# Patient Record
Sex: FEMALE | Race: WHITE | NOT HISPANIC OR LATINO | Employment: UNEMPLOYED | URBAN - METROPOLITAN AREA
[De-identification: names, ages, dates, MRNs, and addresses within clinical notes are randomized per-mention and may not be internally consistent; named-entity substitution may affect disease eponyms.]

---

## 2017-07-31 ENCOUNTER — NON-PROVIDER VISIT (OUTPATIENT)
Dept: OCCUPATIONAL MEDICINE | Facility: CLINIC | Age: 47
End: 2017-07-31

## 2017-07-31 DIAGNOSIS — Z02.1 DRUG TESTING, PRE-EMPLOYMENT: ICD-10-CM

## 2017-07-31 DIAGNOSIS — Z02.1 PRE-EMPLOYMENT DRUG SCREENING: ICD-10-CM

## 2017-07-31 LAB
AMP AMPHETAMINE: NORMAL
COC COCAINE: NORMAL
INT CON NEG: NORMAL
INT CON POS: NORMAL
MET METHAMPHETAMINES: NORMAL
OPI OPIATES: NORMAL
PCP PHENCYCLIDINE: NORMAL
POC DRUG COMMENT 753798-OCCUPATIONAL HEALTH: NORMAL
THC: NORMAL

## 2017-07-31 PROCEDURE — 80305 DRUG TEST PRSMV DIR OPT OBS: CPT | Performed by: PREVENTIVE MEDICINE

## 2017-08-29 ENCOUNTER — HOSPITAL ENCOUNTER (EMERGENCY)
Facility: MEDICAL CENTER | Age: 47
End: 2017-08-30
Attending: EMERGENCY MEDICINE
Payer: MEDICAID

## 2017-08-29 DIAGNOSIS — T50.904A OVERDOSE, UNDETERMINED INTENT, INITIAL ENCOUNTER: ICD-10-CM

## 2017-08-29 DIAGNOSIS — F31.9 BIPOLAR 1 DISORDER (HCC): ICD-10-CM

## 2017-08-29 DIAGNOSIS — R44.3 HALLUCINATIONS: ICD-10-CM

## 2017-08-29 LAB
ALBUMIN SERPL BCP-MCNC: 3.8 G/DL (ref 3.2–4.9)
ALBUMIN/GLOB SERPL: 1.3 G/DL
ALP SERPL-CCNC: 51 U/L (ref 30–99)
ALT SERPL-CCNC: 17 U/L (ref 2–50)
AMPHET UR QL SCN: NEGATIVE
ANION GAP SERPL CALC-SCNC: 10 MMOL/L (ref 0–11.9)
APAP SERPL-MCNC: <10 UG/ML (ref 10–30)
APPEARANCE UR: ABNORMAL
AST SERPL-CCNC: 17 U/L (ref 12–45)
BACTERIA #/AREA URNS HPF: ABNORMAL /HPF
BARBITURATES UR QL SCN: NEGATIVE
BASOPHILS # BLD AUTO: 0.4 % (ref 0–1.8)
BASOPHILS # BLD: 0.04 K/UL (ref 0–0.12)
BENZODIAZ UR QL SCN: NEGATIVE
BILIRUB SERPL-MCNC: 0.5 MG/DL (ref 0.1–1.5)
BILIRUB UR QL STRIP.AUTO: ABNORMAL
BUN SERPL-MCNC: 14 MG/DL (ref 8–22)
BZE UR QL SCN: NEGATIVE
CALCIUM SERPL-MCNC: 9.1 MG/DL (ref 8.5–10.5)
CANNABINOIDS UR QL SCN: POSITIVE
CHLORIDE SERPL-SCNC: 108 MMOL/L (ref 96–112)
CO2 SERPL-SCNC: 22 MMOL/L (ref 20–33)
COLOR UR: YELLOW
CREAT SERPL-MCNC: 0.84 MG/DL (ref 0.5–1.4)
CULTURE IF INDICATED INDCX: YES UA CULTURE
EOSINOPHIL # BLD AUTO: 0.23 K/UL (ref 0–0.51)
EOSINOPHIL NFR BLD: 2.5 % (ref 0–6.9)
EPI CELLS #/AREA URNS HPF: ABNORMAL /HPF
ERYTHROCYTE [DISTWIDTH] IN BLOOD BY AUTOMATED COUNT: 44.9 FL (ref 35.9–50)
ETHANOL BLD-MCNC: 0.01 G/DL
GFR SERPL CREATININE-BSD FRML MDRD: >60 ML/MIN/1.73 M 2
GLOBULIN SER CALC-MCNC: 2.9 G/DL (ref 1.9–3.5)
GLUCOSE SERPL-MCNC: 180 MG/DL (ref 65–99)
GLUCOSE UR STRIP.AUTO-MCNC: NEGATIVE MG/DL
GRAN CASTS #/AREA URNS LPF: ABNORMAL /LPF
HCT VFR BLD AUTO: 39.8 % (ref 37–47)
HGB BLD-MCNC: 13.8 G/DL (ref 12–16)
HYALINE CASTS #/AREA URNS LPF: ABNORMAL /LPF
IMM GRANULOCYTES # BLD AUTO: 0.04 K/UL (ref 0–0.11)
IMM GRANULOCYTES NFR BLD AUTO: 0.4 % (ref 0–0.9)
KETONES UR STRIP.AUTO-MCNC: ABNORMAL MG/DL
LEUKOCYTE ESTERASE UR QL STRIP.AUTO: ABNORMAL
LYMPHOCYTES # BLD AUTO: 1.56 K/UL (ref 1–4.8)
LYMPHOCYTES NFR BLD: 17 % (ref 22–41)
MCH RBC QN AUTO: 32 PG (ref 27–33)
MCHC RBC AUTO-ENTMCNC: 34.7 G/DL (ref 33.6–35)
MCV RBC AUTO: 92.3 FL (ref 81.4–97.8)
METHADONE UR QL SCN: POSITIVE
MICRO URNS: ABNORMAL
MONOCYTES # BLD AUTO: 0.54 K/UL (ref 0–0.85)
MONOCYTES NFR BLD AUTO: 5.9 % (ref 0–13.4)
NEUTROPHILS # BLD AUTO: 6.77 K/UL (ref 2–7.15)
NEUTROPHILS NFR BLD: 73.8 % (ref 44–72)
NITRITE UR QL STRIP.AUTO: NEGATIVE
NRBC # BLD AUTO: 0 K/UL
NRBC BLD AUTO-RTO: 0 /100 WBC
OPIATES UR QL SCN: NEGATIVE
OXYCODONE UR QL SCN: NEGATIVE
PCP UR QL SCN: NEGATIVE
PH UR STRIP.AUTO: 6 [PH]
PLATELET # BLD AUTO: 221 K/UL (ref 164–446)
PMV BLD AUTO: 10 FL (ref 9–12.9)
POTASSIUM SERPL-SCNC: 3.3 MMOL/L (ref 3.6–5.5)
PROPOXYPH UR QL SCN: NEGATIVE
PROT SERPL-MCNC: 6.7 G/DL (ref 6–8.2)
PROT UR QL STRIP: 30 MG/DL
RBC # BLD AUTO: 4.31 M/UL (ref 4.2–5.4)
RBC UR QL AUTO: NEGATIVE
SALICYLATES SERPL-MCNC: 0 MG/DL (ref 15–25)
SODIUM SERPL-SCNC: 140 MMOL/L (ref 135–145)
SP GR UR STRIP.AUTO: 1.03
TSH SERPL DL<=0.005 MIU/L-ACNC: 3.61 UIU/ML (ref 0.3–3.7)
UROBILINOGEN UR STRIP.AUTO-MCNC: 2 MG/DL
WBC # BLD AUTO: 9.2 K/UL (ref 4.8–10.8)
WBC #/AREA URNS HPF: ABNORMAL /HPF

## 2017-08-29 PROCEDURE — 85025 COMPLETE CBC W/AUTO DIFF WBC: CPT

## 2017-08-29 PROCEDURE — 80053 COMPREHEN METABOLIC PANEL: CPT

## 2017-08-29 PROCEDURE — 81001 URINALYSIS AUTO W/SCOPE: CPT | Mod: 59

## 2017-08-29 PROCEDURE — 87086 URINE CULTURE/COLONY COUNT: CPT

## 2017-08-29 PROCEDURE — 36415 COLL VENOUS BLD VENIPUNCTURE: CPT

## 2017-08-29 PROCEDURE — 700105 HCHG RX REV CODE 258: Performed by: EMERGENCY MEDICINE

## 2017-08-29 PROCEDURE — 84443 ASSAY THYROID STIM HORMONE: CPT

## 2017-08-29 PROCEDURE — 99285 EMERGENCY DEPT VISIT HI MDM: CPT

## 2017-08-29 PROCEDURE — 96360 HYDRATION IV INFUSION INIT: CPT

## 2017-08-29 PROCEDURE — 80307 DRUG TEST PRSMV CHEM ANLYZR: CPT

## 2017-08-29 PROCEDURE — 93005 ELECTROCARDIOGRAM TRACING: CPT | Performed by: EMERGENCY MEDICINE

## 2017-08-29 RX ORDER — HYDROXYZINE HYDROCHLORIDE 25 MG/1
25 TABLET, FILM COATED ORAL 3 TIMES DAILY PRN
COMMUNITY
End: 2018-06-26

## 2017-08-29 RX ORDER — SODIUM CHLORIDE, SODIUM LACTATE, POTASSIUM CHLORIDE, CALCIUM CHLORIDE 600; 310; 30; 20 MG/100ML; MG/100ML; MG/100ML; MG/100ML
1000 INJECTION, SOLUTION INTRAVENOUS ONCE
Status: COMPLETED | OUTPATIENT
Start: 2017-08-29 | End: 2017-08-29

## 2017-08-29 RX ORDER — GABAPENTIN 300 MG/1
300 CAPSULE ORAL 3 TIMES DAILY
COMMUNITY
End: 2018-06-26

## 2017-08-29 RX ORDER — RISPERIDONE 1 MG/1
1 TABLET ORAL 2 TIMES DAILY
COMMUNITY
End: 2018-06-26

## 2017-08-29 RX ADMIN — SODIUM CHLORIDE, POTASSIUM CHLORIDE, SODIUM LACTATE AND CALCIUM CHLORIDE 1000 ML: 600; 310; 30; 20 INJECTION, SOLUTION INTRAVENOUS at 20:28

## 2017-08-29 ASSESSMENT — PAIN SCALES - GENERAL: PAINLEVEL_OUTOF10: 0

## 2017-08-30 VITALS
HEART RATE: 89 BPM | TEMPERATURE: 99.2 F | WEIGHT: 185 LBS | OXYGEN SATURATION: 92 % | BODY MASS INDEX: 30.82 KG/M2 | HEIGHT: 65 IN | RESPIRATION RATE: 16 BRPM

## 2017-08-30 NOTE — DISCHARGE PLANNING
Referred patient to [facility] for follow up services.    Suzanne Merritt, Ph.D.

## 2017-08-30 NOTE — DISCHARGE INSTRUCTIONS
Bipolar Disorder  Bipolar disorder is a mental illness. The term bipolar disorder actually is used to describe a group of disorders that all share varying degrees of emotional highs and lows that can interfere with daily functioning, such as work, school, or relationships. Bipolar disorder also can lead to drug abuse, hospitalization, and suicide.  The emotional highs of bipolar disorder are periods of elation or irritability and high energy. These highs can range from a mild form (hypomania) to a severe form (gwen). People experiencing episodes of hypomania may appear energetic, excitable, and highly productive. People experiencing gwen may behave impulsively or erratically. They often make poor decisions. They may have difficulty sleeping. The most severe episodes of gwen can involve having very distorted beliefs or perceptions about the world and seeing or hearing things that are not real (psychotic delusions and hallucinations).   The emotional lows of bipolar disorder (depression) also can range from mild to severe. Severe episodes of bipolar depression can involve psychotic delusions and hallucinations.  Sometimes people with bipolar disorder experience a state of mixed mood. Symptoms of hypomania or gwen and depression are both present during this mixed-mood episode.  SIGNS AND SYMPTOMS  There are signs and symptoms of the episodes of hypomania and gwen as well as the episodes of depression. The signs and symptoms of hypomania and gwen are similar but vary in severity. They include:  · Inflated self-esteem or feeling of increased self-confidence.  · Decreased need for sleep.  · Unusual talkativeness (rapid or pressured speech) or the feeling of a need to keep talking.  · Sensation of racing thoughts or constant talking, with quick shifts between topics that may or may not be related (flight of ideas).  · Decreased ability to focus or concentrate.  · Increased purposeful activity, such as work, studies,  or social activity, or nonproductive activity, such as pacing, squirming and fidgeting, or finger and toe tapping.  · Impulsive behavior and use of poor judgment, resulting in high-risk activities, such as having unprotected sex or spending excessive amounts of money.  Signs and symptoms of depression include the following:   · Feelings of sadness, hopelessness, or helplessness.  · Frequent or uncontrollable episodes of crying.  · Lack of feeling anything or caring about anything.  · Difficulty sleeping or sleeping too much.   · Inability to enjoy the things you used to enjoy.    · Desire to be alone all the time.    · Feelings of guilt or worthlessness.   · Lack of energy or motivation.    · Difficulty concentrating, remembering, or making decisions.   · Change in appetite or weight beyond normal fluctuations.  · Thoughts of death or the desire to harm yourself.  DIAGNOSIS   Bipolar disorder is diagnosed through an assessment by your caregiver. Your caregiver will ask questions about your emotional episodes. There are two main types of bipolar disorder. People with type I bipolar disorder have manic episodes with or without depressive episodes. People with type II bipolar disorder have hypomanic episodes and major depressive episodes, which are more serious than mild depression. The type of bipolar disorder you have can make an important difference in how your illness is monitored and treated.  Your caregiver may ask questions about your medical history and use of alcohol or drugs, including prescription medication. Certain medical conditions and substances also can cause emotional highs and lows that resemble bipolar disorder (secondary bipolar disorder).   TREATMENT   Bipolar disorder is a long-term illness. It is best controlled with continuous treatment rather than treatment only when symptoms occur. The following treatments can be prescribed for bipolar disorders:  · Medication--Medication can be prescribed by  a doctor that is an expert in treating mental disorders (psychiatrists). Medications called mood stabilizers are usually prescribed to help control the illness. Other medications are sometimes added if symptoms of gwen, depression, or psychotic delusions and hallucinations occur despite the use of a mood stabilizer.  · Talk therapy--Some forms of talk therapy are helpful in providing support, education, and guidance.  A combination of medication and talk therapy is best for managing the disorder over time. A procedure in which electricity is applied to your brain through your scalp (electroconvulsive therapy) is used in cases of severe gwen when medication and talk therapy do not work or work too slowly.     This information is not intended to replace advice given to you by your health care provider. Make sure you discuss any questions you have with your health care provider.     Document Released: 03/26/2002 Document Revised: 01/08/2016 Document Reviewed: 01/13/2014  [employer] Interactive Patient Education ©2016 [employer]    Hallucinations and Delusions  You seem to be having hallucinations and/or delusions. You may be hearing voices that no one else can hear. This can seem very real to you. You may be having thoughts and fears that do not make sense to others. This condition can be due to mental disease like schizophrenia. It may be caused by a medical condition, such as an infection or electrolyte disturbance. These symptoms are also seen in drug abusers, especially those who use crack cocaine and amphetamines. Drugs like PCP, LSD, MDMA, peyote, and psilocybin can also cause frightening hallucinations and loss of control.  If your symptoms are due to drug abuse, your mental state should improve as the drug(s) leave your system. Someone you trust should be with you until you are better to protect you and calm your fears. Often tranquilizers are very helpful at controlling hallucinations, anxiety, and  destructive behavior. Getting a proper diet and enough sleep is important to recovery. If your symptoms are not due to drugs, or do not improve over several days after stopping drug use, you need further medical or mental health care.  SEEK IMMEDIATE MEDICAL CARE IF:   · Your symptoms get worse, especially if you think your life is in danger  · You have violent or destructive thoughts.  Recovery is possible, but you have to get proper treatment and avoid drugs that are known to cause you trouble.  Document Released: 01/25/2006 Document Revised: 03/11/2013 Document Reviewed: 12/18/2006  ExitCare® Patient Information ©2014 [employer].    Drug Overdose  Drug overdose happens when you take too much of a drug. An overdose can occur with illegal drugs, prescription drugs, or over-the-counter (OTC) drugs.  The effects of drug overdose can be mild, dangerous, or even deadly.  CAUSES  Drug overdose may be caused by:  · Taking too much of a drug on purpose.  · Taking too much of a drug by accident.  · An error made by a health care provider who prescribes a drug.  · An error made by a pharmacist who fills the prescription order.  Drugs that commonly cause overdose include:  · Mental health drugs.  · Pain medicines.  · Illegal drugs.  · OTC cough and cold medicines.  · Heart medicines.  · Seizure medicines.  RISK FACTORS  Drug overdose is more likely in:  · Children. They may be attracted to colorful pills. Because of children's small size, even a small amount of a drug can be dangerous.  · Elderly people. They may be taking many different drugs. Elderly people may have difficulty reading labels or remembering when they last took their medicine.  The risk of drug overdose is also higher for someone who:  · Takes illegal drugs.  · Takes a drug and drinks alcohol.  · Has a mental health condition.  SYMPTOMS  Signs and symptoms of drug overdose depend on the drug and the amount that was taken. Common danger signs  include:  · Behavior changes.  · Sleepiness.  · Slowed breathing.  · Nausea and vomiting.  · Seizures.  · Changes in eye pupil size (very large or very small).  If there are signs of very low blood pressure from a drug overdose (shock), emergency treatment is required. These signs include:  · Cold and clammy skin.  · Pale skin.  · Blue lips.  · Very slow breathing.  · Extreme sleepiness.  · Loss of consciousness.  DIAGNOSIS  Drug overdose may be diagnosed based on your symptoms. It is important that you tell your health care provider:  · All of the drugs that you have taken.  · When you took the drugs.  · Whether you were drinking alcohol.  Your health care provider will do a physical exam. This exam may include:  · Checking and monitoring your heart rate and rhythm, your temperature, and your blood pressure (vital signs).  · Checking your breathing and oxygen level.  You may also have tests, including:   · Urine tests to check for drugs in your system.  · Blood tests to check for:  ¨ Drugs in your system.  ¨ Signs of an imbalance of your blood minerals (electrolytes).  ¨ Liver damage.  ¨ Kidney damage.  TREATMENT  Supporting your vital signs and your breathing is the first step in treating a drug overdose. Treatment may also include:  · Receiving fluids and electrolytes through an IV tube.  · Having a breathing tube (endotracheal tube) inserted in your airway to help you breathe.  · Having a tube passed through your nose and into your stomach (nasogastric tube) to wash out your stomach.  · Medicines. You may get medicines to:  ¨ Make you vomit.  ¨ Absorb any medicine that is left in your digestive system (activated charcoal).  ¨ Block or reverse the effect of the drug that caused the overdose.  · Having your blood filtered through an artificial kidney machine (hemodialysis). You may need this if your overdose is severe or if you have kidney failure.  · Having ongoing counseling and mental health support if you  intentionally overdosed or used an illegal drug.  HOME CARE INSTRUCTIONS  · Take medicines only as directed by your health care provider. Always ask your health care provider to discuss the possible side effects of any new drug that you start taking.  · Keep a list of all of the drugs that you take, including over-the-counter medicines. Bring this list with you to all of your medical visits.  · Read the drug inserts that come with your medicines.  · Do not use illegal drugs.  · Do not drink alcohol when taking drugs.  · Store all medicines in safety containers that are out of the reach of children.  · Keep the phone number of your local poison control center near your phone or on your cell phone.  · Get help if you are struggling with alcohol or drug use.  · Get help if you are struggling with depression or another mental health problem.  · Keep all follow-up visits as directed by your health care provider. This is important.  SEEK MEDICAL CARE IF:  · Your symptoms return.  · You develop any new signs or symptoms when you are taking medicines.  SEEK IMMEDIATE MEDICAL CARE IF:  · You think that you or someone else may have taken too much of a drug. The hotline of the National Poison Control Center is (059) 331-1923.  · You or someone else is having symptoms of a drug overdose.  · You have serious thoughts about hurting yourself or others.  · You have chest pain.  · You have difficulty breathing.  · You have a loss of consciousness.  Drug overdose is an emergency. Do not wait to see if the symptoms will go away. Get medical help right away. Call your local emergency services (911 in the [place] Do not drive yourself to the hospital.     This information is not intended to replace advice given to you by your health care provider. Make sure you discuss any questions you have with your health care provider.     Document Released: 05/03/2016 Document Reviewed: 12/23/2015  Elsevier Interactive Patient Education ©2016  [employer]

## 2017-08-30 NOTE — ED NOTES
Kaylee Ivy  47 y.o.  Chief Complaint   Patient presents with   • Hallucinations     auditory/visual     BIB EMS for above complaint. Patient currently A & O x 4, but with inappropriate conversation at time. States that she is seeing shapes in the corners of the room and that when she closes her eyes she hears people talking to her that aren't there. States that she has taken extra doses of both her hydroxyzine and metformin today. Hx. DM and bipolar.

## 2017-08-30 NOTE — ED NOTES
Patient discharged in stable condition with mother. Verbalized understanding of all discharge instructions. All belongings accounted for.

## 2017-08-30 NOTE — DISCHARGE PLANNING
Medical Social Work    Referral: Family Contact    Intervention: MSW received report from bedside RN that per ERP pt is in need of someone to pick her up or a safe way home.  Pt provided RN with her mom's number: Nora ([phone]).  MSW contacted pt's mom and informed her of pt's D/C.  Pt's mom states that she will come pick pt up.  MSW updated bedside RN and ER Lobby.    Plan: Pt to D/C home once family arrives.

## 2017-08-30 NOTE — ED PROVIDER NOTES
ED Provider Note    Scribed for Fred Jason D.O. by Isaebl Gay. 8/29/2017  7:52 PM    Primary care provider: Pcp Pt States None   History obtained from: Patient, nurse   History limited by: None     CHIEF COMPLAINT  Chief Complaint   Patient presents with   • Hallucinations     auditory/visual        HPI    Kaylee Ivy is a 47 y.o. female with a history of bipolar disorder who presents to the ED for visual and auditory hallucinations. Per report, the patient is seeing shapes in the corner of the room and hearing people talking who are not there. The patient reports that she took extra Risperdal today, 5 pills. Per patient, she was not trying to hurt herself, she just wanted to experience how they made her feel. She denies taking any other extra medications. The patient denies previous overdose in the past. Per patient, she began having difficulty speaking around 6:00 pm today. She states that she feels warm. Patient denies chills, nausea, vomiting, diarrhea, abdominal pain, dysuria, suicidal ideations, homicidal ideations. The patient correctly identifies the month, year and president. She originally thought she was at [place], but did know she was in the hospital.     REVIEW OF SYSTEMS  Please see HPI for pertinent positives/negatives.  All other systems reviewed and are negative. C    PAST MEDICAL HISTORY  Past Medical History:   Diagnosis Date   • Bipolar 1 disorder (CMS-[facility])    • Diabetes (CMS-[facility])    • History of diabetic neuropathy         SURGICAL HISTORY  No past surgical history.     SOCIAL HISTORY  Social History     Social History Main Topics   • Smoking status: Never Smoker   • Smokeless tobacco: Never Used   • Alcohol use No   • Drug use: No        FAMILY HISTORY  History reviewed. No pertinent family history.     CURRENT MEDICATIONS  Home Medications     Reviewed by Carey Thibodeaux R.N. (Registered Nurse) on 08/29/17 at 1942  Med List Status: Complete   Medication Last Dose Status  "  gabapentin (NEURONTIN) 300 MG Cap  Active   hydrOXYzine (ATARAX) 25 MG Tab  Active   insulin aspart (NOVOLOG) 100 UNIT/ML Solution  Active   metformin (GLUCOPHAGE) 500 MG Tab  Active   risperidone (RISPERDAL) 1 MG Tab  Active                 ALLERGIES  No Known Allergies     PHYSICAL EXAM  VITAL SIGNS: Pulse (!) 135   Temp 37.3 °C (99.2 °F)   Resp 20   Ht 1.651 m (5' 5\")   Wt 83.9 kg (185 lb)   SpO2 92%   BMI 30.79 kg/m²  @CLIFFORD[[id number]::@     Pulse ox interpretation: 92% I interpret this pulse ox as normal     Constitutional: Well developed, well nourished, alert in no apparent distress, nontoxic appearance    HENT: No external signs of trauma, normocephalic, bilateral external ears normal, oropharynx moist and clear, nose normal    Eyes: PERRL, EOMI, conjunctiva without erythema, no discharge, no icterus    Neck: Soft and supple, trachea midline, no stridor, no tenderness, no LAD, no JVD, good ROM    Cardiovascular: Tachycardia, no murmurs/rubs/gallops, strong distal pulses and good perfusion Thorax & Lungs: No respiratory distress, CTAB, no chest tenderness    Abdomen: Soft, nontender, nondistended, no guarding, no rebound, normal BS    Back: No CVAT    Extremities: No clubbing, no cyanosis, no edema, no gross deformity, good ROM, no tenderness, intact distal pulses with brisk cap refill    Skin: Warm, dry, no pallor/cyanosis, no rash noted    Lymphatic: No lymphadenopathy noted    Neuro: A/O times 3, no focal deficits noted   Neuro: Alert and oriented to person, place, and time.  GCS 15.  CN II-XII grossly intact.  Normal speech.  No pronator drift.  Equal strength bilateral UE/LE.  Sensation intact to touch.  No cerebellar signs.  Psychiatric: Cooperative, denies suicidal or homicidal ideation, impaired judgment        DIAGNOSTIC STUDIES / PROCEDURES    EKG  12 Lead EKG obtained at 2030 and interpreted by me:   Rate: 121   Rhythm: Sinus tach  Ectopy: None  Intervals: Normal   Axis: Normal   Q Waves: " Inferior leads  QRS: Normal   ST segments: Normal   T Waves: Normal     Clinical Impression:Sinus tach without acute ST-T wave changes      LABS  All labs reviewed by me.     Results for orders placed or performed during the hospital encounter of 08/29/17   CBC WITH DIFFERENTIAL   Result Value Ref Range    WBC 9.2 4.8 - 10.8 K/uL    RBC 4.31 4.20 - 5.40 M/uL    Hemoglobin 13.8 12.0 - 16.0 g/dL    Hematocrit 39.8 37.0 - 47.0 %    MCV 92.3 81.4 - 97.8 fL    MCH 32.0 27.0 - 33.0 pg    MCHC 34.7 33.6 - 35.0 g/dL    RDW 44.9 35.9 - 50.0 fL    Platelet Count 221 164 - 446 K/uL    MPV 10.0 9.0 - 12.9 fL    Neutrophils-Polys 73.80 (H) 44.00 - 72.00 %    Lymphocytes 17.00 (L) 22.00 - 41.00 %    Monocytes 5.90 0.00 - 13.40 %    Eosinophils 2.50 0.00 - 6.90 %    Basophils 0.40 0.00 - 1.80 %    Immature Granulocytes 0.40 0.00 - 0.90 %    Nucleated RBC 0.00 /100 WBC    Neutrophils (Absolute) 6.77 2.00 - 7.15 K/uL    Lymphs (Absolute) 1.56 1.00 - 4.80 K/uL    Monos (Absolute) 0.54 0.00 - 0.85 K/uL    Eos (Absolute) 0.23 0.00 - 0.51 K/uL    Baso (Absolute) 0.04 0.00 - 0.12 K/uL    Immature Granulocytes (abs) 0.04 0.00 - 0.11 K/uL    NRBC (Absolute) 0.00 K/uL   COMP METABOLIC PANEL   Result Value Ref Range    Sodium 140 135 - 145 mmol/L    Potassium 3.3 (L) 3.6 - 5.5 mmol/L    Chloride 108 96 - 112 mmol/L    Co2 22 20 - 33 mmol/L    Anion Gap 10.0 0.0 - 11.9    Glucose 180 (H) 65 - 99 mg/dL    Bun 14 8 - 22 mg/dL    Creatinine 0.84 0.50 - 1.40 mg/dL    Calcium 9.1 8.5 - 10.5 mg/dL    AST(SGOT) 17 12 - 45 U/L    ALT(SGPT) 17 2 - 50 U/L    Alkaline Phosphatase 51 30 - 99 U/L    Total Bilirubin 0.5 0.1 - 1.5 mg/dL    Albumin 3.8 3.2 - 4.9 g/dL    Total Protein 6.7 6.0 - 8.2 g/dL    Globulin 2.9 1.9 - 3.5 g/dL    A-G Ratio 1.3 g/dL   DIAGNOSTIC ALCOHOL   Result Value Ref Range    Diagnostic Alcohol 0.01 (H) 0.00 g/dL   URINE DRUG SCREEN   Result Value Ref Range    Amphetamines Urine Negative Negative    Barbiturates Negative Negative     Benzodiazepines Negative Negative    Cocaine Metabolite Negative Negative    Methadone Positive (A) Negative    Opiates Negative Negative    Oxycodone Negative Negative    Phencyclidine -Pcp Negative Negative    Propoxyphene Negative Negative    Cannabinoid Metab Positive (A) Negative   URINALYSIS CULTURE, IF INDICATED   Result Value Ref Range    Micro Urine Req Microscopic     Color Yellow     Character Hazy (A)     Specific Gravity 1.030 <1.035    Ph 6.0 5.0 - 8.0    Glucose Negative Negative mg/dL    Ketones Trace (A) Negative mg/dL    Protein 30 (A) Negative mg/dL    Bilirubin Small (A) Negative    Urobilinogen, Urine 2.0 Negative    Nitrite Negative Negative    Leukocyte Esterase Trace (A) Negative    Occult Blood Negative Negative    Culture Indicated Yes UA Culture   ACETAMINOPHEN   Result Value Ref Range    Acetaminophen -Tylenol <10 10 - 30 ug/mL   SALICYLATE   Result Value Ref Range    Salicylates, Quant. 0 (L) 15 - 25 mg/dL   TSH   Result Value Ref Range    TSH 3.610 0.300 - 3.700 uIU/mL   ESTIMATED GFR   Result Value Ref Range    GFR If African American >60 >60 mL/min/1.73 m 2    GFR If Non African American >60 >60 mL/min/1.73 m 2   URINE MICROSCOPIC (W/UA)   Result Value Ref Range    WBC 10-20 (A) /hpf    Bacteria Many (A) None /hpf    Epithelial Cells Moderate (A) /hpf    Hyaline Cast 3-5 (A) /lpf    Granular Casts 0-2 /lpf   EKG (NOW)   Result Value Ref Range    Report       [facility] Emergency Dept.    Test Date:  2017  Pt Name:    OKSANA WILSON                  Department: ER  MRN:        [id number]                      Room:        01  Gender:     F                            Technician: [id number]  :        [id number]                   Requested By:JESSI DUPREE  Order #:    [id number]                    Reading MD:    Measurements  Intervals                                Axis  Rate:       121                          P:          70  NC:         168                           QRS:        43  QRSD:       86                           T:          -51  QT:         268  QTc:        381    Interpretive Statements  SINUS TACHYCARDIA  PROBABLE INFERIOR INFARCT, AGE INDETERMINATE  No previous ECG available for comparison        COURSE & MEDICAL DECISION MAKING  Nursing notes, VS, PMSFHx reviewed in chart.     Differential diagnoses considered include but are not limited to: Suicidal ideation/attempt, anxiety, depression, psychosis/schizophrenia, personality disorder, intoxication/overdose, CVA/TIA, meningitis/encephalitis, sepsis, hypoxia, electrolyte abnormality, thyroid disorder, endocrine dysfunction       7:54 PM Patient seen and evaluated at bedside. Labs and EKG were ordered. Patient was treated with lactated ringers infusion.     12:53 AM Patient reevaluated at bedside. Her mother is here to take her home. Discussed plan for discharge; I advised the patient to return to the [facility] ED with any new or worsening symptoms. Patient was given the opportunity for questions. I addressed all questions or concerns at this time and they verbalize agreement to the plan of care.     Patient presents to the ED with above complaint. EKG shows sinus tach with normal intervals and without any ischemic changes. Labs were essentially unremarkable except for mild hyperglycemia and positive urine drug screen. Her UA was not a good specimen limiting its utility. Patient was monitored in the ED for more than 5 hours and has remained stable. She was also evaluated by life skills who does not feel patient needs inpatient treatment. Patient continues to deny any suicidal or homicidal ideation. Patient will be discharged home with her mother. Findings discussed with the patient and her mother. I discussed with them importance of outpatient follow-up and gave them return to ED precautions. They verbalized understanding and agreed with plan of care with no further questions or concerns.    The patient is referred to a  primary physician for blood pressure management, diabetic screening, and for all other preventative health concerns.       FINAL IMPRESSION  1. Hallucinations    2. Bipolar 1 disorder (CMS-H[facility])    3. Overdose, undetermined intent, initial encounter         DISPOSITION  Patient will be discharged home in stable condition.       FOLLOW UP  Please follow up with your doctor    Call today      [facility], Emergency Dept  [address] [facility] [id number] [phone]    If symptoms worsen         Isabel BENTLEY (Scribe), am scribing for, and in the presence of, Fred Jason D.O..    Electronically signed by: Isabel Gay (Scribe), 8/29/2017    IFred D.O. personally performed the services described in this documentation, as scribed by Isabel Gay in my presence, and it is both accurate and complete.    Portions of this record were made with voice recognition software and by scribes.  Despite my review, spelling/grammar/context errors may still remain.  Interpretation of this chart should be taken in this context.    The note accurately reflects work and decisions made by me.  Fred Jason  8/30/2017  4:15 AM

## 2017-09-01 LAB
BACTERIA UR CULT: NORMAL
SIGNIFICANT IND 70042: NORMAL
SITE SITE: NORMAL
SOURCE SOURCE: NORMAL

## 2017-11-10 LAB — EKG IMPRESSION: NORMAL

## 2018-06-26 ENCOUNTER — HOSPITAL ENCOUNTER (INPATIENT)
Facility: MEDICAL CENTER | Age: 48
LOS: 14 days | DRG: 854 | End: 2018-07-10
Attending: EMERGENCY MEDICINE | Admitting: INTERNAL MEDICINE
Payer: MEDICAID

## 2018-06-26 ENCOUNTER — APPOINTMENT (OUTPATIENT)
Dept: RADIOLOGY | Facility: MEDICAL CENTER | Age: 48
DRG: 854 | End: 2018-06-26
Attending: INTERNAL MEDICINE
Payer: MEDICAID

## 2018-06-26 ENCOUNTER — APPOINTMENT (OUTPATIENT)
Dept: RADIOLOGY | Facility: MEDICAL CENTER | Age: 48
DRG: 854 | End: 2018-06-26
Attending: EMERGENCY MEDICINE
Payer: MEDICAID

## 2018-06-26 DIAGNOSIS — E11.628 DIABETIC FOOT INFECTION (HCC): ICD-10-CM

## 2018-06-26 DIAGNOSIS — M86.672 OTHER CHRONIC OSTEOMYELITIS OF LEFT FOOT (HCC): ICD-10-CM

## 2018-06-26 DIAGNOSIS — L08.9 DIABETIC FOOT INFECTION (HCC): ICD-10-CM

## 2018-06-26 DIAGNOSIS — E11.8 DIABETIC FOOT (HCC): ICD-10-CM

## 2018-06-26 DIAGNOSIS — M86.172 OTHER ACUTE OSTEOMYELITIS OF LEFT FOOT (HCC): ICD-10-CM

## 2018-06-26 DIAGNOSIS — A41.9 SEPSIS, DUE TO UNSPECIFIED ORGANISM: ICD-10-CM

## 2018-06-26 PROBLEM — F99 PSYCHIATRIC SYMPTOMS: Status: ACTIVE | Noted: 2018-06-26
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
## 2018-06-26 PROBLEM — E66.01 MORBID OBESITY (HCC): Status: ACTIVE | Noted: 2018-06-26

## 2018-06-26 PROBLEM — F31.9 BIPOLAR 1 DISORDER (HCC): Status: ACTIVE | Noted: 2018-06-26

## 2018-06-26 PROBLEM — F11.20 METHADONE DEPENDENCE (HCC): Status: ACTIVE | Noted: 2018-06-26

## 2018-06-26 PROBLEM — E08.43 DIABETES MELLITUS DUE TO UNDERLYING CONDITION WITH DIABETIC AUTONOMIC NEUROPATHY, WITH LONG-TERM CURRENT USE OF INSULIN (HCC): Status: ACTIVE | Noted: 2018-06-26

## 2018-06-26 PROBLEM — Z79.4 DIABETES MELLITUS DUE TO UNDERLYING CONDITION WITH DIABETIC AUTONOMIC NEUROPATHY, WITH LONG-TERM CURRENT USE OF INSULIN (HCC): Status: ACTIVE | Noted: 2018-06-26

## 2018-06-26 LAB
ALBUMIN SERPL BCP-MCNC: 4.4 G/DL (ref 3.2–4.9)
ALBUMIN/GLOB SERPL: 1.3 G/DL
ALP SERPL-CCNC: 72 U/L (ref 30–99)
ALT SERPL-CCNC: 28 U/L (ref 2–50)
ANION GAP SERPL CALC-SCNC: 8 MMOL/L (ref 0–11.9)
AST SERPL-CCNC: 29 U/L (ref 12–45)
BASOPHILS # BLD AUTO: 0.4 % (ref 0–1.8)
BASOPHILS # BLD: 0.06 K/UL (ref 0–0.12)
BILIRUB SERPL-MCNC: 0.8 MG/DL (ref 0.1–1.5)
BUN SERPL-MCNC: 11 MG/DL (ref 8–22)
CALCIUM SERPL-MCNC: 8.6 MG/DL (ref 8.4–10.2)
CHLORIDE SERPL-SCNC: 102 MMOL/L (ref 96–112)
CO2 SERPL-SCNC: 21 MMOL/L (ref 20–33)
CREAT SERPL-MCNC: 0.81 MG/DL (ref 0.5–1.4)
EKG IMPRESSION: NORMAL
EOSINOPHIL # BLD AUTO: 0.15 K/UL (ref 0–0.51)
EOSINOPHIL NFR BLD: 1.1 % (ref 0–6.9)
ERYTHROCYTE [DISTWIDTH] IN BLOOD BY AUTOMATED COUNT: 47.5 FL (ref 35.9–50)
ERYTHROCYTE [SEDIMENTATION RATE] IN BLOOD BY WESTERGREN METHOD: 7 MM/HOUR (ref 0–30)
EST. AVERAGE GLUCOSE BLD GHB EST-MCNC: 174 MG/DL
GLOBULIN SER CALC-MCNC: 3.3 G/DL (ref 1.9–3.5)
GLUCOSE BLD-MCNC: 223 MG/DL (ref 65–99)
GLUCOSE SERPL-MCNC: 164 MG/DL (ref 65–99)
HBA1C MFR BLD: 7.7 % (ref 0–5.6)
HCT VFR BLD AUTO: 41.1 % (ref 37–47)
HGB BLD-MCNC: 14.1 G/DL (ref 12–16)
IMM GRANULOCYTES # BLD AUTO: 0.08 K/UL (ref 0–0.11)
IMM GRANULOCYTES NFR BLD AUTO: 0.6 % (ref 0–0.9)
LACTATE BLD-SCNC: 1.4 MMOL/L (ref 0.5–2)
LYMPHOCYTES # BLD AUTO: 2.8 K/UL (ref 1–4.8)
LYMPHOCYTES NFR BLD: 19.7 % (ref 22–41)
MCH RBC QN AUTO: 31.9 PG (ref 27–33)
MCHC RBC AUTO-ENTMCNC: 34.3 G/DL (ref 33.6–35)
MCV RBC AUTO: 93 FL (ref 81.4–97.8)
MONOCYTES # BLD AUTO: 0.82 K/UL (ref 0–0.85)
MONOCYTES NFR BLD AUTO: 5.8 % (ref 0–13.4)
NEUTROPHILS # BLD AUTO: 10.33 K/UL (ref 2–7.15)
NEUTROPHILS NFR BLD: 72.4 % (ref 44–72)
NRBC # BLD AUTO: 0 K/UL
NRBC BLD-RTO: 0 /100 WBC
PLATELET # BLD AUTO: 261 K/UL (ref 164–446)
PMV BLD AUTO: 9.1 FL (ref 9–12.9)
POTASSIUM SERPL-SCNC: 3.7 MMOL/L (ref 3.6–5.5)
PROT SERPL-MCNC: 7.7 G/DL (ref 6–8.2)
RBC # BLD AUTO: 4.42 M/UL (ref 4.2–5.4)
SODIUM SERPL-SCNC: 131 MMOL/L (ref 135–145)
WBC # BLD AUTO: 14.2 K/UL (ref 4.8–10.8)

## 2018-06-26 PROCEDURE — 700111 HCHG RX REV CODE 636 W/ 250 OVERRIDE (IP): Performed by: EMERGENCY MEDICINE

## 2018-06-26 PROCEDURE — 85025 COMPLETE CBC W/AUTO DIFF WBC: CPT

## 2018-06-26 PROCEDURE — 304561 HCHG STAT O2

## 2018-06-26 PROCEDURE — 36415 COLL VENOUS BLD VENIPUNCTURE: CPT

## 2018-06-26 PROCEDURE — 96365 THER/PROPH/DIAG IV INF INIT: CPT

## 2018-06-26 PROCEDURE — 87040 BLOOD CULTURE FOR BACTERIA: CPT

## 2018-06-26 PROCEDURE — 700105 HCHG RX REV CODE 258: Performed by: INTERNAL MEDICINE

## 2018-06-26 PROCEDURE — 83605 ASSAY OF LACTIC ACID: CPT

## 2018-06-26 PROCEDURE — 93005 ELECTROCARDIOGRAM TRACING: CPT | Performed by: EMERGENCY MEDICINE

## 2018-06-26 PROCEDURE — 700102 HCHG RX REV CODE 250 W/ 637 OVERRIDE(OP): Performed by: INTERNAL MEDICINE

## 2018-06-26 PROCEDURE — 83036 HEMOGLOBIN GLYCOSYLATED A1C: CPT

## 2018-06-26 PROCEDURE — 73630 X-RAY EXAM OF FOOT: CPT | Mod: LT

## 2018-06-26 PROCEDURE — 96367 TX/PROPH/DG ADDL SEQ IV INF: CPT

## 2018-06-26 PROCEDURE — 82962 GLUCOSE BLOOD TEST: CPT

## 2018-06-26 PROCEDURE — 700105 HCHG RX REV CODE 258: Performed by: EMERGENCY MEDICINE

## 2018-06-26 PROCEDURE — 99285 EMERGENCY DEPT VISIT HI MDM: CPT

## 2018-06-26 PROCEDURE — 87070 CULTURE OTHR SPECIMN AEROBIC: CPT

## 2018-06-26 PROCEDURE — 96375 TX/PRO/DX INJ NEW DRUG ADDON: CPT

## 2018-06-26 PROCEDURE — 85652 RBC SED RATE AUTOMATED: CPT

## 2018-06-26 PROCEDURE — 80053 COMPREHEN METABOLIC PANEL: CPT

## 2018-06-26 PROCEDURE — A9270 NON-COVERED ITEM OR SERVICE: HCPCS | Performed by: INTERNAL MEDICINE

## 2018-06-26 PROCEDURE — 87205 SMEAR GRAM STAIN: CPT

## 2018-06-26 PROCEDURE — 99222 1ST HOSP IP/OBS MODERATE 55: CPT | Performed by: INTERNAL MEDICINE

## 2018-06-26 PROCEDURE — 700102 HCHG RX REV CODE 250 W/ 637 OVERRIDE(OP): Performed by: EMERGENCY MEDICINE

## 2018-06-26 PROCEDURE — 770020 HCHG ROOM/CARE - TELE (206)

## 2018-06-26 PROCEDURE — 700111 HCHG RX REV CODE 636 W/ 250 OVERRIDE (IP): Performed by: INTERNAL MEDICINE

## 2018-06-26 PROCEDURE — 71045 X-RAY EXAM CHEST 1 VIEW: CPT

## 2018-06-26 PROCEDURE — 73718 MRI LOWER EXTREMITY W/O DYE: CPT | Mod: LT

## 2018-06-26 PROCEDURE — A9270 NON-COVERED ITEM OR SERVICE: HCPCS | Performed by: EMERGENCY MEDICINE

## 2018-06-26 PROCEDURE — 96366 THER/PROPH/DIAG IV INF ADDON: CPT

## 2018-06-26 RX ORDER — SULFAMETHOXAZOLE AND TRIMETHOPRIM 800; 160 MG/1; MG/1
1 TABLET ORAL 2 TIMES DAILY
Status: ON HOLD | COMMUNITY
Start: 2018-06-12 | End: 2018-07-09

## 2018-06-26 RX ORDER — TEMAZEPAM 15 MG/1
15 CAPSULE ORAL
Status: DISCONTINUED | OUTPATIENT
Start: 2018-06-26 | End: 2018-07-10 | Stop reason: HOSPADM

## 2018-06-26 RX ORDER — DEXTROSE MONOHYDRATE 25 G/50ML
25 INJECTION, SOLUTION INTRAVENOUS
Status: DISCONTINUED | OUTPATIENT
Start: 2018-06-26 | End: 2018-07-10 | Stop reason: HOSPADM

## 2018-06-26 RX ORDER — KETOROLAC TROMETHAMINE 30 MG/ML
30 INJECTION, SOLUTION INTRAMUSCULAR; INTRAVENOUS EVERY 6 HOURS
Status: DISPENSED | OUTPATIENT
Start: 2018-06-26 | End: 2018-06-29

## 2018-06-26 RX ORDER — SODIUM CHLORIDE 9 MG/ML
1000 INJECTION, SOLUTION INTRAVENOUS ONCE
Status: COMPLETED | OUTPATIENT
Start: 2018-06-26 | End: 2018-06-26

## 2018-06-26 RX ORDER — ACETAMINOPHEN 325 MG/1
650 TABLET ORAL EVERY 6 HOURS PRN
Status: DISCONTINUED | OUTPATIENT
Start: 2018-06-26 | End: 2018-06-28

## 2018-06-26 RX ORDER — M-VIT,TX,IRON,MINS/CALC/FOLIC 27MG-0.4MG
2 TABLET ORAL DAILY
Status: DISCONTINUED | OUTPATIENT
Start: 2018-06-26 | End: 2018-07-10 | Stop reason: HOSPADM

## 2018-06-26 RX ORDER — INSULIN GLARGINE 100 [IU]/ML
16 INJECTION, SOLUTION SUBCUTANEOUS NIGHTLY
Status: DISCONTINUED | OUTPATIENT
Start: 2018-06-26 | End: 2018-06-30

## 2018-06-26 RX ORDER — NICOTINE 21 MG/24HR
21 PATCH, TRANSDERMAL 24 HOURS TRANSDERMAL
Status: DISCONTINUED | OUTPATIENT
Start: 2018-06-26 | End: 2018-07-10 | Stop reason: HOSPADM

## 2018-06-26 RX ORDER — LISINOPRIL 10 MG/1
10 TABLET ORAL DAILY
Status: ON HOLD | COMMUNITY
End: 2018-07-09

## 2018-06-26 RX ORDER — AMOXICILLIN 250 MG
2 CAPSULE ORAL 2 TIMES DAILY
Status: DISCONTINUED | OUTPATIENT
Start: 2018-06-26 | End: 2018-07-10 | Stop reason: HOSPADM

## 2018-06-26 RX ORDER — SIMVASTATIN 20 MG
20 TABLET ORAL NIGHTLY
Status: DISCONTINUED | OUTPATIENT
Start: 2018-06-26 | End: 2018-07-10 | Stop reason: HOSPADM

## 2018-06-26 RX ORDER — ACETAMINOPHEN 500 MG
1000 TABLET ORAL EVERY 6 HOURS
Status: DISPENSED | OUTPATIENT
Start: 2018-06-26 | End: 2018-07-01

## 2018-06-26 RX ORDER — LORAZEPAM 2 MG/ML
0.5 INJECTION INTRAMUSCULAR EVERY 6 HOURS PRN
Status: DISCONTINUED | OUTPATIENT
Start: 2018-06-26 | End: 2018-07-01

## 2018-06-26 RX ORDER — SIMVASTATIN 20 MG
20 TABLET ORAL NIGHTLY
Status: ON HOLD | COMMUNITY
End: 2018-07-09

## 2018-06-26 RX ORDER — LORAZEPAM 1 MG/1
1 TABLET ORAL ONCE
Status: COMPLETED | OUTPATIENT
Start: 2018-06-26 | End: 2018-06-26

## 2018-06-26 RX ORDER — SODIUM CHLORIDE 9 MG/ML
30 INJECTION, SOLUTION INTRAVENOUS
Status: DISCONTINUED | OUTPATIENT
Start: 2018-06-26 | End: 2018-06-30

## 2018-06-26 RX ORDER — LISINOPRIL 5 MG/1
10 TABLET ORAL DAILY
Status: DISCONTINUED | OUTPATIENT
Start: 2018-06-26 | End: 2018-07-06

## 2018-06-26 RX ORDER — MORPHINE SULFATE 4 MG/ML
4 INJECTION, SOLUTION INTRAMUSCULAR; INTRAVENOUS
Status: DISCONTINUED | OUTPATIENT
Start: 2018-06-26 | End: 2018-06-30

## 2018-06-26 RX ORDER — LORAZEPAM 0.5 MG/1
0.5 TABLET ORAL EVERY 6 HOURS PRN
Status: DISCONTINUED | OUTPATIENT
Start: 2018-06-26 | End: 2018-07-01

## 2018-06-26 RX ORDER — POLYETHYLENE GLYCOL 3350 17 G/17G
1 POWDER, FOR SOLUTION ORAL
Status: DISCONTINUED | OUTPATIENT
Start: 2018-06-26 | End: 2018-07-10 | Stop reason: HOSPADM

## 2018-06-26 RX ORDER — SODIUM CHLORIDE 9 MG/ML
500 INJECTION, SOLUTION INTRAVENOUS
Status: DISCONTINUED | OUTPATIENT
Start: 2018-06-26 | End: 2018-06-30

## 2018-06-26 RX ORDER — OXYCODONE HYDROCHLORIDE 5 MG/1
5 TABLET ORAL
Status: DISCONTINUED | OUTPATIENT
Start: 2018-06-26 | End: 2018-07-01

## 2018-06-26 RX ORDER — HALOPERIDOL 5 MG/ML
5 INJECTION INTRAMUSCULAR EVERY 4 HOURS PRN
Status: DISCONTINUED | OUTPATIENT
Start: 2018-06-26 | End: 2018-07-10 | Stop reason: HOSPADM

## 2018-06-26 RX ORDER — BISACODYL 10 MG
10 SUPPOSITORY, RECTAL RECTAL
Status: DISCONTINUED | OUTPATIENT
Start: 2018-06-26 | End: 2018-07-10 | Stop reason: HOSPADM

## 2018-06-26 RX ORDER — OXYCODONE HYDROCHLORIDE 10 MG/1
10 TABLET ORAL
Status: DISCONTINUED | OUTPATIENT
Start: 2018-06-26 | End: 2018-06-30

## 2018-06-26 RX ORDER — M-VIT,TX,IRON,MINS/CALC/FOLIC 27MG-0.4MG
2 TABLET ORAL DAILY
Status: ON HOLD | COMMUNITY
End: 2018-07-09

## 2018-06-26 RX ADMIN — LISINOPRIL 10 MG: 5 TABLET ORAL at 20:38

## 2018-06-26 RX ADMIN — NICOTINE 21 MG: 21 PATCH, EXTENDED RELEASE TRANSDERMAL at 16:31

## 2018-06-26 RX ADMIN — LORAZEPAM 0.5 MG: 0.5 TABLET ORAL at 20:43

## 2018-06-26 RX ADMIN — AMPICILLIN AND SULBACTAM 3 G: 2; 1 INJECTION, POWDER, FOR SOLUTION INTRAVENOUS at 12:27

## 2018-06-26 RX ADMIN — SIMVASTATIN 20 MG: 20 TABLET, FILM COATED ORAL at 20:40

## 2018-06-26 RX ADMIN — SODIUM CHLORIDE 1000 ML: 9 INJECTION, SOLUTION INTRAVENOUS at 13:08

## 2018-06-26 RX ADMIN — INSULIN HUMAN 3 UNITS: 100 INJECTION, SOLUTION PARENTERAL at 20:55

## 2018-06-26 RX ADMIN — PIPERACILLIN SODIUM,TAZOBACTAM SODIUM 4.5 G: 4; .5 INJECTION, POWDER, FOR SOLUTION INTRAVENOUS at 20:52

## 2018-06-26 RX ADMIN — MULTIPLE VITAMINS W/ MINERALS TAB 2 TABLET: TAB at 20:37

## 2018-06-26 RX ADMIN — INSULIN GLARGINE 16 UNITS: 100 INJECTION, SOLUTION SUBCUTANEOUS at 20:54

## 2018-06-26 RX ADMIN — KETOROLAC TROMETHAMINE 30 MG: 30 INJECTION, SOLUTION INTRAMUSCULAR at 20:47

## 2018-06-26 RX ADMIN — LORAZEPAM 1 MG: 1 TABLET ORAL at 11:30

## 2018-06-26 RX ADMIN — VANCOMYCIN HYDROCHLORIDE 2800 MG: 10 INJECTION, POWDER, LYOPHILIZED, FOR SOLUTION INTRAVENOUS at 13:08

## 2018-06-26 ASSESSMENT — LIFESTYLE VARIABLES
EVER HAD A DRINK FIRST THING IN THE MORNING TO STEADY YOUR NERVES TO GET RID OF A HANGOVER: NO
ALCOHOL_USE: YES
HAVE PEOPLE ANNOYED YOU BY CRITICIZING YOUR DRINKING: NO
HAVE YOU EVER FELT YOU SHOULD CUT DOWN ON YOUR DRINKING: NO
CONSUMPTION TOTAL: INCOMPLETE
ON A TYPICAL DAY WHEN YOU DRINK ALCOHOL HOW MANY DRINKS DO YOU HAVE: 3
EVER_SMOKED: YES
TOTAL SCORE: 0
EVER FELT BAD OR GUILTY ABOUT YOUR DRINKING: NO
TOTAL SCORE: 0
AVERAGE NUMBER OF DAYS PER WEEK YOU HAVE A DRINK CONTAINING ALCOHOL: 5
TOTAL SCORE: 0

## 2018-06-26 ASSESSMENT — PAIN SCALES - GENERAL
PAINLEVEL_OUTOF10: 5
PAINLEVEL_OUTOF10: 0
PAINLEVEL_OUTOF10: 0
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
## 2018-06-26 NOTE — ASSESSMENT & PLAN NOTE
Clear manic behavior today. Trial of lithium. She responds well to trazadone and ativan for behavior control she says.

## 2018-06-26 NOTE — PROGRESS NOTES
2 RN skin assessment done; skin WNL.  No breakdown noted  diabetic foot ulcer present on admission

## 2018-06-26 NOTE — ASSESSMENT & PLAN NOTE
Complicated by neuropathy and renal impairment from diabetes.  Cont lantus.  Continue Insulin-sliding scale, accu-checks and hypoglycemia protocol.  A1c:7.7
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
## 2018-06-26 NOTE — ED NOTES
"Pt assessed, very anxious and rude, verbally abusive to the nurses, security at BS. Pt soaking foot at this time, but refusing IV start because it \"hurts too much.\" This RN offered to have another RN start it and pt refusing still. ERP notified. Pt given Ativan to help her relax, will try again for IV start once medication is working. Will cont to monitor.   "

## 2018-06-26 NOTE — PROGRESS NOTES
".Pharmacy Kinetics 48 y.o. female on vancomycin day # 1 2018    Currently on Vancomycin Loaded with 2800 mg in ER    Indication for Treatment: Osteomyelitis of L foot    Pertinent history per medical record: Admitted on 2018 for Sepsis (HCC);Controlled type 2 diabetes mellitus with diabetic neuropathy with long term current use of insulin. Methadone dependence.    Other antibiotics: Zosyn 4.5 G q 6 h    Allergies: Patient has no known allergies.     List concerns for renal function: BMI 39.57, Vanco/Zosyn combo    Pertinent cultures to date:   Pending    Recent Labs      18   1225   WBC  14.2*   NEUTSPOLYS  72.40*     Recent Labs      18   1225   BUN  11   CREATININE  0.81   ALBUMIN  4.4     No results for input(s): VANCOTROUGH, VANCOPEAK, VANCORANDOM in the last 72 hours.No intake or output data in the 24 hours ending 18 1652   Blood pressure 146/100, pulse (!) 102, temperature 36.9 °C (98.5 °F), resp. rate 18, height 1.676 m (5' 6\"), weight 111.2 kg (245 lb 2.4 oz), SpO2 96 %, not currently breastfeeding. Temp (24hrs), Av.9 °C (98.5 °F), Min:36.9 °C (98.5 °F), Max:36.9 °C (98.5 °F)      A/P   1. Vancomycin dose change: 1800 mg q 12h  2. Next vancomycin level:  12:30  3. Goal trough: 15  4. Comments: Will continue to monitor and adjust dose as needed per protocol.    Evette Villagran    "

## 2018-06-26 NOTE — ED NOTES
Med rec updated and complete  Allergies reviewed  Pt was not sure the dose of her medications,  [facility] called back regarding pts medications, pt last picked up medications on 6/12/2018.

## 2018-06-26 NOTE — ASSESSMENT & PLAN NOTE
This is sepsis (without associated acute organ dysfunction).   2/2 DM foot infection with osteomyelitis  Bone cultures +Diphtheroids with osteo, s/p I&D.  Continue IV vancomycin  Vancomycin will be titrated to serum concentration levels to ensure adequate levels and reduce risk of toxicity  Sepsis resolved.

## 2018-06-26 NOTE — ED NOTES
Pt getting verbally aggressive with staff when asked to take her pants off for MRI. Security called. Pt now eating. Will cont to monitor.

## 2018-06-27 PROBLEM — M86.9 OSTEOMYELITIS (HCC): Status: ACTIVE | Noted: 2018-06-27

## 2018-06-27 PROBLEM — F31.10 BIPOLAR I DISORDER WITH MANIA (HCC): Status: ACTIVE | Noted: 2018-06-27

## 2018-06-27 PROBLEM — E11.40 DIABETIC NEUROPATHY (HCC): Status: ACTIVE | Noted: 2018-06-27

## 2018-06-27 PROBLEM — E11.49 TYPE 2 DIABETES MELLITUS WITH NEUROLOGIC COMPLICATION (HCC): Status: ACTIVE | Noted: 2018-06-26

## 2018-06-27 LAB
ALBUMIN SERPL BCP-MCNC: 3.5 G/DL (ref 3.2–4.9)
ALBUMIN/GLOB SERPL: 1.3 G/DL
ALP SERPL-CCNC: 56 U/L (ref 30–99)
ALT SERPL-CCNC: 25 U/L (ref 2–50)
ANION GAP SERPL CALC-SCNC: 8 MMOL/L (ref 0–11.9)
APTT PPP: 23 SEC (ref 24.7–36)
AST SERPL-CCNC: 29 U/L (ref 12–45)
B-HCG FREE SERPL-ACNC: <5 MIU/ML
BASOPHILS # BLD AUTO: 0.3 % (ref 0–1.8)
BASOPHILS # BLD: 0.03 K/UL (ref 0–0.12)
BILIRUB SERPL-MCNC: 0.9 MG/DL (ref 0.1–1.5)
BUN SERPL-MCNC: 16 MG/DL (ref 8–22)
CALCIUM SERPL-MCNC: 7.4 MG/DL (ref 8.4–10.2)
CHLORIDE SERPL-SCNC: 102 MMOL/L (ref 96–112)
CO2 SERPL-SCNC: 19 MMOL/L (ref 20–33)
CREAT SERPL-MCNC: 1 MG/DL (ref 0.5–1.4)
EOSINOPHIL # BLD AUTO: 0.25 K/UL (ref 0–0.51)
EOSINOPHIL NFR BLD: 2.1 % (ref 0–6.9)
ERYTHROCYTE [DISTWIDTH] IN BLOOD BY AUTOMATED COUNT: 49 FL (ref 35.9–50)
GLOBULIN SER CALC-MCNC: 2.7 G/DL (ref 1.9–3.5)
GLUCOSE BLD-MCNC: 115 MG/DL (ref 65–99)
GLUCOSE BLD-MCNC: 126 MG/DL (ref 65–99)
GLUCOSE BLD-MCNC: 164 MG/DL (ref 65–99)
GLUCOSE BLD-MCNC: 240 MG/DL (ref 65–99)
GLUCOSE SERPL-MCNC: 228 MG/DL (ref 65–99)
GRAM STN SPEC: NORMAL
HCT VFR BLD AUTO: 35.9 % (ref 37–47)
HGB BLD-MCNC: 12.2 G/DL (ref 12–16)
IHCGL IHCGL: NEGATIVE MIU/ML
IMM GRANULOCYTES # BLD AUTO: 0.08 K/UL (ref 0–0.11)
IMM GRANULOCYTES NFR BLD AUTO: 0.7 % (ref 0–0.9)
INR PPP: 1.01 (ref 0.87–1.13)
LYMPHOCYTES # BLD AUTO: 2.17 K/UL (ref 1–4.8)
LYMPHOCYTES NFR BLD: 18.6 % (ref 22–41)
MCH RBC QN AUTO: 32.3 PG (ref 27–33)
MCHC RBC AUTO-ENTMCNC: 34 G/DL (ref 33.6–35)
MCV RBC AUTO: 95 FL (ref 81.4–97.8)
MONOCYTES # BLD AUTO: 0.65 K/UL (ref 0–0.85)
MONOCYTES NFR BLD AUTO: 5.6 % (ref 0–13.4)
NEUTROPHILS # BLD AUTO: 8.5 K/UL (ref 2–7.15)
NEUTROPHILS NFR BLD: 72.7 % (ref 44–72)
NRBC # BLD AUTO: 0 K/UL
NRBC BLD-RTO: 0 /100 WBC
PLATELET # BLD AUTO: 226 K/UL (ref 164–446)
PMV BLD AUTO: 9.1 FL (ref 9–12.9)
POTASSIUM SERPL-SCNC: 4 MMOL/L (ref 3.6–5.5)
PROT SERPL-MCNC: 6.2 G/DL (ref 6–8.2)
PROTHROMBIN TIME: 13.2 SEC (ref 12–14.6)
RBC # BLD AUTO: 3.78 M/UL (ref 4.2–5.4)
SIGNIFICANT IND 70042: NORMAL
SITE SITE: NORMAL
SODIUM SERPL-SCNC: 129 MMOL/L (ref 135–145)
SOURCE SOURCE: NORMAL
VANCOMYCIN TROUGH SERPL-MCNC: 15.8 UG/ML (ref 10–20)
WBC # BLD AUTO: 11.7 K/UL (ref 4.8–10.8)

## 2018-06-27 PROCEDURE — 500881 HCHG PACK, EXTREMITY: Performed by: ORTHOPAEDIC SURGERY

## 2018-06-27 PROCEDURE — 700111 HCHG RX REV CODE 636 W/ 250 OVERRIDE (IP)

## 2018-06-27 PROCEDURE — 87075 CULTR BACTERIA EXCEPT BLOOD: CPT

## 2018-06-27 PROCEDURE — 88305 TISSUE EXAM BY PATHOLOGIST: CPT

## 2018-06-27 PROCEDURE — 160009 HCHG ANES TIME/MIN: Performed by: ORTHOPAEDIC SURGERY

## 2018-06-27 PROCEDURE — 501838 HCHG SUTURE GENERAL: Performed by: ORTHOPAEDIC SURGERY

## 2018-06-27 PROCEDURE — 700102 HCHG RX REV CODE 250 W/ 637 OVERRIDE(OP): Performed by: INTERNAL MEDICINE

## 2018-06-27 PROCEDURE — 770020 HCHG ROOM/CARE - TELE (206)

## 2018-06-27 PROCEDURE — 700105 HCHG RX REV CODE 258: Performed by: INTERNAL MEDICINE

## 2018-06-27 PROCEDURE — 87205 SMEAR GRAM STAIN: CPT

## 2018-06-27 PROCEDURE — 160039 HCHG SURGERY MINUTES - EA ADDL 1 MIN LEVEL 3: Performed by: ORTHOPAEDIC SURGERY

## 2018-06-27 PROCEDURE — 82962 GLUCOSE BLOOD TEST: CPT | Mod: 91

## 2018-06-27 PROCEDURE — A9270 NON-COVERED ITEM OR SERVICE: HCPCS | Performed by: HOSPITALIST

## 2018-06-27 PROCEDURE — 97597 DBRDMT OPN WND 1ST 20 CM/<: CPT

## 2018-06-27 PROCEDURE — 88311 DECALCIFY TISSUE: CPT

## 2018-06-27 PROCEDURE — 160002 HCHG RECOVERY MINUTES (STAT): Performed by: ORTHOPAEDIC SURGERY

## 2018-06-27 PROCEDURE — 501488 HCHG SUCTION CANN, WOUNDVAC TRAC: Performed by: ORTHOPAEDIC SURGERY

## 2018-06-27 PROCEDURE — 87077 CULTURE AEROBIC IDENTIFY: CPT

## 2018-06-27 PROCEDURE — 160028 HCHG SURGERY MINUTES - 1ST 30 MINS LEVEL 3: Performed by: ORTHOPAEDIC SURGERY

## 2018-06-27 PROCEDURE — 85730 THROMBOPLASTIN TIME PARTIAL: CPT

## 2018-06-27 PROCEDURE — 99255 IP/OBS CONSLTJ NEW/EST HI 80: CPT | Performed by: INTERNAL MEDICINE

## 2018-06-27 PROCEDURE — 700102 HCHG RX REV CODE 250 W/ 637 OVERRIDE(OP)

## 2018-06-27 PROCEDURE — 87015 SPECIMEN INFECT AGNT CONCNTJ: CPT

## 2018-06-27 PROCEDURE — 160048 HCHG OR STATISTICAL LEVEL 1-5: Performed by: ORTHOPAEDIC SURGERY

## 2018-06-27 PROCEDURE — A9270 NON-COVERED ITEM OR SERVICE: HCPCS | Performed by: INTERNAL MEDICINE

## 2018-06-27 PROCEDURE — 85025 COMPLETE CBC W/AUTO DIFF WBC: CPT

## 2018-06-27 PROCEDURE — 0L8P0ZZ DIVISION OF LEFT LOWER LEG TENDON, OPEN APPROACH: ICD-10-PCS | Performed by: ORTHOPAEDIC SURGERY

## 2018-06-27 PROCEDURE — 80202 ASSAY OF VANCOMYCIN: CPT

## 2018-06-27 PROCEDURE — 0Y6N0Z9 DETACHMENT AT LEFT FOOT, PARTIAL 1ST RAY, OPEN APPROACH: ICD-10-PCS | Performed by: ORTHOPAEDIC SURGERY

## 2018-06-27 PROCEDURE — 99233 SBSQ HOSP IP/OBS HIGH 50: CPT | Performed by: HOSPITALIST

## 2018-06-27 PROCEDURE — 700105 HCHG RX REV CODE 258

## 2018-06-27 PROCEDURE — 0SBN0ZX EXCISION OF LEFT METATARSAL-PHALANGEAL JOINT, OPEN APPROACH, DIAGNOSTIC: ICD-10-PCS | Performed by: ORTHOPAEDIC SURGERY

## 2018-06-27 PROCEDURE — 501445 HCHG STAPLER, SKIN DISP: Performed by: ORTHOPAEDIC SURGERY

## 2018-06-27 PROCEDURE — 0QBP0ZZ EXCISION OF LEFT METATARSAL, OPEN APPROACH: ICD-10-PCS | Performed by: ORTHOPAEDIC SURGERY

## 2018-06-27 PROCEDURE — 80053 COMPREHEN METABOLIC PANEL: CPT

## 2018-06-27 PROCEDURE — 700101 HCHG RX REV CODE 250: Performed by: INTERNAL MEDICINE

## 2018-06-27 PROCEDURE — 85610 PROTHROMBIN TIME: CPT

## 2018-06-27 PROCEDURE — 84702 CHORIONIC GONADOTROPIN TEST: CPT

## 2018-06-27 PROCEDURE — 160035 HCHG PACU - 1ST 60 MINS PHASE I: Performed by: ORTHOPAEDIC SURGERY

## 2018-06-27 PROCEDURE — A6550 NEG PRES WOUND THER DRSG SET: HCPCS | Performed by: ORTHOPAEDIC SURGERY

## 2018-06-27 PROCEDURE — 700111 HCHG RX REV CODE 636 W/ 250 OVERRIDE (IP): Performed by: INTERNAL MEDICINE

## 2018-06-27 PROCEDURE — A9270 NON-COVERED ITEM OR SERVICE: HCPCS

## 2018-06-27 PROCEDURE — 700101 HCHG RX REV CODE 250

## 2018-06-27 PROCEDURE — 700102 HCHG RX REV CODE 250 W/ 637 OVERRIDE(OP): Performed by: HOSPITALIST

## 2018-06-27 PROCEDURE — 3E0T3BZ INTRODUCTION OF ANESTHETIC AGENT INTO PERIPHERAL NERVES AND PLEXI, PERCUTANEOUS APPROACH: ICD-10-PCS | Performed by: ORTHOPAEDIC SURGERY

## 2018-06-27 PROCEDURE — 87070 CULTURE OTHR SPECIMN AEROBIC: CPT

## 2018-06-27 RX ORDER — OXYCODONE HYDROCHLORIDE AND ACETAMINOPHEN 5; 325 MG/1; MG/1
TABLET ORAL
Status: COMPLETED
Start: 2018-06-27 | End: 2018-06-27

## 2018-06-27 RX ORDER — LITHIUM CARBONATE 150 MG/1
150 CAPSULE ORAL 3 TIMES DAILY
Status: DISCONTINUED | OUTPATIENT
Start: 2018-06-27 | End: 2018-06-30

## 2018-06-27 RX ORDER — SODIUM CHLORIDE, SODIUM LACTATE, POTASSIUM CHLORIDE, CALCIUM CHLORIDE 600; 310; 30; 20 MG/100ML; MG/100ML; MG/100ML; MG/100ML
INJECTION, SOLUTION INTRAVENOUS
Status: DISCONTINUED | OUTPATIENT
Start: 2018-06-27 | End: 2018-06-28

## 2018-06-27 RX ORDER — HYDROMORPHONE HYDROCHLORIDE 2 MG/ML
INJECTION, SOLUTION INTRAMUSCULAR; INTRAVENOUS; SUBCUTANEOUS
Status: COMPLETED
Start: 2018-06-27 | End: 2018-06-27

## 2018-06-27 RX ORDER — LITHIUM CARBONATE 150 MG/1
CAPSULE ORAL
Status: COMPLETED
Start: 2018-06-27 | End: 2018-06-27

## 2018-06-27 RX ORDER — TRAZODONE HYDROCHLORIDE 50 MG/1
100 TABLET ORAL
Status: DISCONTINUED | OUTPATIENT
Start: 2018-06-27 | End: 2018-07-10 | Stop reason: HOSPADM

## 2018-06-27 RX ADMIN — PIPERACILLIN SODIUM,TAZOBACTAM SODIUM 4.5 G: 4; .5 INJECTION, POWDER, FOR SOLUTION INTRAVENOUS at 05:38

## 2018-06-27 RX ADMIN — INSULIN HUMAN 3 UNITS: 100 INJECTION, SOLUTION PARENTERAL at 20:16

## 2018-06-27 RX ADMIN — LITHIUM CARBONATE 150 MG: 150 CAPSULE, GELATIN COATED ORAL at 22:14

## 2018-06-27 RX ADMIN — SENNOSIDES AND DOCUSATE SODIUM 2 TABLET: 8.6; 5 TABLET ORAL at 20:21

## 2018-06-27 RX ADMIN — HYDROMORPHONE HYDROCHLORIDE 0.2 MG: 2 INJECTION, SOLUTION INTRAMUSCULAR; INTRAVENOUS; SUBCUTANEOUS at 18:10

## 2018-06-27 RX ADMIN — PIPERACILLIN SODIUM,TAZOBACTAM SODIUM 4.5 G: 4; .5 INJECTION, POWDER, FOR SOLUTION INTRAVENOUS at 16:08

## 2018-06-27 RX ADMIN — SODIUM CHLORIDE, SODIUM LACTATE, POTASSIUM CHLORIDE, CALCIUM CHLORIDE: 600; 310; 30; 20 INJECTION, SOLUTION INTRAVENOUS at 13:30

## 2018-06-27 RX ADMIN — INSULIN GLARGINE 16 UNITS: 100 INJECTION, SOLUTION SUBCUTANEOUS at 20:27

## 2018-06-27 RX ADMIN — NICOTINE 21 MG: 21 PATCH, EXTENDED RELEASE TRANSDERMAL at 05:42

## 2018-06-27 RX ADMIN — SIMVASTATIN 20 MG: 20 TABLET, FILM COATED ORAL at 20:22

## 2018-06-27 RX ADMIN — LORAZEPAM 0.5 MG: 0.5 TABLET ORAL at 22:15

## 2018-06-27 RX ADMIN — OXYCODONE HYDROCHLORIDE AND ACETAMINOPHEN: 5; 325 TABLET ORAL at 18:10

## 2018-06-27 RX ADMIN — KETOROLAC TROMETHAMINE 30 MG: 30 INJECTION, SOLUTION INTRAMUSCULAR at 05:37

## 2018-06-27 RX ADMIN — PIPERACILLIN AND TAZOBACTAM: 4; .5 INJECTION, POWDER, LYOPHILIZED, FOR SOLUTION INTRAVENOUS; PARENTERAL at 22:14

## 2018-06-27 RX ADMIN — VANCOMYCIN HYDROCHLORIDE 1800 MG: 5 INJECTION, POWDER, LYOPHILIZED, FOR SOLUTION INTRAVENOUS at 01:22

## 2018-06-27 RX ADMIN — LORAZEPAM 0.5 MG: 0.5 TABLET ORAL at 10:59

## 2018-06-27 ASSESSMENT — PAIN SCALES - GENERAL
PAINLEVEL_OUTOF10: 0
PAINLEVEL_OUTOF10: 1
PAINLEVEL_OUTOF10: 0
PAINLEVEL_OUTOF10: 0
PAINLEVEL_OUTOF10: 6
PAINLEVEL_OUTOF10: 0
PAINLEVEL_OUTOF10: ASSUMED PAIN PRESENT
PAINLEVEL_OUTOF10: 2
PAINLEVEL_OUTOF10: 6

## 2018-06-27 ASSESSMENT — ENCOUNTER SYMPTOMS
TINGLING: 0
BACK PAIN: 0
NERVOUS/ANXIOUS: 1
BLURRED VISION: 0
SHORTNESS OF BREATH: 0
CHILLS: 0
VOMITING: 0
DIZZINESS: 0
HEADACHES: 0
NECK PAIN: 0
EYE PAIN: 0
PALPITATIONS: 0
INSOMNIA: 0
ABDOMINAL PAIN: 0
MEMORY LOSS: 0
COUGH: 0
DEPRESSION: 0
HALLUCINATIONS: 0
SORE THROAT: 0
FEVER: 0
NAUSEA: 0

## 2018-06-27 ASSESSMENT — PATIENT HEALTH QUESTIONNAIRE - PHQ9
SUM OF ALL RESPONSES TO PHQ9 QUESTIONS 1 AND 2: 0
2. FEELING DOWN, DEPRESSED, IRRITABLE, OR HOPELESS: NOT AT ALL
1. LITTLE INTEREST OR PLEASURE IN DOING THINGS: NOT AT ALL

## 2018-06-27 ASSESSMENT — LIFESTYLE VARIABLES: SUBSTANCE_ABUSE: 0

## 2018-06-27 NOTE — ASSESSMENT & PLAN NOTE
Patient has numerous lesions on her lower extremity consistent with bedbug bites  No bugs noted here.   Patient is homeless

## 2018-06-27 NOTE — PROGRESS NOTES
Telemetry Report: pt has been SR/ST.  HR: 80s-100  Measurements: (.20/.06/.36)  Ectopy: none    Measurements and updates per Jaswinder CORBIN

## 2018-06-27 NOTE — ASSESSMENT & PLAN NOTE
S/p OPERATION PERFORMED:  1.  Irrigation with excisional debridement, left diabetic foot ulcer.  2.  Left first ray amputation.  3.  Left gastrocnemius recession.  4.  Arthrotomy, second metatarsophalangeal joint.  5.  Placement of a negative pressure dressing of less than 50 square   centimeters.  Continue wound care.  Continue IV Vancomycin.  Vancomycin will be titrated to serum concentration levels to ensure adequate levels and reduce risk of toxicity Stop date 8/8/2018 ~ tentative.  Asked ID to see if there is a PO equivalent that is as superior.
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
## 2018-06-27 NOTE — OR NURSING
1311: Pt brought to pre-op holding via bed by transport, tolerated it well, assumed care. Constance, RN rcvd report from floor RN earlier and wrote information and gave updates to me, assumed care.  1320: Verified IV patency and started LR IV drop for surgery. Pt is mildly agitated and upset with any procedures.  1335: Patient allergies and NPO status verified, home medication reconciliation completed and belongings secured. Patient verbalizes understanding of pain scale, expected course of stay and plan of care. Surgical site verified with patient. IV access established. Sequentials placed on legs.

## 2018-06-27 NOTE — ED NOTES
Report called to Bhavana on floor. Pt refusing all medications, Bhavana notified that Vanco was only given for 2 hours of total dose.

## 2018-06-27 NOTE — CARE PLAN
Problem: Safety  Goal: Will remain free from injury  Outcome: PROGRESSING AS EXPECTED  Hourly rounding.  Non-skid socks. Bed locked & in low position. Personal belongings and call light  within reach. .      Problem: Pain Management  Goal: Pain level will decrease to patient's comfort goal  Outcome: PROGRESSING AS EXPECTED  Taught pt 0-10 pain scale and  non pharmacological method of pain mgt, encouraged to verbalize when in pain. Administered PRN pain med as needed.

## 2018-06-27 NOTE — PROGRESS NOTES
0700 Report received from [facility] nurse, Saurabh, at bedside. Pt is resting in bed.    0830 Monitored heart rhythm is SR, no ectopy (0.20/ 0.08/ 0.40), rate 80's. Respirations are regular and unlabored. Ambulates in the room w/o assistance. Call light within reach.    1100 Pt c/o anxiety before sx, ativan given-called preop to confirm if ativan is appropriate before sx.    1305 Transferred to OR.    1853 Pt is back from OR, report given to [facility] DARIANA [place].

## 2018-06-27 NOTE — ED NOTES
"Pt being taken to MRI, refusing to be on monitor because she wants to \"eat in peace.\" MRi at BS. Vancomycin unhooked for MRI, will continue medication when pt is back from MRI.  "

## 2018-06-27 NOTE — RESPIRATORY CARE
COPD EDUCATION by COPD CLINICAL EDUCATOR  6/27/2018 at 8:40 AM by Abbie Fitzpatrick     Patient reviewed by COPD education team. Patient does not qualify for COPD program.

## 2018-06-27 NOTE — ED NOTES
Pt going straight to floor from MRI, went back into the room to grab pt belongings and Vancomycin, the abx are no longer in the room and the pole and pump are cleaned and empty. No RN's touched the abx, housekeeping does not know where they are. Charge RN notified. Floor nurse notified that pt received 120 minutes of vanco.

## 2018-06-27 NOTE — PROGRESS NOTES
Bedside report given to  Jen WESTBROOK.Pt  Resting in the bed.Safety precautions in place and all the lines remain patent.

## 2018-06-27 NOTE — PROGRESS NOTES
Ms. Ivy was seen yesterday in my clinic yesterday and she was found to have an open wound over the first metatarsal head of the left foot.  She had an obvious diabetic ulcer with concern for osteomyelitis.  She was sent to the ER and has been admitted by the hospitalist for perioperative medical management.      AF/VSS  Wound over the metatarsal head of the left foot  Purulent drainage  Mild erythema about the wound  TTP about the wound and the foot    Imaging: MRI suggestive of 1st-3rd metatarsal head osteomyelitis    A: Left foot diabetic ulcer with osteomyelitis of the 1st-3rd metatarsal heads  P: To the OR today for I&D with possible transmetatarsal amputation.  Informed consent obtained and all questions answered.  She wishes to proceed.  Proper site marked.

## 2018-06-27 NOTE — ASSESSMENT & PLAN NOTE
Noted to have erratic behavior , currently pleasant.   Refuses seroquel at this time.  Behavior variable.

## 2018-06-27 NOTE — PROGRESS NOTES
Pt in bathroom with shower running  Smoking cigarettes  Pt locked door would not let staff in  Security called to remove cig from pts posession

## 2018-06-27 NOTE — PROGRESS NOTES
[facility] Hospitalist Progress Note    Date of Service: 2018    Chief Complaint  48 y.o. female admitted 2018 with diabetic foot ulceration osteomyelitis and associated sepsis.     Interval Problem Update  Manic behavior today. Very activated, pressured speech and argumentative.   We discussed her foot and planned surgery  Discussed her bipolar history- she has beed tried on risperdal and benzos only in the past. She says she did not like risperdal.     Consultants/Specialty  ortho  ID- discussed with them today.     Disposition  Hospital.     MRI foot:  1.  Severely degraded by motion artifact  2.  First metatarsal osteomyelitis spares the proximal third  3.  Second metatarsal head osteomyelitis is favored over abnormal stress response/fracture  4.  Plantar third metatarsal head osteomyelitis or reactive change is possible. Unfortunately the severe motion artifact prevents more precise characterization  5.  Great toe amputation with large cutaneous defect and adjacent cellulitis at the operative bed      Review of Systems   Constitutional: Negative for chills and fever.   HENT: Negative for sore throat.    Eyes: Negative for blurred vision and pain.   Respiratory: Negative for cough and shortness of breath.    Cardiovascular: Negative for chest pain and palpitations.   Gastrointestinal: Negative for abdominal pain, nausea and vomiting.   Genitourinary: Negative for dysuria and urgency.   Musculoskeletal: Negative for back pain and neck pain.   Skin: Negative for itching and rash.   Neurological: Negative for dizziness, tingling and headaches.   Psychiatric/Behavioral: Negative for depression, hallucinations, memory loss, substance abuse and suicidal ideas. The patient is nervous/anxious. The patient does not have insomnia.    All other systems reviewed and are negative.     Physical Exam  Laboratory/Imaging   Hemodynamics  Temp (24hrs), Av.9 °C (98.5 °F), Min:36.8 °C (98.3 °F), Max:37.1 °C (98.7 °F)    Temperature: 37.1 °C (98.7 °F)  Pulse  Av.7  Min: 95  Max: 125    Blood Pressure:  (attempted multiple times to check pt's vitals but refused.), NIBP: 140/82      Respiratory      Respiration: 18, Pulse Oximetry: 96 %     Work Of Breathing / Effort: Mild  RUL Breath Sounds: Diminished, RML Breath Sounds: Diminished, RLL Breath Sounds: Diminished, SAUL Breath Sounds: Diminished, LLL Breath Sounds: Diminished    Fluids    Intake/Output Summary (Last 24 hours) at 18 0829  Last data filed at 18 0600   Gross per 24 hour   Intake              240 ml   Output                0 ml   Net              240 ml       Nutrition  Orders Placed This Encounter   Procedures   • Diet Order Clear Liquid     Standing Status:   Standing     Number of Occurrences:   1     Order Specific Question:   Diet:     Answer:   Clear Liquid [10]   • DIET NPO     Standing Status:   Standing     Number of Occurrences:   1     Order Specific Question:   Restrict to:     Answer:   Sips with Medications [3]     Physical Exam   Constitutional: She is oriented to person, place, and time. She appears well-developed and well-nourished. No distress.   Patient seen and examined  Discussed plan with RN   SHARONT:   Right Ear: External ear normal.   Left Ear: External ear normal.   Nose: Nose normal.   Eyes: Conjunctivae are normal. Right eye exhibits no discharge. Left eye exhibits no discharge.   Neck: No JVD present.   Cardiovascular: Regular rhythm and normal heart sounds.    No murmur heard.  Cap refill 2sec  Pulses 2+ throughout     Pulmonary/Chest: Effort normal and breath sounds normal. No stridor. No respiratory distress. She has no wheezes. She has no rales.   Abdominal: Soft. Bowel sounds are normal. She exhibits no distension. There is no tenderness.   Musculoskeletal: She exhibits no edema or tenderness.   Left foot with missing great toe and cellulitis and open wound at base of amputation site. Small ulceration right great toe on base  about 4mm in size.    Neurological: She is alert and oriented to person, place, and time.   Skin: Skin is warm and dry. She is not diaphoretic. No erythema.   Normal skin  Color.    Psychiatric: Her mood appears anxious. Her affect is labile and inappropriate. She is agitated and hyperactive. She expresses impulsivity.   Pressured speech   Nursing note and vitals reviewed.      Recent Labs      06/26/18   1225   WBC  14.2*   RBC  4.42   HEMOGLOBIN  14.1   HEMATOCRIT  41.1   MCV  93.0   MCH  31.9   MCHC  34.3   RDW  47.5   PLATELETCT  261   MPV  9.1     Recent Labs      06/26/18   1225   SODIUM  131*   POTASSIUM  3.7   CHLORIDE  102   CO2  21   GLUCOSE  164*   BUN  11   CREATININE  0.81   CALCIUM  8.6                      Assessment/Plan     Bipolar I disorder with gwen ([facility])   Assessment & Plan    Clear manic behavior today. Started a Trial of lithium. She responds well to trazadone and ativan for behavior control she says.         Osteomyelitis ([facility])   Assessment & Plan    Reviewed. MRI and xray  To surgery today  Iv abx per ID          Diabetic neuropathy ([facility])   Assessment & Plan    Consider neurontin        Bed bug bite   Assessment & Plan    Patient has numerous lesions on her lower extremity consistent with bedbug bites  No bugs noted here.   She is homeless        Sepsis ([facility])   Assessment & Plan    This is sepsis (without associated acute organ dysfunction).   2/2 DM foot infection with osteomyelitis  OR planned today  Blood, wound cultures neg to date  Iv abx per ID  Iv fluids          Methadone dependence ([facility])   Assessment & Plan    Patient denies use of narcotics or methadone tox screen is positive  NO opiates should be used here.         Diabetic foot infection ([facility])   Assessment & Plan     history of amputation of left great toe last fall  this site appears to be adjacent to her surgical site  With associated osteomyelitis.   Planned to debride in OR today  Iv abx per ID        Type 2 diabetes  mellitus with neurologic complication (HCC)   Assessment & Plan    Complicated by neuropathy, hyperglycemia, vascular, infectious and renal complications.   Continue Basal  SSI  DM diet  A1C pending        Morbid obesity (HCC)   Assessment & Plan    BMI 39.57 complicated by diabetes          Quality-Core Measures

## 2018-06-27 NOTE — PROGRESS NOTES
Alert and oriented  to person,place and time. Patient refused complete assessment.on room air sat 96%. Complained of foot and back pain 5/10 at this time.Medication given per MAR. States understanding of POC. Bed locked in low position, advised to call for assistance.
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
## 2018-06-27 NOTE — PROGRESS NOTES
Security at bedside to check for cigarettes  Pt only has lighter on her which was confiscated and placed in pts in bin

## 2018-06-27 NOTE — WOUND TEAM
"[facility]ound [facility] Ostomy [facility]  Inpatient Services  Initial Wound & Skin Care Evaluation    Admission Date:  6/26/2018   HPI, PMH, SH: Reviewed  Unit where seen by Wound Team: 3313/02    WOUND CONSULT RELATED TO: R 1st toe, shins    SUBJECTIVE:   \"I had to wear some flat black shoes and I couldn't wear tennis shoes.\"    Self Report / Pain Level: no c/o pain      OBJECTIVE: sitting up in chair  WOUND TYPE, LOCATION, CHARACTERISTICS (Pressure ulcers: location, stage, POA or date identified)  Wound POA Diabetic Ulcer Digit 1;Foot Right  Periwound Skin: Calloused  Drainage : Scant, Serosanguinous      Tissue Type and %:    100% red  Wound Edges:    open    Odor:     None   Exposed structure(s):   No   Signs and Symptoms of Infection: None     Measurements: taken 6/27/18  Length (cm): 0.5  Width (cm): 0.3  Depth (cm): 0.3     Tracts/undermining: None     INTERVENTIONS BY WOUND TEAM: cleaned wound with NS, dried. Plapated DP and PT pulses, both present. Using emery board and filed down calluses. Almost reached wound bed on toe, and then used scalpel to trim off excess callus so that wound was open. Scant serosang present. Cleaned wound again. Applied piece of honey colloid to wound, covered with band-aid. Placed a \"U\" shaped non-adhesive foam secured with  hypafix.   Dressing Options:  (honey colloid, band-aid, non-ad foam \"U\" padding, hypafix)    Interdisciplinary consultation:   With Nursing;With Patient; With Dr Montgomery    EVALUATION: pt has shallow wound to R 1st toe that had callus covering wound.  She also had a callus to R 1st MTH medial. Sharp debridement done so that wound could be Tx'd. Nothing done to L 1st MTH, pt scheduled to go to [facility] today for debridement.     Factors affecting wound healing: Unable to Maintain Reduction in Weight Bearing, Blood Sugar Control, DM, neuropathy,   Goals: decreased wound size weekly      NURSING PLAN OF CARE ORDERS (X):    Dressing changes: See Dressing Maintenance orders: x  Skin " care: See Skin Care orders:   Rectal tube care: See Rectal Tube Care orders:   Other orders:    RSKIN: CURRENT (X) ORDERED (O)  Pt performs self care  Q shift Medardo:  X  Q shift pressure point assessments:  X  Pressure redistribution mattress   x     NIK      Bariatric NIK      Bariatric foam        Heel float boots       Heels floated on pillows      Barrier wipes      Barrier Cream      Barrier paste      Sacral silicone dressing      Padded O2 tubing      Anchorfast      Trach with Optifoam split foam       Waffle cushion      Rectal tube or BMS      Antifungal tx    Turn q 2 hours  Up to chair x  Ambulate x  PT/OT     Dietician      PO     TF   TPN     PVN    NPO 1  # days   Other       WOUND TEAM PLAN OF CARE (X):   NPWT change 3 x week:        Dressing changes by wound team:       Follow up as needed:   If wound worsens    Other (explain):    Anticipated discharge plans (X):  SNF:           Home Care:           Outpatient Wound Center:            Self Care:            Other:  tbd

## 2018-06-27 NOTE — PROGRESS NOTES
"Pharmacy Kinetics 48 y.o. female on vancomycin day # 2  2018    Currently on Vancomycin 1800 mg iv q12hr    Indication for Treatment: Left foot osteomyelitis    Pertinent history per medical record: Admitted on 2018 for worsening foot infection.  Post amputation of left great toe in 2017.  Xray and MRI revealed 1st and 2nd MTH osteomyelitis of the left foot.  Plan to the OR today. Dr Nelson from Infectious disease has been consulted.    Other antibiotics: Zosyn 4.5 gm every 8 hours as extended infusion    Allergies: Patient has no known allergies.     List concerns for renal function: Elevated BMI of 39.5; nephrotoxic drugs (Vancomycin and Zosyn combination)    Pertinent cultures to date:    L foot WND grm(+) cocci - preliminary    Recent Labs      18   1225  18   0936   WBC  14.2*  11.7*   NEUTSPOLYS  72.40*  72.70*     Recent Labs      18   1225  18   0936   BUN  11  16   CREATININE  0.81  1.00   ALBUMIN  4.4  3.5     Recent Labs      18   1250   VANCOTROUGH  15.8     Intake/Output Summary (Last 24 hours) at 18 1531  Last data filed at 18 0600   Gross per 24 hour   Intake              240 ml   Output                0 ml   Net              240 ml      Blood pressure 127/60, pulse 77, temperature 36.7 °C (98.1 °F), resp. rate 20, height 1.676 m (5' 6\"), weight 111.2 kg (245 lb 2.4 oz), SpO2 99 %, not currently breastfeeding. Temp (24hrs), Av.8 °C (98.3 °F), Min:36.7 °C (98.1 °F), Max:37.1 °C (98.7 °F)      A/P   1. Vancomycin dose: Continue on vancomycin 1800 mg every 12 hours.  2. Next vancomycin level: Within the next few days if vancomycin continues.  3. Goal trough: 15-16 mcg/ml.  Comments: Continue to monitor for clinical improvement.  Await culture results as well as post operative findings.    Zan Marte, PharmD BCPS      "

## 2018-06-28 LAB
ANION GAP SERPL CALC-SCNC: 5 MMOL/L (ref 0–11.9)
BACTERIA WND AEROBE CULT: NORMAL
BACTERIA WND AEROBE CULT: NORMAL
BUN SERPL-MCNC: 27 MG/DL (ref 8–22)
CALCIUM SERPL-MCNC: 8 MG/DL (ref 8.4–10.2)
CHLORIDE SERPL-SCNC: 103 MMOL/L (ref 96–112)
CO2 SERPL-SCNC: 24 MMOL/L (ref 20–33)
CREAT SERPL-MCNC: 1.24 MG/DL (ref 0.5–1.4)
GLUCOSE BLD-MCNC: 171 MG/DL (ref 65–99)
GLUCOSE BLD-MCNC: 211 MG/DL (ref 65–99)
GLUCOSE BLD-MCNC: 243 MG/DL (ref 65–99)
GLUCOSE BLD-MCNC: 272 MG/DL (ref 65–99)
GLUCOSE SERPL-MCNC: 226 MG/DL (ref 65–99)
GRAM STN SPEC: NORMAL
GRAM STN SPEC: NORMAL
POTASSIUM SERPL-SCNC: 4.3 MMOL/L (ref 3.6–5.5)
SIGNIFICANT IND 70042: NORMAL
SIGNIFICANT IND 70042: NORMAL
SITE SITE: NORMAL
SITE SITE: NORMAL
SODIUM SERPL-SCNC: 132 MMOL/L (ref 135–145)
SOURCE SOURCE: NORMAL
SOURCE SOURCE: NORMAL
VANCOMYCIN TROUGH SERPL-MCNC: 17.4 UG/ML (ref 10–20)

## 2018-06-28 PROCEDURE — 700102 HCHG RX REV CODE 250 W/ 637 OVERRIDE(OP): Performed by: HOSPITALIST

## 2018-06-28 PROCEDURE — 700105 HCHG RX REV CODE 258: Performed by: INTERNAL MEDICINE

## 2018-06-28 PROCEDURE — 99233 SBSQ HOSP IP/OBS HIGH 50: CPT | Performed by: INTERNAL MEDICINE

## 2018-06-28 PROCEDURE — 700102 HCHG RX REV CODE 250 W/ 637 OVERRIDE(OP): Performed by: INTERNAL MEDICINE

## 2018-06-28 PROCEDURE — 99233 SBSQ HOSP IP/OBS HIGH 50: CPT | Performed by: HOSPITALIST

## 2018-06-28 PROCEDURE — 80202 ASSAY OF VANCOMYCIN: CPT

## 2018-06-28 PROCEDURE — 700111 HCHG RX REV CODE 636 W/ 250 OVERRIDE (IP): Performed by: INTERNAL MEDICINE

## 2018-06-28 PROCEDURE — 700111 HCHG RX REV CODE 636 W/ 250 OVERRIDE (IP)

## 2018-06-28 PROCEDURE — 700111 HCHG RX REV CODE 636 W/ 250 OVERRIDE (IP): Performed by: HOSPITALIST

## 2018-06-28 PROCEDURE — A9270 NON-COVERED ITEM OR SERVICE: HCPCS | Performed by: INTERNAL MEDICINE

## 2018-06-28 PROCEDURE — 82962 GLUCOSE BLOOD TEST: CPT

## 2018-06-28 PROCEDURE — 700105 HCHG RX REV CODE 258: Performed by: HOSPITALIST

## 2018-06-28 PROCEDURE — 700105 HCHG RX REV CODE 258

## 2018-06-28 PROCEDURE — A9270 NON-COVERED ITEM OR SERVICE: HCPCS | Performed by: HOSPITALIST

## 2018-06-28 PROCEDURE — 770006 HCHG ROOM/CARE - MED/SURG/GYN SEMI*

## 2018-06-28 PROCEDURE — 80048 BASIC METABOLIC PNL TOTAL CA: CPT

## 2018-06-28 RX ORDER — INSULIN GLARGINE 100 [IU]/ML
0.2 INJECTION, SOLUTION SUBCUTANEOUS EVERY EVENING
Status: DISCONTINUED | OUTPATIENT
Start: 2018-06-28 | End: 2018-06-28

## 2018-06-28 RX ORDER — DEXTROSE MONOHYDRATE 25 G/50ML
25 INJECTION, SOLUTION INTRAVENOUS
Status: DISCONTINUED | OUTPATIENT
Start: 2018-06-28 | End: 2018-07-02

## 2018-06-28 RX ADMIN — OXYCODONE HYDROCHLORIDE 10 MG: 10 TABLET ORAL at 17:27

## 2018-06-28 RX ADMIN — NICOTINE POLACRILEX 2 MG: 2 GUM, CHEWING ORAL at 17:27

## 2018-06-28 RX ADMIN — LORAZEPAM 0.5 MG: 0.5 TABLET ORAL at 11:02

## 2018-06-28 RX ADMIN — KETOROLAC TROMETHAMINE 30 MG: 30 INJECTION, SOLUTION INTRAMUSCULAR at 17:27

## 2018-06-28 RX ADMIN — LORAZEPAM 0.5 MG: 0.5 TABLET ORAL at 17:26

## 2018-06-28 RX ADMIN — KETOROLAC TROMETHAMINE 30 MG: 30 INJECTION, SOLUTION INTRAMUSCULAR at 11:02

## 2018-06-28 RX ADMIN — KETOROLAC TROMETHAMINE 30 MG: 30 INJECTION, SOLUTION INTRAMUSCULAR at 00:30

## 2018-06-28 RX ADMIN — INSULIN HUMAN 3 UNITS: 100 INJECTION, SOLUTION PARENTERAL at 06:01

## 2018-06-28 RX ADMIN — KETOROLAC TROMETHAMINE 30 MG: 30 INJECTION, SOLUTION INTRAMUSCULAR at 05:48

## 2018-06-28 RX ADMIN — OXYCODONE HYDROCHLORIDE 10 MG: 10 TABLET ORAL at 11:27

## 2018-06-28 RX ADMIN — NICOTINE 21 MG: 21 PATCH, EXTENDED RELEASE TRANSDERMAL at 05:49

## 2018-06-28 RX ADMIN — NICOTINE POLACRILEX 2 MG: 2 GUM, CHEWING ORAL at 05:48

## 2018-06-28 RX ADMIN — INSULIN GLARGINE 16 UNITS: 100 INJECTION, SOLUTION SUBCUTANEOUS at 21:15

## 2018-06-28 RX ADMIN — MORPHINE SULFATE 4 MG: 4 INJECTION INTRAVENOUS at 07:00

## 2018-06-28 RX ADMIN — NICOTINE POLACRILEX 2 MG: 2 GUM, CHEWING ORAL at 11:27

## 2018-06-28 RX ADMIN — OXYCODONE HYDROCHLORIDE 5 MG: 5 TABLET ORAL at 03:49

## 2018-06-28 RX ADMIN — LISINOPRIL 10 MG: 5 TABLET ORAL at 08:57

## 2018-06-28 RX ADMIN — MULTIPLE VITAMINS W/ MINERALS TAB 2 TABLET: TAB at 08:57

## 2018-06-28 RX ADMIN — SIMVASTATIN 20 MG: 20 TABLET, FILM COATED ORAL at 21:19

## 2018-06-28 RX ADMIN — PIPERACILLIN SODIUM,TAZOBACTAM SODIUM 4.5 G: 4; .5 INJECTION, POWDER, FOR SOLUTION INTRAVENOUS at 13:14

## 2018-06-28 RX ADMIN — PIPERACILLIN SODIUM,TAZOBACTAM SODIUM 4.5 G: 4; .5 INJECTION, POWDER, FOR SOLUTION INTRAVENOUS at 21:01

## 2018-06-28 RX ADMIN — INSULIN LISPRO 7 UNITS: 100 INJECTION, SOLUTION INTRAVENOUS; SUBCUTANEOUS at 17:35

## 2018-06-28 RX ADMIN — LITHIUM CARBONATE 150 MG: 150 CAPSULE, GELATIN COATED ORAL at 21:19

## 2018-06-28 RX ADMIN — INSULIN HUMAN 3 UNITS: 100 INJECTION, SOLUTION PARENTERAL at 11:05

## 2018-06-28 RX ADMIN — VANCOMYCIN HYDROCHLORIDE 1500 MG: 10 INJECTION, POWDER, LYOPHILIZED, FOR SOLUTION INTRAVENOUS at 17:32

## 2018-06-28 RX ADMIN — LITHIUM CARBONATE 150 MG: 150 CAPSULE, GELATIN COATED ORAL at 15:46

## 2018-06-28 RX ADMIN — PIPERACILLIN SODIUM,TAZOBACTAM SODIUM 4.5 G: 4; .5 INJECTION, POWDER, FOR SOLUTION INTRAVENOUS at 05:53

## 2018-06-28 RX ADMIN — VANCOMYCIN HYDROCHLORIDE: 1 INJECTION, POWDER, LYOPHILIZED, FOR SOLUTION INTRAVENOUS at 02:56

## 2018-06-28 RX ADMIN — INSULIN LISPRO 3 UNITS: 100 INJECTION, SOLUTION INTRAVENOUS; SUBCUTANEOUS at 21:13

## 2018-06-28 RX ADMIN — INSULIN LISPRO 7 UNITS: 100 INJECTION, SOLUTION INTRAVENOUS; SUBCUTANEOUS at 17:44

## 2018-06-28 RX ADMIN — LITHIUM CARBONATE 150 MG: 150 CAPSULE, GELATIN COATED ORAL at 09:03

## 2018-06-28 ASSESSMENT — ENCOUNTER SYMPTOMS
NAUSEA: 0
COUGH: 0
SORE THROAT: 0
HEADACHES: 0
DEPRESSION: 0
ABDOMINAL PAIN: 0
SHORTNESS OF BREATH: 0
FEVER: 0
VOMITING: 0
BACK PAIN: 0
MYALGIAS: 1
TINGLING: 0
EYE PAIN: 0
NECK PAIN: 0
PALPITATIONS: 0
INSOMNIA: 0
NERVOUS/ANXIOUS: 1
MEMORY LOSS: 0
DIZZINESS: 0
BLURRED VISION: 0
CHILLS: 0
DIARRHEA: 0
HALLUCINATIONS: 0

## 2018-06-28 ASSESSMENT — PAIN SCALES - GENERAL
PAINLEVEL_OUTOF10: 5
PAINLEVEL_OUTOF10: 7
PAINLEVEL_OUTOF10: 3
PAINLEVEL_OUTOF10: 5

## 2018-06-28 ASSESSMENT — LIFESTYLE VARIABLES: SUBSTANCE_ABUSE: 0

## 2018-06-28 NOTE — PROGRESS NOTES
"Pharmacy Kinetics 48 y.o. female on vancomycin day # 3  2018    Currently on Vancomycin 1800 mg iv q12hr    Indication for Treatment: Left foot osteomyelitis    Pertinent history per medical record: Admitted on 2018 for worsening foot infection.  Post amputation of left great toe in 2017.  Xray and MRI revealed 1st and 2nd MTH osteomyelitis of the left foot.  S/P left 1st ray amputation, I&D yesterday. Dr Nelson from Infectious disease has been consulted..    Other antibiotics: Zosyn 4.5 gm every 8 hours as extended infusion    Allergies: Patient has no known allergies.     List concerns for renal function: Elevated BMI of 39.5; nephrotoxic drugs (Vancomycin and Zosyn combination)    Pertinent cultures to date:    L foot WND grm(+) cocci - preliminary    Recent Labs      18   1225  18   0936   WBC  14.2*  11.7*   NEUTSPOLYS  72.40*  72.70*     Recent Labs      18   1225  18   0936  18   0937   BUN  11  16  27*   CREATININE  0.81  1.00  1.24   ALBUMIN  4.4  3.5   --      Recent Labs      18   1250  18   1446   VANCOTROUGH  15.8  17.4     Intake/Output Summary (Last 24 hours) at 18 1548  Last data filed at 18 1200   Gross per 24 hour   Intake             2180 ml   Output               20 ml   Net             2160 ml      Blood pressure 149/70, pulse 90, temperature 37.1 °C (98.8 °F), resp. rate 18, height 1.676 m (5' 6\"), weight 111.2 kg (245 lb 2.4 oz), SpO2 98 %, not currently breastfeeding. Temp (24hrs), Av.8 °C (98.2 °F), Min:36.1 °C (97 °F), Max:37.1 °C (98.8 °F)      A/P   1. Vancomycin dose change: Vancomycin decreased to 1500 mg every 12 hours as vancomycin trough level trending up.  2. Next vancomycin level: 18 if SrCr continues to climb.  3. Goal trough: 15-16 mcg/ml  Comments: SrCr climbing up from 1 to 1.24.  Continue to monitor post op.  Evaluate SrCr in AM.  Plan to hold vancomycin if SrCr continues to climb.  Await " culture results.    Zan Marte, PharmD BCPS

## 2018-06-28 NOTE — OP REPORT
DATE OF SERVICE:  06/27/2018    PREOPERATIVE DIAGNOSES:  1.  Right diabetic foot ulcer.  2.  Right first, second, and third metatarsal head osteomyelitis.    POSTOPERATIVE DIAGNOSES:  1.  Left diabetic foot ulcer.  2.  Left first metatarsal head osteomyelitis.  3.  Gastroc tightness.    OPERATION PERFORMED:  1.  Irrigation with excisional debridement, left diabetic foot ulcer.  2.  Left first ray amputation.  3.  Left gastrocnemius recession.  4.  Arthrotomy, second metatarsophalangeal joint.  5.  Placement of a negative pressure dressing of less than 50 square   centimeters.    SURGEON:  Jose Manuel Merlos MD    ANESTHESIA:  1.  General.  2.  Block for postoperative pain control.    FIRST ASSISTANT:  Rickey Gallegos MD    COMPLICATIONS:  None.    ESTIMATED BLOOD LOSS:  10 mL.    COMPLICATIONS:  None.    INDICATIONS FOR PROCEDURE:  The patient is a 48-year-old lady who in the fall   of 2017 was found to have a diabetic foot ulcer over the left great toe.  She   was taken to the operating room by myself where I performed a great toe   amputation.  She slowly; however, was able to heal from this wound.  She   subsequently presented to my office yesterday for evaluation and was found to   have the open sore over the plantar aspect of the left foot underlying the   metacarpal head that was clearly infected.  She was subsequently admitted to   [facility] for preoperative workup as well as preparation for   today's surgery.  Due to the nature of her ulcer as well as the osteomyelitis   found on MRI, the decision was made to take her to the operating room today   for the above-mentioned procedure.    DESCRIPTION OF PROCEDURE:  On the day of surgery, the patient was seen in the   preoperative area where informed consent was obtained with all risks and   benefits of the procedure explained and all questions answered.  She wished to   proceed with the surgery.  The proper site was marked.  She was subsequently    taken to the operating room and placed in the supine position with all bony   prominences well padded.  General endotracheal anesthesia was induced.  A   tourniquet was placed in the left lower extremity and it was then prepped and   draped in the usual sterile fashion.    Timeout was performed with all persons in attendance agreeing on the proper   patient, proper surgical site and proper surgery to be performed.    An Esmarch was used to exsanguinate the left lower extremity and the   tourniquet was insufflated to 250 mmHg.  A banjo style incision was made   centered over the medial aspect of the first metatarsal.  Sharp dissection was   taken all the way down to the level of the metatarsal head.  The ulcer was   excised sharply using a 10 blade all the way down to the metatarsal head.    This was then removed and passed off to the back table for culture as well as   pathologic evaluation.  Once the metatarsal head exposed, there was obvious   erosion of the metatarsal head distally.  There was no abscess noted.  There   was significant amount of white, very unhealthy appearing tissue in the area.    This was all debrided sharply using a 10 blade.  I then obtained specimens   for culture as well as pathologic evaluation from the metatarsal heads.    I then performed a first ray resection while leaving the base of the   metatarsal for the insertion of the tibialis anterior.  This was done using a   reciprocating saw.  Once this was done, I then performed an arthrotomy of the   second metatarsal head to evaluate for MTP joint infection as well as   osteomyelitis.  There is no evidence of MTP joint infection and no evidence of   second metatarsal head osteomyelitis.  I did obtain a culture using a rongeur   of the second metatarsal head that was passed off for pathologic evaluation   to ensure there was no osteomyelitis.    The wound was then thoroughly irrigated with copious amounts of normal saline.    We then  closed the deep using 3-0 Vicryl and the skin was closed using 2-0   nylon.  There was acceptable reapproximation of the skin edges.  A negative   pressure wound VAC was then placed on to the wound in an incisional VAC style.    Care was taken to place a plastic around the edges of the wound to help   prevent the breakdown of the skin from the wound VAC sponge.  This had   excellent seal and suction.    I then performed a gastrocnemius section.  This was done by making a   longitudinal incision along the medial border of the gastrocnemius at the   myotendinous junction.  After making a skin incision, blunt dissection was   taken down to the level of the fascia, which was then incised longitudinally   in line with the skin.  I then bluntly dissected between the gastrocnemius and   the soleus myotendinous junctions.  I also bluntly dissected between the   subcutaneous tissues and the gastrocnemius tendon.  The sensory nerve was   isolated and retracted at this point.  I then was able to sharply resect the   gastrocnemius tendon using Metzenbaum scissors.  Once this was performed, I   then was able to dorsiflex the left ankle to greater than 90 degrees.  This   wound was then thoroughly irrigated with copious amounts of normal saline and   closed using 3-0 Vicryl subcutaneously and staples superficially.  This was   then dressed with Xeroform, 4x4s, and Tegaderm dressings.  The left lower   extremity was placed into a walking boot.  The tourniquet was deflated.    General endotracheal anesthesia was reversed.  She was taken to the PACU in   good and stable condition.    DISPOSITION:  She will return to the hospital floor.  She will continue IV   antibiotics per the infectious disease team.  She will be nonweightbearing to   the left lower extremity.  The boot should remain intact at all times except   for hygiene purposes.  We will plan on wound care to remove the wound VAC on   postoperative day #2 and begin an  incisional wound VAC changes as needed.       ____________________________________     MD MARIE Ramesh / CARMEN    DD:  06/27/2018 22:40:43  DT:  06/27/2018 23:09:54    D#:  [id number]  Job#:  [id number]

## 2018-06-28 NOTE — PROGRESS NOTES
Pt sleeping. No signs of distress. Wound vac is on and settings are appropriate. Call light is within reach. Fall protocol is in place. Safety precautions are in place

## 2018-06-28 NOTE — PROGRESS NOTES
Tele strip at 0727 shows SR w/ HR of 90  Measurements: .20/.08/.36    Tele Shift Summary:  Rhythm: SR  Rate: 90's  Ectopy: Per CCT Jacqueline, pt had no ectopy    Telemetry monitoring strips placed in pt chart.

## 2018-06-28 NOTE — PROGRESS NOTES
Bedside report received from Padmini WESTBROOK. Assumed care. POC discussed. Pt resting comfortably in bed. Safety precautions in place.

## 2018-06-28 NOTE — PROGRESS NOTES
Infectious Disease Progress Note    Author: Codi Nelson M.D. Date & Time of service: 2018  8:57 AM    Chief Complaint:  FU left foot osteomyelitis    Interval History:   AF no CBC s/p left 1st ray amputation, I&D yesterday, afraid to look at surgical site, tolerating abx without issues  Labs Reviewed, Medications Reviewed, Radiology Reviewed and Wound Reviewed.    Review of Systems:  Review of Systems   Constitutional: Negative for chills and fever.   Respiratory: Negative for cough and shortness of breath.    Gastrointestinal: Negative for abdominal pain, diarrhea, nausea and vomiting.   Musculoskeletal: Positive for myalgias.   Psychiatric/Behavioral: The patient is nervous/anxious.        Hemodynamics:  Temp (24hrs), Av.7 °C (98.1 °F), Min:36.1 °C (97 °F), Max:37.1 °C (98.7 °F)  Temperature: 36.9 °C (98.4 °F)  Pulse  Av.5  Min: 64  Max: 125Heart Rate (Monitored): 76  Blood Pressure: 132/61       Physical Exam:  Physical Exam   Constitutional: She is oriented to person, place, and time. She appears well-developed.   Obese   HENT:   Head: Normocephalic and atraumatic.   Eyes: EOM are normal. Pupils are equal, round, and reactive to light.   Neck: Neck supple.   Cardiovascular: Normal rate and regular rhythm.    Pulmonary/Chest: Effort normal. She has no wheezes. She has no rales.   Abdominal: Soft. There is tenderness.   Musculoskeletal:   Evidence of 1st ray on left foot with wound vac placed. Foot in boot   Neurological: She is alert and oriented to person, place, and time.   Psychiatric:   Emotionally labile    Anxious         Meds:    Current Facility-Administered Medications:   •  lithium carbonate  •  LR  •  traZODone  •  insulin glargine  •  lisinopril  •  simvastatin  •  therapeutic multivitamin-minerals  •  NS  •  senna-docusate **AND** polyethylene glycol/lytes **AND** magnesium hydroxide **AND** bisacodyl  •  Pharmacy Consult Request  •  NS  •  acetaminophen  •  LORazepam **OR**  LORazepam  •  haloperidol lactate  •  temazepam  •  nicotine **AND** Nicotine Replacement Patient Education Materials **AND** nicotine polacrilex  •  ketorolac  •  oxyCODONE immediate-release  •  oxyCODONE immediate release  •  morphine injection  •  acetaminophen  •  MD ALERT... vancomycin  •  insulin regular **AND** Accu-Chek ACHS **AND** NOTIFY MD and PharmD **AND** glucose 4 g **AND** dextrose 50%  •  piperacillin-tazobactam  •  vancomycin    Labs:  Recent Labs      06/26/18   1225  06/27/18   0936   WBC  14.2*  11.7*   RBC  4.42  3.78*   HEMOGLOBIN  14.1  12.2   HEMATOCRIT  41.1  35.9*   MCV  93.0  95.0   MCH  31.9  32.3   RDW  47.5  49.0   PLATELETCT  261  226   MPV  9.1  9.1   NEUTSPOLYS  72.40*  72.70*   LYMPHOCYTES  19.70*  18.60*   MONOCYTES  5.80  5.60   EOSINOPHILS  1.10  2.10   BASOPHILS  0.40  0.30     Recent Labs      06/26/18   1225  06/27/18   0936   SODIUM  131*  129*   POTASSIUM  3.7  4.0   CHLORIDE  102  102   CO2  21  19*   GLUCOSE  164*  228*   BUN  11  16     Recent Labs      06/26/18   1225  06/27/18   0936   ALBUMIN  4.4  3.5   TBILIRUBIN  0.8  0.9   ALKPHOSPHAT  72  56   TOTPROTEIN  7.7  6.2   ALTSGPT  28  25   ASTSGOT  29  29   CREATININE  0.81  1.00       Imaging:  Dx-chest-limited (1 View)    Result Date: 6/26/2018 6/26/2018 11:04 AM HISTORY/REASON FOR EXAM: Diabetes, great toe nonhealing wound. Amputation 7 months ago. TECHNIQUE/EXAM DESCRIPTION AND NUMBER OF VIEWS: Single portable view of the chest. COMPARISON: None. FINDINGS: Rotated to the right Cardiomediastinal contours are normal. Lungs demonstrate no consolidation. No pneumothorax is seen. Probable left os acromiale     No radiographic evidence of acute cardiopulmonary process.    Dx-foot-complete 3+ Left    Result Date: 6/26/2018 6/26/2018 11:11 AM HISTORY/REASON FOR EXAM:  Great toe nonhealing wound. TECHNIQUE/EXAM DESCRIPTION AND NUMBER OF VIEWS: 3 nonweightbearing views of the LEFT foot. COMPARISON:  None. FINDINGS: There  has been a great toe amputation. The cutaneous stump margin is irregular with gas density extending over the metatarsal head. Half of the medial aspect of the subarticular metatarsal head is irregular and absent with a moderately appearance. No other bony destruction is seen although the medial first metatarsal head sesamoid is not seen which could be surgically absent or completely eroded No acute displaced fracture is noted. There is no subluxation.     Status post great toe amputation with metatarsal head erosive changes compatible with osteomyelitis Medial forefoot skin ulceration    Mr-foot-w/o Left    Result Date: 6/26/2018 6/26/2018 2:53 PM HISTORY/REASON FOR EXAM:  Open wound. TECHNIQUE/EXAM DESCRIPTION: MRI of the LEFT foot without contrast. Using a Tribzi 1.5 Gris MRI scanner, T1 axial, sagittal, and coronal, fast spin-echo T2 fat-suppressed axial and coronal, and fast inversion recovery sagittal images were obtained. COMPARISON: X-ray same day FINDINGS: The examination is severely limited by motion artifact. Contrast was initially planned but canceled due to the degree of motion Osseous structures/cartilaginous surfaces: There has been a great toe amputation. Skin ulceration is seen at the amputation site. There is underlying bony destruction of the first metatarsal head with edema extending to the junction of the proximal thirds of the metatarsal. Only the lateral metatarsal head sesamoid is confirmed which could be from artifact, prior resection or fatty replacement due to osteomyelitis Despite the severe motion degradation, there appears to also be edema and fatty replacement of the second metatarsal head. This does not affect the neck nor the adjacent proximal phalanx. No abnormal joint effusion Here or elsewhere On coronal image 18 slight edema along the plantar aspect of the third metatarsal head is possible but this is not confirmed on any other sequences, is more likely due to adjacent/joint  fluid No abscess is detected No fracture is seen     1.  Severely degraded by motion artifact 2.  First metatarsal osteomyelitis spares the proximal third 3.  Second metatarsal head osteomyelitis is favored over abnormal stress response/fracture 4.  Plantar third metatarsal head osteomyelitis or reactive change is possible. Unfortunately the severe motion artifact prevents more precise characterization 5.  Great toe amputation with large cutaneous defect and adjacent cellulitis at the operative bed      Micro:  Results     Procedure Component Value Units Date/Time    GRAM STAIN [[id number]] Collected:  06/27/18 1644    Order Status:  Completed Specimen:  Wound Updated:  06/27/18 2307     Significant Indicator .     Source WND     Site Left Foot Bone     Gram Stain Result No organisms seen.    Narrative:       a - left foot bone and tissue  b - left second metacarpal head  c 1-2 - left foot cultures    GRAM STAIN [[id number]] Collected:  06/27/18 1700    Order Status:  Completed Specimen:  Tissue Updated:  06/27/18 2307     Significant Indicator .     Source TISS     Site Left Foot Cultures     Gram Stain Result Rare WBCs.  No organisms seen.      ANAEROBIC CULTURE [[id number]] Collected:  06/27/18 1700    Order Status:  Completed Specimen:  Other Updated:  06/27/18 1845    CULTURE TISSUE W/ GRM STAIN [[id number]] Collected:  06/27/18 1700    Order Status:  Completed Specimen:  Other Updated:  06/27/18 1845    CULTURE TISSUE W/ GRM STAIN [[id number]] Collected:  06/27/18 1644    Order Status:  Completed Specimen:  Other Updated:  06/27/18 1844    Narrative:       a - left foot bone and tissue  b - left second metacarpal head  c 1-2 - left foot cultures    ANAEROBIC CULTURE [[id number]] Collected:  06/27/18 1644    Order Status:  Completed Updated:  06/27/18 1803    Narrative:       a - left foot bone and tissue  b - left second metacarpal head  c 1-2 - left foot cultures    CULTURE WOUND W/ GRAM STAIN [[id number]]  "Collected:  06/27/18 1644    Order Status:  Canceled Specimen:  Other Updated:  06/27/18 1803    CULTURE WOUND W/ GRAM STAIN [[id number]] Collected:  06/26/18 1421    Order Status:  Completed Specimen:  Wound from Left Foot Updated:  06/27/18 1420     Significant Indicator NEG     Source WND     Site LEFT FOOT     Culture Result Wound Culture in progress.     Gram Stain Result Moderate WBCs.  Rare Gram positive cocci.      CULTURE WOUND W/ GRAM STAIN [[id number]] Collected:  06/26/18 1113    Order Status:  Completed Specimen:  Wound from Left Foot Updated:  06/27/18 1343     Significant Indicator NEG     Source WND     Site LEFT FOOT     Culture Result Wound Culture in progress.     Gram Stain Result Many WBCs.  Moderate Gram positive cocci.  Moderate Gram positive rods.      GRAM STAIN [[id number]] Collected:  06/26/18 1421    Order Status:  Completed Specimen:  Wound Updated:  06/27/18 0754     Significant Indicator .     Source WND     Site LEFT FOOT     Gram Stain Result Moderate WBCs.  Rare Gram positive cocci.      GRAM STAIN [[id number]] Collected:  06/26/18 1113    Order Status:  Completed Specimen:  Wound Updated:  06/27/18 0754     Significant Indicator .     Source WND     Site LEFT FOOT     Gram Stain Result Many WBCs.  Moderate Gram positive cocci.  Moderate Gram positive rods.      BLOOD CULTURE [[id number]] Collected:  06/26/18 1220    Order Status:  Completed Specimen:  Blood from Peripheral Updated:  06/26/18 2023    Narrative:       Per Hospital Policy: Only change Specimen Src: to \"Line\" if  specified by physician order.    BLOOD CULTURE [[id number]] Collected:  06/26/18 1300    Order Status:  Completed Specimen:  Blood from Peripheral Updated:  06/26/18 1307    Narrative:       Per Hospital Policy: Only change Specimen Src: to \"Line\" if  specified by physician order.          Assessment:  Active Hospital Problems    Diagnosis   • Diabetic neuropathy (HCC) [E11.40]   • Osteomyelitis (HCC) [M86.9]   • " Bipolar I disorder with gwen ([facility]) [F31.10]   • Morbid obesity ([facility]) [E66.01]   • Type 2 diabetes mellitus with neurologic complication ([facility]) [E11.49]   • Diabetic foot infection ([facility]) [E11.628, L08.9]   • Methadone dependence ([facility]) [F11.20]   • Sepsis ([facility]) [A41.9]   • Bed bug bite [W57.XXXA]       Plan:  Left foot osteomyelitis - ongoing work up  Afebrile  Leukocytosis  S/p left great toe amp in Nov 2017 by Dr. Merlos  MRI +OM  S/p Irrigation with excisional debridement, left diabetic foot ulcer, left first ray amputation, left gastrocnemius recession and arthrotomy, second metatarsophalangeal joint on 6/27  No evidence of osteomyelitis of 2nd MTH  OR bone cultures pending  Path - pending  Wound cultures 6/26 +GPC, GPR  Continue vanco and zosyn for now pending further cx  Monitor renal function and vanco trough  VT 15.8 on 6/27    Right great toe diabetic ulcer  Not actively infected  Wound care    DM2  HgA1c 7.7  Maintain BS less than 150 to control infection and wound healing    Bipolar disorder  Homelessness    Discussed with internal medicine/Dr Montgomery.

## 2018-06-28 NOTE — PROGRESS NOTES
Beside report received from Jen WESTBROOK. Pt back from PACU, Safety precautions in place. Call light within reach. Pt resting in bed.

## 2018-06-28 NOTE — CARE PLAN
Problem: Communication  Goal: The ability to communicate needs accurately and effectively will improve  Outcome: PROGRESSING SLOWER THAN EXPECTED  Pt manic at times, difficult to communicate with. Pt asked direct yes or no questions for clarification. Medicated per MAR for anxiety/bipolar. Pt able to communicate needs effectively with some reinforcement.    Problem: Infection  Goal: Will remain free from infection  Outcome: PROGRESSING AS EXPECTED  Pt afebrile, WBC trending down. Pt on IV ABX, surgery for wound, ID consulting. Pt needs reinforcement on infection treatment.

## 2018-06-28 NOTE — THERAPY
6/28/18  PT attempted eval.  Pt refused stating this was not a good time, and she would do it in the morning.  Pt also informed PT she will be putting her heel on the ground.  Nursing informed of refusal and plan to wt bear into the heel.   tyra

## 2018-06-28 NOTE — WOUND TEAM
Orders received for wound team to change VAC dressing over let foot incision per Dr. Merlos tomorrow and determine frequency of dressing change at that time.  Orders written and team to see tomorrow.

## 2018-06-28 NOTE — OR NURSING
1753- Patient admitted to PACU from surgery following an incision and drainage with wound vac in place. Respirations even and unlabored at this time. Sinus Rhythm noted on the telemetry monitor. SPO2 99% with oxygen infusing via facemask.    1805- Patient resting in bed. Complaints of discomfort to left leg at this time but patient denies any pain needs/intervention at this time    1810- Patient complaints of pain treated with Hydromorphone and Oxycodone-Acetaminophen respectively.     1815- Patient switched from face mask to receiving oxygen via nasal cannula. Patient denies any nausea at this time. Water given and patient able to tolerate without difficulty.     1820- Blood glucose taken and noted at 115. Water given to patient per patient request.     1830- Patient resting in bed. Patient advised to ask for needs as they arise. Patient denies any needs at this time.     1845- Report called to medical/telemetry floor.     1849- Patient transferred to floor.

## 2018-06-28 NOTE — PROGRESS NOTES
Telemetry Summary     Rhythm: SR ST  HR :  OK :0.18  QRS :0.08  QT :0.40     Additional notes: No ectopy per monitor tech

## 2018-06-28 NOTE — DISCHARGE PLANNING
Care Transition Team Assessment  SW met with pt at bedside.  Pt stated that she is currently homeless.  She has a mother that resides locally, and helps her financially.  Pt does not have a source of income, but is trying to get SSD.  Pt has a wound vac that was placed yesterday.  Cultures are pending and may need long term IV ABX.  Pt stated that she was previously at [facility] last October.  Pt may need to transition to a SNF or LTACH.  SW will continue to follow.      Information Source  Orientation : Oriented x 4  Information Given By: Patient  Who is responsible for making decisions for patient? : Patient    Readmission Evaluation  Is this a readmission?: No    Elopement Risk  Legal Hold: No  Ambulatory or Self Mobile in Wheelchair: Yes  Disoriented: No  Psychiatric Symptoms: None  History of Wandering: No  Elopement this Admit: No  Vocalizing Wanting to Leave: No  Displays Behaviors, Body Language Wanting to Leave: No-Not at Risk for Elopement  Elopement Risk: Not at Risk for Elopement    Interdisciplinary Discharge Planning  Patient or legal guardian wants to designate a caregiver (see row info): No    Discharge Preparedness  What is your plan after discharge?: Skilled nursing facility, Other (comment)  What are your discharge supports?: Parent  Prior Functional Level: Ambulatory    Functional Assesment  Prior Functional Level: Ambulatory    Finances  Financial Barriers to Discharge: Yes  Average Monthly Income: 0 $  Source of Income: None  Prescription Coverage: Yes    Vision / Hearing Impairment  Vision Impairment : No  Right Eye Vision: Impaired, Wears Glasses  Left Eye Vision: Impaired, Wears Glasses  Hearing Impairment : No    Values / Beliefs / Concerns  Values / Beliefs Concerns : No    Domestic Abuse  Have you ever been the victim of abuse or violence?: Not Sure  Physical Abuse or Sexual Abuse: Unable to Assess due to Patient Condition  Verbal Abuse or Emotional Abuse: Unable to Assess due to  Patient Condition  Possible Abuse Reported to:: Not Applicable    Discharge Risks or Barriers  Discharge risks or barriers?: Mental health, Homeless / couch surfing    Anticipated Discharge Information  Anticipated discharge disposition: LTAC, SNF

## 2018-06-28 NOTE — PROGRESS NOTES
[facility] Hospitalist Progress Note    Date of Service: 6/28/2018    Chief Complaint  48 y.o. female admitted 6/26/2018 with diabetic foot ulceration osteomyelitis and associated sepsis.     Interval Problem Update  Mood better a little today. Tolerated surgery well. No events cole frazier.     Consultants/Specialty  ortho  ID- discussed with them today.     Disposition  Hospital.     MRI foot:  1.  Severely degraded by motion artifact  2.  First metatarsal osteomyelitis spares the proximal third  3.  Second metatarsal head osteomyelitis is favored over abnormal stress response/fracture  4.  Plantar third metatarsal head osteomyelitis or reactive change is possible. Unfortunately the severe motion artifact prevents more precise characterization  5.  Great toe amputation with large cutaneous defect and adjacent cellulitis at the operative bed    OPERATION PERFORMED:  1.  Irrigation with excisional debridement, left diabetic foot ulcer.  2.  Left first ray amputation.  3.  Left gastrocnemius recession.  4.  Arthrotomy, second metatarsophalangeal joint.  5.  Placement of a negative pressure dressing of less than 50 square   Centimeters.        Review of Systems   Constitutional: Negative for chills and fever.   HENT: Negative for sore throat.    Eyes: Negative for blurred vision and pain.   Respiratory: Negative for cough and shortness of breath.    Cardiovascular: Negative for chest pain and palpitations.   Gastrointestinal: Negative for abdominal pain, nausea and vomiting.   Genitourinary: Negative for dysuria and urgency.   Musculoskeletal: Negative for back pain and neck pain.   Skin: Negative for itching and rash.   Neurological: Negative for dizziness, tingling and headaches.   Psychiatric/Behavioral: Negative for depression, hallucinations, memory loss, substance abuse and suicidal ideas. The patient is nervous/anxious. The patient does not have insomnia.    All other systems reviewed and are negative.     Physical  Exam  Laboratory/Imaging   Hemodynamics  Temp (24hrs), Av.7 °C (98.1 °F), Min:36.1 °C (97 °F), Max:37.1 °C (98.7 °F)   Temperature: 36.9 °C (98.4 °F)  Pulse  Av.5  Min: 64  Max: 125 Heart Rate (Monitored): 76  Blood Pressure: 132/61      Respiratory      Respiration: 18, Pulse Oximetry: 92 %     Work Of Breathing / Effort: Mild  RUL Breath Sounds: Diminished, RML Breath Sounds: Diminished, RLL Breath Sounds: Diminished, SAUL Breath Sounds: Diminished, LLL Breath Sounds: Diminished    Fluids    Intake/Output Summary (Last 24 hours) at 18 09  Last data filed at 18 0900   Gross per 24 hour   Intake             1380 ml   Output               20 ml   Net             1360 ml       Nutrition  Orders Placed This Encounter   Procedures   • Diet Order Diabetic     Standing Status:   Standing     Number of Occurrences:   1     Order Specific Question:   Diet:     Answer:   Diabetic [3]     Physical Exam   Constitutional: She is oriented to person, place, and time. She appears well-developed and well-nourished. No distress.   Patient seen and examined  Discussed plan with RN   SHARONT:   Right Ear: External ear normal.   Left Ear: External ear normal.   Nose: Nose normal.   Eyes: Conjunctivae are normal. Right eye exhibits no discharge. Left eye exhibits no discharge.   Neck: No JVD present.   Cardiovascular: Regular rhythm and normal heart sounds.    No murmur heard.  Cap refill 2sec  Pulses 2+ throughout     Pulmonary/Chest: Effort normal and breath sounds normal. No stridor. No respiratory distress. She has no wheezes. She has no rales.   Abdominal: Soft. Bowel sounds are normal. She exhibits no distension. There is no tenderness.   Musculoskeletal: She exhibits no edema or tenderness.   Left foot dressed.  Small ulceration right great toe on base about 4mm in size.    Neurological: She is alert and oriented to person, place, and time.   Skin: Skin is warm and dry. She is not diaphoretic. No erythema.    Normal skin  Color.    Psychiatric: Her mood appears anxious. Her affect is labile and inappropriate. She is agitated and hyperactive. She expresses impulsivity.   Pressured speech   Nursing note and vitals reviewed.      Recent Labs      06/26/18   1225  06/27/18   0936   WBC  14.2*  11.7*   RBC  4.42  3.78*   HEMOGLOBIN  14.1  12.2   HEMATOCRIT  41.1  35.9*   MCV  93.0  95.0   MCH  31.9  32.3   MCHC  34.3  34.0   RDW  47.5  49.0   PLATELETCT  261  226   MPV  9.1  9.1     Recent Labs      06/26/18   1225  06/27/18   0936   SODIUM  131*  129*   POTASSIUM  3.7  4.0   CHLORIDE  102  102   CO2  21  19*   GLUCOSE  164*  228*   BUN  11  16   CREATININE  0.81  1.00   CALCIUM  8.6  7.4*     Recent Labs      06/27/18   0936   APTT  23.0*   INR  1.01                  Assessment/Plan     Bipolar I disorder with gwen ([facility])   Assessment & Plan    Clear manic behavior today. Started a Trial of lithium. She responds well to trazadone and ativan for behavior control she says.         Osteomyelitis ([facility])   Assessment & Plan    Reviewed. MRI and xray  To surgery today  Iv abx per ID          Diabetic neuropathy ([facility])   Assessment & Plan    Consider neurontin        Bed bug bite   Assessment & Plan    Patient has numerous lesions on her lower extremity consistent with bedbug bites  No bugs noted here.   She is homeless        Sepsis ([facility])   Assessment & Plan    This is sepsis (without associated acute organ dysfunction).   2/2 DM foot infection with osteomyelitis  OR planned today  Blood, wound cultures neg to date  Iv abx per ID  Iv fluids          Methadone dependence ([facility])   Assessment & Plan    Patient denies use of narcotics or methadone tox screen is positive  NO opiates should be used here.         Diabetic foot infection ([facility])   Assessment & Plan    history of amputation of left great toe last fall  this site appears to be adjacent to her surgical site  With associated osteomyelitis.   Planned to debride in OR today  Iv  abx per ID        Type 2 diabetes mellitus with neurologic complication (HCC)   Assessment & Plan    Complicated by neuropathy, hyperglycemia, vascular, infectious and renal complications.   Continue Basal  SSI  DM diet  A1C pending        Morbid obesity (HCC)   Assessment & Plan    BMI 39.57 complicated by diabetes          Quality-Core Measures   Reviewed items::  EKG reviewed, Labs reviewed, Medications reviewed and Radiology images reviewed  Hernandez catheter::  No Hernandez  DVT prophylaxis pharmacological::  Heparin  DVT prophylaxis - mechanical:  SCDs  Ulcer Prophylaxis::  Yes  Antibiotics:  Treating active infection/contamination beyond 24 hours perioperative coverage  Assessed for rehabilitation services:  Patient was assess for and/or received rehabilitation services during this hospitalization

## 2018-06-28 NOTE — OP REPORT
Left foot first ray amputation  Pathology specimens and cultures sent  No evidence of clinic infection about the second metatarsal head  Insicional wound VAC placed  Elevate left foot   NWB LLE  Dictation to follow

## 2018-06-29 LAB
ALBUMIN SERPL BCP-MCNC: 3.2 G/DL (ref 3.2–4.9)
ALBUMIN/GLOB SERPL: 1.2 G/DL
ALP SERPL-CCNC: 49 U/L (ref 30–99)
ALT SERPL-CCNC: 26 U/L (ref 2–50)
ANION GAP SERPL CALC-SCNC: 6 MMOL/L (ref 0–11.9)
AST SERPL-CCNC: 23 U/L (ref 12–45)
BASOPHILS # BLD AUTO: 0.4 % (ref 0–1.8)
BASOPHILS # BLD: 0.05 K/UL (ref 0–0.12)
BILIRUB SERPL-MCNC: 0.8 MG/DL (ref 0.1–1.5)
BUN SERPL-MCNC: 24 MG/DL (ref 8–22)
CALCIUM SERPL-MCNC: 7.8 MG/DL (ref 8.4–10.2)
CHLORIDE SERPL-SCNC: 103 MMOL/L (ref 96–112)
CO2 SERPL-SCNC: 23 MMOL/L (ref 20–33)
CREAT SERPL-MCNC: 1.1 MG/DL (ref 0.5–1.4)
EOSINOPHIL # BLD AUTO: 0.28 K/UL (ref 0–0.51)
EOSINOPHIL NFR BLD: 2.2 % (ref 0–6.9)
ERYTHROCYTE [DISTWIDTH] IN BLOOD BY AUTOMATED COUNT: 50.3 FL (ref 35.9–50)
GLOBULIN SER CALC-MCNC: 2.6 G/DL (ref 1.9–3.5)
GLUCOSE BLD-MCNC: 147 MG/DL (ref 65–99)
GLUCOSE BLD-MCNC: 168 MG/DL (ref 65–99)
GLUCOSE BLD-MCNC: 199 MG/DL (ref 65–99)
GLUCOSE BLD-MCNC: 208 MG/DL (ref 65–99)
GLUCOSE SERPL-MCNC: 179 MG/DL (ref 65–99)
HCT VFR BLD AUTO: 34 % (ref 37–47)
HGB BLD-MCNC: 11.6 G/DL (ref 12–16)
IMM GRANULOCYTES # BLD AUTO: 0.07 K/UL (ref 0–0.11)
IMM GRANULOCYTES NFR BLD AUTO: 0.6 % (ref 0–0.9)
LYMPHOCYTES # BLD AUTO: 3.06 K/UL (ref 1–4.8)
LYMPHOCYTES NFR BLD: 24.5 % (ref 22–41)
MAGNESIUM SERPL-MCNC: 2.1 MG/DL (ref 1.5–2.5)
MCH RBC QN AUTO: 32.9 PG (ref 27–33)
MCHC RBC AUTO-ENTMCNC: 34.1 G/DL (ref 33.6–35)
MCV RBC AUTO: 96.3 FL (ref 81.4–97.8)
MONOCYTES # BLD AUTO: 0.73 K/UL (ref 0–0.85)
MONOCYTES NFR BLD AUTO: 5.8 % (ref 0–13.4)
NEUTROPHILS # BLD AUTO: 8.3 K/UL (ref 2–7.15)
NEUTROPHILS NFR BLD: 66.5 % (ref 44–72)
NRBC # BLD AUTO: 0 K/UL
NRBC BLD-RTO: 0 /100 WBC
PLATELET # BLD AUTO: 222 K/UL (ref 164–446)
PMV BLD AUTO: 9.5 FL (ref 9–12.9)
POTASSIUM SERPL-SCNC: 4.2 MMOL/L (ref 3.6–5.5)
PROT SERPL-MCNC: 5.8 G/DL (ref 6–8.2)
RBC # BLD AUTO: 3.53 M/UL (ref 4.2–5.4)
SODIUM SERPL-SCNC: 132 MMOL/L (ref 135–145)
WBC # BLD AUTO: 12.5 K/UL (ref 4.8–10.8)

## 2018-06-29 PROCEDURE — 700105 HCHG RX REV CODE 258: Performed by: HOSPITALIST

## 2018-06-29 PROCEDURE — 770006 HCHG ROOM/CARE - MED/SURG/GYN SEMI*

## 2018-06-29 PROCEDURE — 85025 COMPLETE CBC W/AUTO DIFF WBC: CPT

## 2018-06-29 PROCEDURE — 97161 PT EVAL LOW COMPLEX 20 MIN: CPT

## 2018-06-29 PROCEDURE — 80053 COMPREHEN METABOLIC PANEL: CPT

## 2018-06-29 PROCEDURE — 97605 NEG PRS WND THER DME<=50SQCM: CPT

## 2018-06-29 PROCEDURE — 97165 OT EVAL LOW COMPLEX 30 MIN: CPT

## 2018-06-29 PROCEDURE — G8988 SELF CARE GOAL STATUS: HCPCS | Mod: CI

## 2018-06-29 PROCEDURE — 700111 HCHG RX REV CODE 636 W/ 250 OVERRIDE (IP): Performed by: INTERNAL MEDICINE

## 2018-06-29 PROCEDURE — 99233 SBSQ HOSP IP/OBS HIGH 50: CPT | Performed by: HOSPITALIST

## 2018-06-29 PROCEDURE — A9270 NON-COVERED ITEM OR SERVICE: HCPCS | Performed by: INTERNAL MEDICINE

## 2018-06-29 PROCEDURE — 99233 SBSQ HOSP IP/OBS HIGH 50: CPT | Performed by: INTERNAL MEDICINE

## 2018-06-29 PROCEDURE — A9270 NON-COVERED ITEM OR SERVICE: HCPCS | Performed by: HOSPITALIST

## 2018-06-29 PROCEDURE — G8987 SELF CARE CURRENT STATUS: HCPCS | Mod: CJ

## 2018-06-29 PROCEDURE — 700111 HCHG RX REV CODE 636 W/ 250 OVERRIDE (IP): Performed by: HOSPITALIST

## 2018-06-29 PROCEDURE — 83735 ASSAY OF MAGNESIUM: CPT

## 2018-06-29 PROCEDURE — G8978 MOBILITY CURRENT STATUS: HCPCS | Mod: CK

## 2018-06-29 PROCEDURE — 700102 HCHG RX REV CODE 250 W/ 637 OVERRIDE(OP): Performed by: HOSPITALIST

## 2018-06-29 PROCEDURE — 700105 HCHG RX REV CODE 258: Performed by: INTERNAL MEDICINE

## 2018-06-29 PROCEDURE — 700102 HCHG RX REV CODE 250 W/ 637 OVERRIDE(OP): Performed by: INTERNAL MEDICINE

## 2018-06-29 PROCEDURE — 82962 GLUCOSE BLOOD TEST: CPT | Mod: 91

## 2018-06-29 PROCEDURE — G8979 MOBILITY GOAL STATUS: HCPCS | Mod: CJ

## 2018-06-29 RX ORDER — QUETIAPINE FUMARATE 25 MG/1
50 TABLET, FILM COATED ORAL EVERY EVENING
Status: DISCONTINUED | OUTPATIENT
Start: 2018-06-29 | End: 2018-07-10 | Stop reason: HOSPADM

## 2018-06-29 RX ADMIN — PIPERACILLIN SODIUM,TAZOBACTAM SODIUM 4.5 G: 4; .5 INJECTION, POWDER, FOR SOLUTION INTRAVENOUS at 12:39

## 2018-06-29 RX ADMIN — INSULIN LISPRO 7 UNITS: 100 INJECTION, SOLUTION INTRAVENOUS; SUBCUTANEOUS at 17:40

## 2018-06-29 RX ADMIN — INSULIN LISPRO 4 UNITS: 100 INJECTION, SOLUTION INTRAVENOUS; SUBCUTANEOUS at 17:42

## 2018-06-29 RX ADMIN — LITHIUM CARBONATE 150 MG: 150 CAPSULE, GELATIN COATED ORAL at 08:25

## 2018-06-29 RX ADMIN — NICOTINE POLACRILEX 2 MG: 2 GUM, CHEWING ORAL at 20:05

## 2018-06-29 RX ADMIN — VANCOMYCIN HYDROCHLORIDE 1500 MG: 10 INJECTION, POWDER, LYOPHILIZED, FOR SOLUTION INTRAVENOUS at 05:42

## 2018-06-29 RX ADMIN — LITHIUM CARBONATE 150 MG: 150 CAPSULE, GELATIN COATED ORAL at 20:06

## 2018-06-29 RX ADMIN — KETOROLAC TROMETHAMINE 30 MG: 30 INJECTION, SOLUTION INTRAMUSCULAR at 05:42

## 2018-06-29 RX ADMIN — OXYCODONE HYDROCHLORIDE 10 MG: 10 TABLET ORAL at 02:57

## 2018-06-29 RX ADMIN — NICOTINE POLACRILEX 2 MG: 2 GUM, CHEWING ORAL at 02:57

## 2018-06-29 RX ADMIN — INSULIN LISPRO 7 UNITS: 100 INJECTION, SOLUTION INTRAVENOUS; SUBCUTANEOUS at 07:27

## 2018-06-29 RX ADMIN — LORAZEPAM 0.5 MG: 0.5 TABLET ORAL at 02:57

## 2018-06-29 RX ADMIN — MULTIPLE VITAMINS W/ MINERALS TAB 2 TABLET: TAB at 08:25

## 2018-06-29 RX ADMIN — PIPERACILLIN SODIUM,TAZOBACTAM SODIUM 4.5 G: 4; .5 INJECTION, POWDER, FOR SOLUTION INTRAVENOUS at 20:04

## 2018-06-29 RX ADMIN — OXYCODONE HYDROCHLORIDE 10 MG: 10 TABLET ORAL at 08:31

## 2018-06-29 RX ADMIN — NICOTINE POLACRILEX 2 MG: 2 GUM, CHEWING ORAL at 13:52

## 2018-06-29 RX ADMIN — LISINOPRIL 10 MG: 5 TABLET ORAL at 08:25

## 2018-06-29 RX ADMIN — INSULIN LISPRO 7 UNITS: 100 INJECTION, SOLUTION INTRAVENOUS; SUBCUTANEOUS at 11:38

## 2018-06-29 RX ADMIN — NICOTINE 21 MG: 21 PATCH, EXTENDED RELEASE TRANSDERMAL at 05:40

## 2018-06-29 RX ADMIN — PIPERACILLIN SODIUM,TAZOBACTAM SODIUM 4.5 G: 4; .5 INJECTION, POWDER, FOR SOLUTION INTRAVENOUS at 05:42

## 2018-06-29 RX ADMIN — OXYCODONE HYDROCHLORIDE 10 MG: 10 TABLET ORAL at 20:05

## 2018-06-29 RX ADMIN — OXYCODONE HYDROCHLORIDE 10 MG: 10 TABLET ORAL at 23:49

## 2018-06-29 RX ADMIN — INSULIN LISPRO 3 UNITS: 100 INJECTION, SOLUTION INTRAVENOUS; SUBCUTANEOUS at 07:31

## 2018-06-29 RX ADMIN — MORPHINE SULFATE 4 MG: 4 INJECTION INTRAVENOUS at 16:05

## 2018-06-29 RX ADMIN — NICOTINE POLACRILEX 2 MG: 2 GUM, CHEWING ORAL at 08:31

## 2018-06-29 RX ADMIN — INSULIN LISPRO 3 UNITS: 100 INJECTION, SOLUTION INTRAVENOUS; SUBCUTANEOUS at 20:15

## 2018-06-29 RX ADMIN — NICOTINE POLACRILEX 2 MG: 2 GUM, CHEWING ORAL at 22:25

## 2018-06-29 RX ADMIN — INSULIN GLARGINE 16 UNITS: 100 INJECTION, SOLUTION SUBCUTANEOUS at 20:14

## 2018-06-29 RX ADMIN — LITHIUM CARBONATE 150 MG: 150 CAPSULE, GELATIN COATED ORAL at 16:05

## 2018-06-29 RX ADMIN — SIMVASTATIN 20 MG: 20 TABLET, FILM COATED ORAL at 20:05

## 2018-06-29 ASSESSMENT — GAIT ASSESSMENTS
DISTANCE (FEET): 16
ASSISTIVE DEVICE: FRONT WHEEL WALKER
GAIT LEVEL OF ASSIST: STAND BY ASSIST

## 2018-06-29 ASSESSMENT — ENCOUNTER SYMPTOMS
MYALGIAS: 1
INSOMNIA: 0
HALLUCINATIONS: 0
HEADACHES: 0
NAUSEA: 0
BLURRED VISION: 0
ABDOMINAL PAIN: 0
DIZZINESS: 0
SHORTNESS OF BREATH: 0
FEVER: 0
BACK PAIN: 0
MEMORY LOSS: 0
SORE THROAT: 0
PALPITATIONS: 0
TINGLING: 0
VOMITING: 0
COUGH: 0
EYE PAIN: 0
DEPRESSION: 0
DIARRHEA: 0
NERVOUS/ANXIOUS: 1
CHILLS: 0
NECK PAIN: 0

## 2018-06-29 ASSESSMENT — ACTIVITIES OF DAILY LIVING (ADL): TOILETING: INDEPENDENT

## 2018-06-29 ASSESSMENT — PAIN SCALES - GENERAL
PAINLEVEL_OUTOF10: 6
PAINLEVEL_OUTOF10: 5
PAINLEVEL_OUTOF10: 3
PAINLEVEL_OUTOF10: 5
PAINLEVEL_OUTOF10: 7

## 2018-06-29 ASSESSMENT — LIFESTYLE VARIABLES: SUBSTANCE_ABUSE: 0

## 2018-06-29 NOTE — THERAPY
"Physical Therapy Evaluation completed.   Bed Mobility:  Supine to Sit: Supervised  Transfers: Sit to Stand: Stand by Assist  Gait: Level Of Assist: Stand by Assist with Front-Wheel Walker       Plan of Care: Will benefit from Physical Therapy 5 times per week  Discharge Recommendations: Equipment: Will Continue to Assess for Equipment Needs. Post-acute therapy Discharge to a transitional care facility for continued skilled therapy services.    See \"Rehab Therapy-Acute\" Patient Summary Report for complete documentation.   Pt is a 48 y.o.f. referred to PT secondary to functional decline following wound I&D.  Pt is not NWB LLE.  Pt is able to maintain NWB, but she prefers to put her heel down and will heel wt bear if she is not cued for NWB.  The pt is easily agitate and can be very impolite.  the pt is limited in gait by weakness and fatigue and would benefit from cont skilled PT in acute care setting to improve gait, stength, balance and gait.  "

## 2018-06-29 NOTE — PROGRESS NOTES
[facility] Hospitalist Progress Note    Date of Service: 6/29/2018    Chief Complaint  48 y.o. female admitted 6/26/2018 with diabetic foot ulceration osteomyelitis and associated sepsis.     Interval Problem Update  Difficult with RN ove rnight. Refusing PT/OT  Sodium unchanged  Glucose better  Very agitated on occasion  Mitchell discussion with her regarding compliance and behavior. She is able to be redirected with positive encouragement.     Consultants/Specialty  ortho  ID    Disposition  Hospital.     MRI foot:  1.  Severely degraded by motion artifact  2.  First metatarsal osteomyelitis spares the proximal third  3.  Second metatarsal head osteomyelitis is favored over abnormal stress response/fracture  4.  Plantar third metatarsal head osteomyelitis or reactive change is possible. Unfortunately the severe motion artifact prevents more precise characterization  5.  Great toe amputation with large cutaneous defect and adjacent cellulitis at the operative bed    OPERATION PERFORMED:  1.  Irrigation with excisional debridement, left diabetic foot ulcer.  2.  Left first ray amputation.  3.  Left gastrocnemius recession.  4.  Arthrotomy, second metatarsophalangeal joint.  5.  Placement of a negative pressure dressing of less than 50 square   Centimeters.        Review of Systems   Constitutional: Negative for chills and fever.   HENT: Negative for sore throat.    Eyes: Negative for blurred vision and pain.   Respiratory: Negative for cough and shortness of breath.    Cardiovascular: Negative for chest pain and palpitations.   Gastrointestinal: Negative for abdominal pain, nausea and vomiting.   Genitourinary: Negative for dysuria and urgency.   Musculoskeletal: Negative for back pain and neck pain.   Skin: Negative for itching and rash.   Neurological: Negative for dizziness, tingling and headaches.   Psychiatric/Behavioral: Negative for depression, hallucinations, memory loss, substance abuse and suicidal ideas. The  patient is nervous/anxious. The patient does not have insomnia.    All other systems reviewed and are negative.     Physical Exam  Laboratory/Imaging   Hemodynamics  Temp (24hrs), Av.9 °C (98.4 °F), Min:36.6 °C (97.9 °F), Max:37.1 °C (98.8 °F)   Temperature: 36.9 °C (98.4 °F)  Pulse  Av.2  Min: 64  Max: 125    Blood Pressure: 122/57      Respiratory      Respiration: 18, Pulse Oximetry: 92 %     Work Of Breathing / Effort: Mild  RUL Breath Sounds: Diminished, RML Breath Sounds: Diminished, RLL Breath Sounds: Diminished, SAUL Breath Sounds: Diminished, LLL Breath Sounds: Diminished    Fluids    Intake/Output Summary (Last 24 hours) at 18 0912  Last data filed at 18 0900   Gross per 24 hour   Intake             2240 ml   Output                0 ml   Net             2240 ml       Nutrition  Orders Placed This Encounter   Procedures   • Diet Order Diabetic     Standing Status:   Standing     Number of Occurrences:   1     Order Specific Question:   Diet:     Answer:   Diabetic [3]     Physical Exam   Constitutional: She is oriented to person, place, and time. She appears well-developed and well-nourished. No distress.   Patient seen and examined  Discussed plan with RN   SHARONT:   Right Ear: External ear normal.   Left Ear: External ear normal.   Nose: Nose normal.   Eyes: Conjunctivae are normal. Right eye exhibits no discharge. Left eye exhibits no discharge.   Neck: No JVD present.   Cardiovascular: Regular rhythm and normal heart sounds.    No murmur heard.       Pulmonary/Chest: Effort normal and breath sounds normal. No stridor. No respiratory distress. She has no wheezes. She has no rales.   Abdominal: Soft. Bowel sounds are normal. She exhibits no distension. There is no tenderness.   Musculoskeletal: She exhibits no edema or tenderness.   Left foot dressed.  Small ulceration right great toe on base about 4mm in size.    Neurological: She is alert and oriented to person, place, and time.    Skin: Skin is warm and dry. She is not diaphoretic. No erythema.   Psychiatric: Her mood appears anxious. Her affect is labile and inappropriate. She is agitated and hyperactive. She expresses impulsivity.   Nursing note and vitals reviewed.      Recent Labs      06/26/18   1225  06/27/18   0936  06/29/18   0437   WBC  14.2*  11.7*  12.5*   RBC  4.42  3.78*  3.53*   HEMOGLOBIN  14.1  12.2  11.6*   HEMATOCRIT  41.1  35.9*  34.0*   MCV  93.0  95.0  96.3   MCH  31.9  32.3  32.9   MCHC  34.3  34.0  34.1   RDW  47.5  49.0  50.3*   PLATELETCT  261  226  222   MPV  9.1  9.1  9.5     Recent Labs      06/27/18   0936  06/28/18   0937  06/29/18   0437   SODIUM  129*  132*  132*   POTASSIUM  4.0  4.3  4.2   CHLORIDE  102  103  103   CO2  19*  24  23   GLUCOSE  228*  226*  179*   BUN  16  27*  24*   CREATININE  1.00  1.24  1.10   CALCIUM  7.4*  8.0*  7.8*     Recent Labs      06/27/18   0936   APTT  23.0*   INR  1.01                  Assessment/Plan     Bipolar I disorder with gwen ([facility])   Assessment & Plan    Clear manic behavior slightly improved. continu lithium. trazodone and ativan prn.         Osteomyelitis ([facility])   Assessment & Plan    Reviewed. MRI and xray  To surgery today  Iv abx per ID          Diabetic neuropathy ([facility])   Assessment & Plan    Consider neurontin but says she gets swelling with this.         Bed bug bite   Assessment & Plan    Patient has numerous lesions on her lower extremity consistent with bedbug bites  No bugs noted here.   She is homeless        Sepsis ([facility])   Assessment & Plan    This is sepsis (without associated acute organ dysfunction).   2/2 DM foot infection with osteomyelitis  Blood, wound cultures neg to date  Iv abx per ID  Iv fluids          Methadone dependence ([facility])   Assessment & Plan    Patient denies use of narcotics or methadone tox screen is positive  NO opiates should be used here.         Diabetic foot infection ([facility])   Assessment & Plan    history of amputation of left  great toe last fall  this site appears to be adjacent to her surgical site  With associated osteomyelitis.   S/p i/d, 1st ray amputation wound vac  Iv abx per ID        Type 2 diabetes mellitus with neurologic complication (HCC)   Assessment & Plan    Complicated by neuropathy, hyperglycemia, vascular, infectious and renal complications.   Continue Basal and lantus- titrating to control  SSI  DM diet  A1C 7.7        Morbid obesity (HCC)   Assessment & Plan    BMI 39.57 complicated by diabetes          Quality-Core Measures   Reviewed items::  EKG reviewed, Labs reviewed, Medications reviewed and Radiology images reviewed  Hernandez catheter::  No Hernandez  DVT prophylaxis pharmacological::  Heparin  DVT prophylaxis - mechanical:  SCDs  Ulcer Prophylaxis::  Yes  Antibiotics:  Treating active infection/contamination beyond 24 hours perioperative coverage  Assessed for rehabilitation services:  Patient was assess for and/or received rehabilitation services during this hospitalization

## 2018-06-29 NOTE — PROGRESS NOTES
1730: Patient is agitated, does not want to participate in physical therapy. Reports pain 7 of 10, oxycodone 10mg and 30mg Toradol administered, no aspiration observed.     1745: Patient is sitting up in bed, eating dinner, no complaints at this time.

## 2018-06-29 NOTE — PROGRESS NOTES
Pharmacist Dorian said it is ok to y site Zosyn and vancomycin as long as it they are diluted in saline which they both are. Sharona WESTBROOK read back instructions to Dorian pharmacist.

## 2018-06-29 NOTE — CARE PLAN
Problem: Safety  Goal: Will remain free from injury    Intervention: Provide assistance with mobility  Encourage to call for any assistance needed, call light within reach.      Problem: Infection  Goal: Will remain free from infection  Outcome: PROGRESSING AS EXPECTED    Intervention: Assess signs and symptoms of infection  On IV antibiotics, monitor site of infections, wound vac.      Problem: Pain Management  Goal: Pain level will decrease to patient's comfort goal  Outcome: PROGRESSING AS EXPECTED    Intervention: Educate and implement non-pharmacologic comfort measures. Examples: relaxation, distration, play therapy, activity therapy, massage, etc.  Pain assessment q 2 hours, medicated as needed, comfort measures provided.

## 2018-06-29 NOTE — WOUND TEAM
"[facility]  Inpatient Services  Wound and Skin Care Progress Note    HPI, PMH, SH: Reviewed    WOUND TEAM FOLLOW UP: VAC change  Unit where seen by Wound Team:  [facility] tele      SUBJECTIVE: Wants to be left along. \"Don't cause any more pain\"    Self Report / Pain Level: premedicated by RN    WOUND TYPE, LOCATION, CHARACTERISTICS:    Location and type of wound: Closed surgical incision, L medial foot       Periwound:   macerated    Drainage:    minimal ss  Tissue Type and %:   closed incision  Wound Edges:   approximated   Odor:    none     Exposed structure(s): none  S&S of Infection:   none   Measurements:  9.5 cm L     INTERVENTIONS BY WOUND TEAM: placed NS into VAC tube to moisten foam, then removed dressing. Cleaned incision with NS. Applied drape to kitty-incisional skin, then black foam over incision with foam button. Resumed VAC at 125 mmHg continuous. Changed dressing to R great toe. Clean with NS, covered with honey colloid, bandaid and Hypafix tape    Interdisciplinary consultation: RN , patient    EVALUATION AND PROGRESS OF WOUND(S): Initially patient was not willing to have VAC dressing changed. Returned later to change. Remained hypersensitive to procedure. RN reported patient had oral pain med, and MD did not want patient to have IV morphine. Some oozing of blood noted. May be able to dc VAC when patient is dc'd. Patient reports that she needs to go to another facility, because she cannot walk on the foot.     Rationale for changes in Plan of Care: None    Factors affecting wound healing: homeless, DM, osteo, neuropathy, bipolar  Goals: resolve drainage    NURSING PLAN OF CARE:    Dressing changes: Continue previous Dressing Maintenance orders:  X      See new Dressing Maintenance orders:       Skin care: See Skin Care orders:        Rectal tube care: See Rectal Tube Care orders:      Other orders:           WOUND TEAM PLAN OF CARE (X):   NPWT change 3 x week:        Dressing changes:    "    Follow up as needed:    X   Other:

## 2018-06-29 NOTE — PROGRESS NOTES
Infectious Disease Progress Note    Author: Codi Nelson M.D. Date & Time of service: 2018  8:51 AM    Chief Complaint:  FU left foot osteomyelitis    Interval History:   AF no CBC s/p left 1st ray amputation, I&D yesterday, afraid to look at surgical site, tolerating abx without issues   AF WBC 12.5 pt frustrated bc she did not sleep well overnight due to noise from IV box, emotionally labile and nearly in tears, OR cx NGTD  Labs Reviewed, Medications Reviewed, Radiology Reviewed and Wound Reviewed.    Review of Systems:  Review of Systems   Constitutional: Negative for chills and fever.   Respiratory: Negative for cough and shortness of breath.    Gastrointestinal: Negative for abdominal pain, diarrhea, nausea and vomiting.   Genitourinary: Negative for dysuria.   Musculoskeletal: Positive for myalgias.       Hemodynamics:  Temp (24hrs), Av.9 °C (98.4 °F), Min:36.6 °C (97.9 °F), Max:37.1 °C (98.8 °F)  Temperature: 36.9 °C (98.4 °F)  Pulse  Av  Min: 64  Max: 125   Blood Pressure: 130/63       Physical Exam:  Physical Exam   Constitutional: She is oriented to person, place, and time. She appears well-developed.   Obese   HENT:   Head: Normocephalic and atraumatic.   Eyes: EOM are normal. Pupils are equal, round, and reactive to light.   Neck: Neck supple.   Cardiovascular: Normal rate and regular rhythm.    Pulmonary/Chest: Effort normal. She has no wheezes. She has no rales.   Abdominal: Soft. There is tenderness.   Musculoskeletal:   Evidence of 1st ray on left foot with wound vac placed. Foot in boot   Neurological: She is alert and oriented to person, place, and time.   Psychiatric:   Emotionally labile    Pressured speech             Meds:    Current Facility-Administered Medications:   •  [DISCONTINUED] insulin glargine **AND** insulin lispro **AND** insulin lispro **AND** Accu-Chek ACHS **AND** NOTIFY MD and PharmD **AND** glucose 4 g **AND** dextrose 50%  •  vancomycin  •  lithium  carbonate  •  traZODone  •  insulin glargine  •  lisinopril  •  simvastatin  •  therapeutic multivitamin-minerals  •  NS  •  senna-docusate **AND** polyethylene glycol/lytes **AND** magnesium hydroxide **AND** bisacodyl  •  Pharmacy Consult Request  •  NS  •  LORazepam **OR** LORazepam  •  haloperidol lactate  •  temazepam  •  nicotine **AND** Nicotine Replacement Patient Education Materials **AND** nicotine polacrilex  •  ketorolac  •  oxyCODONE immediate-release  •  oxyCODONE immediate release  •  morphine injection  •  acetaminophen  •  MD ALERT... vancomycin  •  [DISCONTINUED] insulin regular **AND** Accu-Chek ACHS **AND** NOTIFY MD and PharmD **AND** glucose 4 g **AND** dextrose 50%  •  piperacillin-tazobactam    Labs:  Recent Labs      06/26/18   1225  06/27/18   0936  06/29/18   0437   WBC  14.2*  11.7*  12.5*   RBC  4.42  3.78*  3.53*   HEMOGLOBIN  14.1  12.2  11.6*   HEMATOCRIT  41.1  35.9*  34.0*   MCV  93.0  95.0  96.3   MCH  31.9  32.3  32.9   RDW  47.5  49.0  50.3*   PLATELETCT  261  226  222   MPV  9.1  9.1  9.5   NEUTSPOLYS  72.40*  72.70*  66.50   LYMPHOCYTES  19.70*  18.60*  24.50   MONOCYTES  5.80  5.60  5.80   EOSINOPHILS  1.10  2.10  2.20   BASOPHILS  0.40  0.30  0.40     Recent Labs      06/27/18   0936  06/28/18   0937  06/29/18   0437   SODIUM  129*  132*  132*   POTASSIUM  4.0  4.3  4.2   CHLORIDE  102  103  103   CO2  19*  24  23   GLUCOSE  228*  226*  179*   BUN  16  27*  24*     Recent Labs      06/26/18   1225  06/27/18   0936  06/28/18   0937  06/29/18   0437   ALBUMIN  4.4  3.5   --   3.2   TBILIRUBIN  0.8  0.9   --   0.8   ALKPHOSPHAT  72  56   --   49   TOTPROTEIN  7.7  6.2   --   5.8*   ALTSGPT  28  25   --   26   ASTSGOT  29  29   --   23   CREATININE  0.81  1.00  1.24  1.10       Imaging:  Dx-chest-limited (1 View)    Result Date: 6/26/2018 6/26/2018 11:04 AM HISTORY/REASON FOR EXAM: Diabetes, great toe nonhealing wound. Amputation 7 months ago. TECHNIQUE/EXAM DESCRIPTION AND  NUMBER OF VIEWS: Single portable view of the chest. COMPARISON: None. FINDINGS: Rotated to the right Cardiomediastinal contours are normal. Lungs demonstrate no consolidation. No pneumothorax is seen. Probable left os acromiale     No radiographic evidence of acute cardiopulmonary process.    Dx-foot-complete 3+ Left    Result Date: 6/26/2018 6/26/2018 11:11 AM HISTORY/REASON FOR EXAM:  Great toe nonhealing wound. TECHNIQUE/EXAM DESCRIPTION AND NUMBER OF VIEWS: 3 nonweightbearing views of the LEFT foot. COMPARISON:  None. FINDINGS: There has been a great toe amputation. The cutaneous stump margin is irregular with gas density extending over the metatarsal head. Half of the medial aspect of the subarticular metatarsal head is irregular and absent with a moderately appearance. No other bony destruction is seen although the medial first metatarsal head sesamoid is not seen which could be surgically absent or completely eroded No acute displaced fracture is noted. There is no subluxation.     Status post great toe amputation with metatarsal head erosive changes compatible with osteomyelitis Medial forefoot skin ulceration    Mr-foot-w/o Left    Result Date: 6/26/2018 6/26/2018 2:53 PM HISTORY/REASON FOR EXAM:  Open wound. TECHNIQUE/EXAM DESCRIPTION: MRI of the LEFT foot without contrast. Using a [employer]. Signa 1.5 Gris MRI scanner, T1 axial, sagittal, and coronal, fast spin-echo T2 fat-suppressed axial and coronal, and fast inversion recovery sagittal images were obtained. COMPARISON: X-ray same day FINDINGS: The examination is severely limited by motion artifact. Contrast was initially planned but canceled due to the degree of motion Osseous structures/cartilaginous surfaces: There has been a great toe amputation. Skin ulceration is seen at the amputation site. There is underlying bony destruction of the first metatarsal head with edema extending to the junction of the proximal thirds of the metatarsal. Only the lateral  metatarsal head sesamoid is confirmed which could be from artifact, prior resection or fatty replacement due to osteomyelitis Despite the severe motion degradation, there appears to also be edema and fatty replacement of the second metatarsal head. This does not affect the neck nor the adjacent proximal phalanx. No abnormal joint effusion Here or elsewhere On coronal image 18 slight edema along the plantar aspect of the third metatarsal head is possible but this is not confirmed on any other sequences, is more likely due to adjacent/joint fluid No abscess is detected No fracture is seen     1.  Severely degraded by motion artifact 2.  First metatarsal osteomyelitis spares the proximal third 3.  Second metatarsal head osteomyelitis is favored over abnormal stress response/fracture 4.  Plantar third metatarsal head osteomyelitis or reactive change is possible. Unfortunately the severe motion artifact prevents more precise characterization 5.  Great toe amputation with large cutaneous defect and adjacent cellulitis at the operative bed      Micro:  Results     Procedure Component Value Units Date/Time    CULTURE TISSUE W/ GRM STAIN [[id number]] Collected:  06/27/18 1700    Order Status:  Completed Specimen:  Tissue Updated:  06/28/18 1420     Significant Indicator NEG     Source TISS     Site Left Foot Cultures     Tissue Culture No growth at 24 hours.     Gram Stain Result Rare WBCs.  No organisms seen.      ANAEROBIC CULTURE [[id number]] Collected:  06/27/18 1700    Order Status:  Completed Specimen:  Tissue Updated:  06/28/18 1420     Significant Indicator NEG     Source TISS     Site Left Foot Cultures     Anaerobic Culture, Culture Res Culture in progress.    ANAEROBIC CULTURE [[id number]] Collected:  06/27/18 1644    Order Status:  Completed Specimen:  Wound Updated:  06/28/18 1420     Significant Indicator NEG     Source WND     Site Left Foot Bone     Anaerobic Culture, Culture Res Culture in progress.     Narrative:       a - left foot bone and tissue  b - left second metacarpal head  c 1-2 - left foot cultures    CULTURE TISSUE W/ GRM STAIN [[id number]] Collected:  06/27/18 1644    Order Status:  Completed Specimen:  Wound Updated:  06/28/18 1420     Significant Indicator NEG     Source WND     Site Left Foot Bone     Tissue Culture No growth at 24 hours.     Gram Stain Result No organisms seen.    Narrative:       a - left foot bone and tissue  b - left second metacarpal head  c 1-2 - left foot cultures    CULTURE WOUND W/ GRAM STAIN [[id number]] Collected:  06/26/18 1421    Order Status:  Completed Specimen:  Wound from Left Foot Updated:  06/28/18 0958     Gram Stain Result Moderate WBCs.  Rare Gram positive cocci.       Significant Indicator NEG     Source WND     Site LEFT FOOT     Culture Result Wound Moderate growth mixed skin kelley.    CULTURE WOUND W/ GRAM STAIN [[id number]] Collected:  06/26/18 1113    Order Status:  Completed Specimen:  Wound from Left Foot Updated:  06/28/18 0947     Gram Stain Result Many WBCs.  Moderate Gram positive cocci.  Moderate Gram positive rods.       Significant Indicator NEG     Source WND     Site LEFT FOOT     Culture Result Wound Moderate growth mixed skin kelley.    GRAM STAIN [[id number]] Collected:  06/27/18 1644    Order Status:  Completed Specimen:  Wound Updated:  06/27/18 2307     Significant Indicator .     Source WND     Site Left Foot Bone     Gram Stain Result No organisms seen.    Narrative:       a - left foot bone and tissue  b - left second metacarpal head  c 1-2 - left foot cultures    GRAM STAIN [[id number]] Collected:  06/27/18 1700    Order Status:  Completed Specimen:  Tissue Updated:  06/27/18 2307     Significant Indicator .     Source TISS     Site Left Foot Cultures     Gram Stain Result Rare WBCs.  No organisms seen.      CULTURE WOUND W/ GRAM STAIN [[id number]] Collected:  06/27/18 1644    Order Status:  Canceled Specimen:  Other Updated:  06/27/18 1803  "   GRAM STAIN [[id number]] Collected:  06/26/18 1421    Order Status:  Completed Specimen:  Wound Updated:  06/27/18 0754     Significant Indicator .     Source WND     Site LEFT FOOT     Gram Stain Result Moderate WBCs.  Rare Gram positive cocci.      GRAM STAIN [[id number]] Collected:  06/26/18 1113    Order Status:  Completed Specimen:  Wound Updated:  06/27/18 0754     Significant Indicator .     Source WND     Site LEFT FOOT     Gram Stain Result Many WBCs.  Moderate Gram positive cocci.  Moderate Gram positive rods.      BLOOD CULTURE [[id number]] Collected:  06/26/18 1220    Order Status:  Completed Specimen:  Blood from Peripheral Updated:  06/26/18 2023    Narrative:       Per Hospital Policy: Only change Specimen Src: to \"Line\" if  specified by physician order.    BLOOD CULTURE [[id number]] Collected:  06/26/18 1300    Order Status:  Completed Specimen:  Blood from Peripheral Updated:  06/26/18 1307    Narrative:       Per Hospital Policy: Only change Specimen Src: to \"Line\" if  specified by physician order.          Assessment:  Active Hospital Problems    Diagnosis   • Diabetic neuropathy ([facility]) [E11.40]   • Osteomyelitis ([facility]) [M86.9]   • Bipolar I disorder with gwen ([facility]) [F31.10]   • Morbid obesity ([facility]) [E66.01]   • Type 2 diabetes mellitus with neurologic complication ([facility]) [E11.49]   • Diabetic foot infection ([facility]) [E11.628, L08.9]   • Methadone dependence ([facility]) [F11.20]   • Sepsis ([facility]) [A41.9]   • Bed bug bite [W57.XXXA]       Plan:  Left foot osteomyelitis - ongoing work up  Afebrile  Leukocytosis  S/p left great toe amp in Nov 2017 by Dr. Merlos  MRI +OM  S/p Irrigation with excisional debridement, left diabetic foot ulcer, left first ray amputation, left gastrocnemius recession and arthrotomy, second metatarsophalangeal joint on 6/27  No evidence of osteomyelitis of 2nd MTH per OP note  OR bone cultures - NGTD  Path - pending  Wound gram stain 6/26 +GPC, GPR, Cx - mixed skin kelley  DC vanco as " no MRSA isolated  Continue zosyn for now   Duration of abx will depend further cx and path    Leukocytosis, increased today  2/2 above  On abx above  Monitor    Right great toe diabetic ulcer  Not actively infected  Wound care    DM2  HgA1c 7.7  Maintain BS less than 150 to control infection and wound healing    Bipolar disorder  Homelessness    Discussed with internal medicine/Dr Montgomery.

## 2018-06-29 NOTE — PROGRESS NOTES
Received report from DARIANA Izaguirre. Discussed plan of care, assumed care of patient. Safety measures in place.

## 2018-06-29 NOTE — PROGRESS NOTES
Pt very agitated, yelling at CNA and states she in 5/10 pain. Vitals stable. Gave PRN ativan and pain medication.

## 2018-06-29 NOTE — PROGRESS NOTES
Bedside report given to Sharona WESTBROOK. POC discussed. Pt resting comfortably in bed. Safety precautions in place.

## 2018-06-29 NOTE — PROGRESS NOTES
"IV pump intermittently alarms \"upstream occlusion\". IV tubing arranged differently around patient. Alarming stops. Wound vac frequently alarming and apparently turning itself off. Restarted WV, no further alarming.  "

## 2018-06-29 NOTE — PROGRESS NOTES
Assessment complete. Due medications discussed and given. Pt asking to speak to Dr. Grant Montgomery,  has gone home for the night. Pt very upset because she said she asked the day nurse if she could speak to the Dr five hours prior. Caridad bey RN is aware. Will talk to day shift about pt wanting to speak with Dr Montgomery. Call light within reach, pt states no needs at this time.

## 2018-06-29 NOTE — PROGRESS NOTES
Bedside report received from day shift RNMariela/Leilani. POC discussed. Pt resting in bed, even unlabored breathing. Safety precautions in place.

## 2018-06-30 LAB
BACTERIA SPEC ANAEROBE CULT: NORMAL
BACTERIA SPEC ANAEROBE CULT: NORMAL
BACTERIA TISS AEROBE CULT: ABNORMAL
GLUCOSE BLD-MCNC: 172 MG/DL (ref 65–99)
GLUCOSE BLD-MCNC: 173 MG/DL (ref 65–99)
GLUCOSE BLD-MCNC: 201 MG/DL (ref 65–99)
GLUCOSE BLD-MCNC: 263 MG/DL (ref 65–99)
GRAM STN SPEC: ABNORMAL
GRAM STN SPEC: ABNORMAL
SIGNIFICANT IND 70042: ABNORMAL
SIGNIFICANT IND 70042: ABNORMAL
SIGNIFICANT IND 70042: NORMAL
SIGNIFICANT IND 70042: NORMAL
SITE SITE: ABNORMAL
SITE SITE: ABNORMAL
SITE SITE: NORMAL
SITE SITE: NORMAL
SOURCE SOURCE: ABNORMAL
SOURCE SOURCE: ABNORMAL
SOURCE SOURCE: NORMAL
SOURCE SOURCE: NORMAL

## 2018-06-30 PROCEDURE — 700105 HCHG RX REV CODE 258: Performed by: INTERNAL MEDICINE

## 2018-06-30 PROCEDURE — 700102 HCHG RX REV CODE 250 W/ 637 OVERRIDE(OP): Performed by: HOSPITALIST

## 2018-06-30 PROCEDURE — 700111 HCHG RX REV CODE 636 W/ 250 OVERRIDE (IP): Performed by: HOSPITALIST

## 2018-06-30 PROCEDURE — 700111 HCHG RX REV CODE 636 W/ 250 OVERRIDE (IP): Performed by: INTERNAL MEDICINE

## 2018-06-30 PROCEDURE — A9270 NON-COVERED ITEM OR SERVICE: HCPCS | Performed by: HOSPITALIST

## 2018-06-30 PROCEDURE — 770006 HCHG ROOM/CARE - MED/SURG/GYN SEMI*

## 2018-06-30 PROCEDURE — 700102 HCHG RX REV CODE 250 W/ 637 OVERRIDE(OP)

## 2018-06-30 PROCEDURE — 82962 GLUCOSE BLOOD TEST: CPT

## 2018-06-30 PROCEDURE — 99232 SBSQ HOSP IP/OBS MODERATE 35: CPT | Performed by: INTERNAL MEDICINE

## 2018-06-30 PROCEDURE — 99233 SBSQ HOSP IP/OBS HIGH 50: CPT | Performed by: HOSPITALIST

## 2018-06-30 PROCEDURE — A9270 NON-COVERED ITEM OR SERVICE: HCPCS

## 2018-06-30 PROCEDURE — 700102 HCHG RX REV CODE 250 W/ 637 OVERRIDE(OP): Performed by: INTERNAL MEDICINE

## 2018-06-30 PROCEDURE — A9270 NON-COVERED ITEM OR SERVICE: HCPCS | Performed by: INTERNAL MEDICINE

## 2018-06-30 PROCEDURE — 700105 HCHG RX REV CODE 258: Performed by: HOSPITALIST

## 2018-06-30 RX ORDER — LITHIUM CARBONATE 150 MG/1
150 CAPSULE ORAL 3 TIMES DAILY
Status: DISCONTINUED | OUTPATIENT
Start: 2018-06-30 | End: 2018-07-01

## 2018-06-30 RX ORDER — LITHIUM CARBONATE 150 MG/1
CAPSULE ORAL
Status: COMPLETED
Start: 2018-06-30 | End: 2018-06-30

## 2018-06-30 RX ORDER — INSULIN GLARGINE 100 [IU]/ML
20 INJECTION, SOLUTION SUBCUTANEOUS NIGHTLY
Status: DISCONTINUED | OUTPATIENT
Start: 2018-06-30 | End: 2018-07-01

## 2018-06-30 RX ADMIN — OXYCODONE HYDROCHLORIDE 5 MG: 5 TABLET ORAL at 15:32

## 2018-06-30 RX ADMIN — PIPERACILLIN SODIUM,TAZOBACTAM SODIUM 4.5 G: 4; .5 INJECTION, POWDER, FOR SOLUTION INTRAVENOUS at 12:05

## 2018-06-30 RX ADMIN — NICOTINE POLACRILEX 2 MG: 2 GUM, CHEWING ORAL at 17:40

## 2018-06-30 RX ADMIN — INSULIN LISPRO 4 UNITS: 100 INJECTION, SOLUTION INTRAVENOUS; SUBCUTANEOUS at 17:35

## 2018-06-30 RX ADMIN — NICOTINE POLACRILEX 2 MG: 2 GUM, CHEWING ORAL at 05:14

## 2018-06-30 RX ADMIN — NICOTINE POLACRILEX 2 MG: 2 GUM, CHEWING ORAL at 09:03

## 2018-06-30 RX ADMIN — NICOTINE POLACRILEX 2 MG: 2 GUM, CHEWING ORAL at 12:20

## 2018-06-30 RX ADMIN — INSULIN LISPRO 7 UNITS: 100 INJECTION, SOLUTION INTRAVENOUS; SUBCUTANEOUS at 06:14

## 2018-06-30 RX ADMIN — LITHIUM CARBONATE 150 MG: 150 CAPSULE, GELATIN COATED ORAL at 08:05

## 2018-06-30 RX ADMIN — OXYCODONE HYDROCHLORIDE 10 MG: 10 TABLET ORAL at 05:14

## 2018-06-30 RX ADMIN — INSULIN LISPRO 3 UNITS: 100 INJECTION, SOLUTION INTRAVENOUS; SUBCUTANEOUS at 12:12

## 2018-06-30 RX ADMIN — OXYCODONE HYDROCHLORIDE 10 MG: 10 TABLET ORAL at 12:18

## 2018-06-30 RX ADMIN — SIMVASTATIN 20 MG: 20 TABLET, FILM COATED ORAL at 22:09

## 2018-06-30 RX ADMIN — INSULIN GLARGINE 20 UNITS: 100 INJECTION, SOLUTION SUBCUTANEOUS at 22:16

## 2018-06-30 RX ADMIN — VANCOMYCIN HYDROCHLORIDE 2800 MG: 5 INJECTION, POWDER, LYOPHILIZED, FOR SOLUTION INTRAVENOUS at 15:24

## 2018-06-30 RX ADMIN — SENNOSIDES AND DOCUSATE SODIUM 1 TABLET: 8.6; 5 TABLET ORAL at 22:21

## 2018-06-30 RX ADMIN — NICOTINE POLACRILEX 2 MG: 2 GUM, CHEWING ORAL at 22:20

## 2018-06-30 RX ADMIN — INSULIN LISPRO 7 UNITS: 100 INJECTION, SOLUTION INTRAVENOUS; SUBCUTANEOUS at 17:37

## 2018-06-30 RX ADMIN — OXYCODONE HYDROCHLORIDE 5 MG: 5 TABLET ORAL at 22:09

## 2018-06-30 RX ADMIN — LISINOPRIL 10 MG: 5 TABLET ORAL at 08:05

## 2018-06-30 RX ADMIN — LITHIUM CARBONATE 150 MG: 150 CAPSULE, GELATIN COATED ORAL at 22:08

## 2018-06-30 RX ADMIN — INSULIN LISPRO 7 UNITS: 100 INJECTION, SOLUTION INTRAVENOUS; SUBCUTANEOUS at 22:16

## 2018-06-30 RX ADMIN — INSULIN LISPRO 3 UNITS: 100 INJECTION, SOLUTION INTRAVENOUS; SUBCUTANEOUS at 06:11

## 2018-06-30 RX ADMIN — INSULIN LISPRO 7 UNITS: 100 INJECTION, SOLUTION INTRAVENOUS; SUBCUTANEOUS at 12:11

## 2018-06-30 RX ADMIN — MULTIPLE VITAMINS W/ MINERALS TAB 2 TABLET: TAB at 08:05

## 2018-06-30 RX ADMIN — PIPERACILLIN SODIUM,TAZOBACTAM SODIUM 4.5 G: 4; .5 INJECTION, POWDER, FOR SOLUTION INTRAVENOUS at 05:15

## 2018-06-30 RX ADMIN — NICOTINE 21 MG: 21 PATCH, EXTENDED RELEASE TRANSDERMAL at 05:14

## 2018-06-30 RX ADMIN — LITHIUM CARBONATE 150 MG: 150 CAPSULE, GELATIN COATED ORAL at 16:05

## 2018-06-30 ASSESSMENT — PAIN SCALES - GENERAL
PAINLEVEL_OUTOF10: 7
PAINLEVEL_OUTOF10: 7
PAINLEVEL_OUTOF10: 6
PAINLEVEL_OUTOF10: 3
PAINLEVEL_OUTOF10: 0
PAINLEVEL_OUTOF10: 6
PAINLEVEL_OUTOF10: 7
PAINLEVEL_OUTOF10: 0

## 2018-06-30 ASSESSMENT — ENCOUNTER SYMPTOMS
SHORTNESS OF BREATH: 0
CHILLS: 0
HALLUCINATIONS: 0
NAUSEA: 0
EYE PAIN: 0
SORE THROAT: 0
BACK PAIN: 0
VOMITING: 0
PALPITATIONS: 0
NERVOUS/ANXIOUS: 1
INSOMNIA: 0
NECK PAIN: 0
FEVER: 0
TINGLING: 0
MYALGIAS: 1
DIZZINESS: 0
COUGH: 0
DIARRHEA: 0
BLURRED VISION: 0
ABDOMINAL PAIN: 0

## 2018-06-30 NOTE — PROGRESS NOTES
NAC overnight, no new c/o, pain well controlled    AF/VSS  Cultures show GPC and GPR  Wound care changed VAC today  No surrounding erythema about the surgical wound  No drainage in the VAC today    A: Left first metatarsal head osteomyelitis now s/p first ray amputation and second MTP joint arthrotomy with gastroc recession    P: Cont wound care for q2 day VAC changes until wound healed sufficiently to no longer require VAC changes  Cont NWB LLE  Cont boot to left foot to protect the wound  Appreciate hospitalist and ID help with this patient  When deemed appropriate by ID and the hospitalist, pt may be discharged and can follow up with me in my office  Will cont to follow  Please call with any questions

## 2018-06-30 NOTE — THERAPY
6/30/18  Pt refused PT x 2 attempts stating she was not feeling well and wanted the day off.  Nursing notified of refusal.  sp

## 2018-06-30 NOTE — CARE PLAN
Problem: Knowledge Deficit  Goal: Knowledge of disease process/condition, treatment plan, diagnostic tests, and medications will improve  Outcome: PROGRESSING AS EXPECTED    Intervention: Assess knowledge level of disease process/condition, treatment plan, diagnostic tests, and medications  Patient's knowledge of plan of care, diagnostics, and medications (IV abx, insulin, etc) regularly assessed. RN answers patient questions appropriately, education provided. Patient verbalizes better understanding of their condition.        Problem: Psychosocial Needs:  Goal: Level of anxiety will decrease  Outcome: PROGRESSING AS EXPECTED    Intervention: Identify and develop with patient strategies to cope with anxiety triggers  Levels of anxiety regularly assessed by RN, medications and non-pharm interventions presented to patient as needed.

## 2018-06-30 NOTE — PROGRESS NOTES
0800Report received, plan of care reviewed and discussed, assessment complete, oriented to room, bed alarm on, nonskid socks applied, advised to call for assistance.   1000 Discussed plan of care, encouraged and answered all questions, will continue to monitor.  1200 Resting, all needs met at this time.  1400 Complaints of pain, medication administered per MAR, will continue to monitor.  1600 Complaints of pain, medication administered per MAR, MD at bedside to discuss plan of care.  1645 Report given to Mer WESTBROOK to take over care.

## 2018-06-30 NOTE — PROGRESS NOTES
[facility] Hospitalist Progress Note    Date of Service: 6/30/2018    Chief Complaint  48 y.o. female admitted 6/26/2018 with diabetic foot ulceration osteomyelitis and associated sepsis.     Interval Problem Update  Refusing labs and medications.   Refusing seroquel  Tried to discuss bipolar meds and she just becomes agitated.   Does say she would like to try and stay on lithium.   Pain in foot moderately controlled with meds.     Consultants/Specialty  ortho  ID    Disposition  Hospital.     MRI foot:  1.  Severely degraded by motion artifact  2.  First metatarsal osteomyelitis spares the proximal third  3.  Second metatarsal head osteomyelitis is favored over abnormal stress response/fracture  4.  Plantar third metatarsal head osteomyelitis or reactive change is possible. Unfortunately the severe motion artifact prevents more precise characterization  5.  Great toe amputation with large cutaneous defect and adjacent cellulitis at the operative bed    OPERATION PERFORMED:  1.  Irrigation with excisional debridement, left diabetic foot ulcer.  2.  Left first ray amputation.  3.  Left gastrocnemius recession.  4.  Arthrotomy, second metatarsophalangeal joint.  5.  Placement of a negative pressure dressing of less than 50 square   Centimeters.        Review of Systems   Constitutional: Negative for chills and fever.   HENT: Negative for sore throat.    Eyes: Negative for blurred vision and pain.   Respiratory: Negative for cough and shortness of breath.    Cardiovascular: Negative for chest pain and palpitations.   Gastrointestinal: Negative for abdominal pain and nausea.   Genitourinary: Negative for dysuria and urgency.   Musculoskeletal: Negative for back pain and neck pain.   Skin: Negative for itching and rash.   Neurological: Negative for dizziness and tingling.   Psychiatric/Behavioral: Negative for hallucinations. The patient is nervous/anxious. The patient does not have insomnia.    All other systems reviewed  and are negative.     Physical Exam  Laboratory/Imaging   Hemodynamics  Temp (24hrs), Av.7 °C (98 °F), Min:36.3 °C (97.4 °F), Max:36.9 °C (98.4 °F)   Temperature: 36.7 °C (98 °F)  Pulse  Av.6  Min: 64  Max: 125    Blood Pressure: 128/68      Respiratory      Respiration: 18, Pulse Oximetry: 91 %, O2 Daily Delivery Respiratory : Room Air with O2 Available     Work Of Breathing / Effort: Mild  RUL Breath Sounds: Clear;Diminished, RML Breath Sounds: Diminished, RLL Breath Sounds: Diminished, SAUL Breath Sounds: Clear;Diminished, LLL Breath Sounds: Diminished    Fluids    Intake/Output Summary (Last 24 hours) at 18 0821  Last data filed at 18 0600   Gross per 24 hour   Intake             1740 ml   Output                0 ml   Net             1740 ml       Nutrition  Orders Placed This Encounter   Procedures   • Diet Order Diabetic     Standing Status:   Standing     Number of Occurrences:   1     Order Specific Question:   Diet:     Answer:   Diabetic [3]     Physical Exam   Constitutional: She is oriented to person, place, and time. She appears well-developed and well-nourished. No distress.   Patient seen and examined  Discussed plan with RN   SHARONT:   Right Ear: External ear normal.   Left Ear: External ear normal.   Nose: Nose normal.   Eyes: Conjunctivae are normal. Right eye exhibits no discharge. Left eye exhibits no discharge.   Neck: No JVD present.   Cardiovascular: Regular rhythm and normal heart sounds.    No murmur heard.       Pulmonary/Chest: Effort normal and breath sounds normal. No stridor. No respiratory distress. She has no rales.   Abdominal: Soft. Bowel sounds are normal. She exhibits no distension. There is no tenderness.   Musculoskeletal: She exhibits no edema or tenderness.   Left foot dressed.  Small ulceration right great toe on base about 4mm in size.    Neurological: She is alert and oriented to person, place, and time.   Skin: Skin is warm and dry. She is not  diaphoretic. No erythema.   Psychiatric: Her mood appears anxious. Her affect is labile and inappropriate. She is agitated and hyperactive. She expresses impulsivity.   Rude and disrespectful behavior   Nursing note and vitals reviewed.      Recent Labs      06/27/18   0936  06/29/18   0437   WBC  11.7*  12.5*   RBC  3.78*  3.53*   HEMOGLOBIN  12.2  11.6*   HEMATOCRIT  35.9*  34.0*   MCV  95.0  96.3   MCH  32.3  32.9   MCHC  34.0  34.1   RDW  49.0  50.3*   PLATELETCT  226  222   MPV  9.1  9.5     Recent Labs      06/27/18   0936  06/28/18   0937  06/29/18   0437   SODIUM  129*  132*  132*   POTASSIUM  4.0  4.3  4.2   CHLORIDE  102  103  103   CO2  19*  24  23   GLUCOSE  228*  226*  179*   BUN  16  27*  24*   CREATININE  1.00  1.24  1.10   CALCIUM  7.4*  8.0*  7.8*     Recent Labs      06/27/18   0936   APTT  23.0*   INR  1.01                  Assessment/Plan     Bipolar I disorder with gwen ([facility])   Assessment & Plan    manic behavior not really better- highly labile  Refuses seroquel  Continue lithium  Prn ativan         Osteomyelitis ([facility])   Assessment & Plan    S/p surgery  Iv abx per ID          Diabetic neuropathy ([facility])   Assessment & Plan    Consider neurontin but says she gets swelling with this.         Bed bug bite   Assessment & Plan    Patient has numerous lesions on her lower extremity consistent with bedbug bites  No bugs noted here.   She is homeless        Sepsis ([facility])   Assessment & Plan    This is sepsis (without associated acute organ dysfunction).   2/2 DM foot infection with osteomyelitis  Blood, wound cultures neg to date  Iv abx per ID  Iv fluids om to stop now          Methadone dependence ([facility])   Assessment & Plan    Patient denies use of narcotics or methadone tox screen is positive  NO opiates should be used here.   Using postop oxycodone- will taper soon        Diabetic foot infection ([facility])   Assessment & Plan    history of amputation of left great toe last fall  this site appears to be  adjacent to her surgical site  With associated osteomyelitis.   S/p i/d, 1st ray amputation wound vac  Iv abx per ID        Type 2 diabetes mellitus with neurologic complication ([facility])   Assessment & Plan    Complicated by neuropathy, hyperglycemia, vascular, infectious and renal complications.   Continue Basal and lantus- titrating to control  increase lantus today  SSI/preprandial insulin  DM diet  A1C 7.7        Morbid obesity ([facility])   Assessment & Plan    BMI 39.57 complicated by diabetes          Quality-Core Measures   Reviewed items::  EKG reviewed, Labs reviewed, Medications reviewed and Radiology images reviewed  Hernandez catheter::  No Hernandez  DVT prophylaxis pharmacological::  Heparin  DVT prophylaxis - mechanical:  SCDs  Ulcer Prophylaxis::  Yes  Antibiotics:  Treating active infection/contamination beyond 24 hours perioperative coverage  Assessed for rehabilitation services:  Patient was assess for and/or received rehabilitation services during this hospitalization

## 2018-06-30 NOTE — PROGRESS NOTES
Infectious Disease Progress Note    Author: Kacey Goel M.D. Date & Time of service: 2018  1:05 PM    Chief Complaint:  FU left foot osteomyelitis    Interval History:   AF no CBC s/p left 1st ray amputation, I&D yesterday, afraid to look at surgical site, tolerating abx without issues   AF WBC 12.5 pt frustrated bc she did not sleep well overnight due to noise from IV box, emotionally labile and nearly in tears, OR cx NGTD   AF lots of complaints-mostly about blood draws and IV access  Labs Reviewed, Medications Reviewed, Radiology Reviewed and Wound Reviewed.    Review of Systems:  Review of Systems   Constitutional: Negative for chills and fever.   Respiratory: Negative for cough and shortness of breath.    Gastrointestinal: Negative for abdominal pain, diarrhea, nausea and vomiting.   Genitourinary: Negative for dysuria.   Musculoskeletal: Positive for myalgias.       Hemodynamics:  Temp (24hrs), Av.8 °C (98.2 °F), Min:36.7 °C (98 °F), Max:37.1 °C (98.8 °F)  Temperature: 37.1 °C (98.8 °F)  Pulse  Av  Min: 64  Max: 125   Blood Pressure: 150/90       Physical Exam:  Physical Exam   Constitutional:   Obese   HENT:   Mouth/Throat: No oropharyngeal exudate.   Eyes: Conjunctivae are normal. No scleral icterus.   Neck: Normal range of motion.   Cardiovascular: Normal heart sounds.    Pulmonary/Chest: No respiratory distress. She exhibits no tenderness.   Abdominal: She exhibits no distension. There is no tenderness.   Musculoskeletal: She exhibits edema.   Wound VAC LLE   Neurological:   Alert   Skin: She is not diaphoretic.   Psychiatric:   Emotionally labile    Pressured speech         Nursing note and vitals reviewed.      Meds:    Current Facility-Administered Medications:   •  QUEtiapine  •  [DISCONTINUED] insulin glargine **AND** insulin lispro **AND** insulin lispro **AND** Accu-Chek ACHS **AND** NOTIFY MD and PharmD **AND** glucose 4 g **AND** dextrose 50%  •  traZODone  •   insulin glargine  •  lisinopril  •  simvastatin  •  therapeutic multivitamin-minerals  •  NS  •  senna-docusate **AND** polyethylene glycol/lytes **AND** magnesium hydroxide **AND** bisacodyl  •  Pharmacy Consult Request  •  NS  •  LORazepam **OR** LORazepam  •  haloperidol lactate  •  temazepam  •  nicotine **AND** Nicotine Replacement Patient Education Materials **AND** nicotine polacrilex  •  oxyCODONE immediate-release  •  oxyCODONE immediate release  •  morphine injection  •  acetaminophen  •  [DISCONTINUED] insulin regular **AND** Accu-Chek ACHS **AND** NOTIFY MD and PharmD **AND** glucose 4 g **AND** dextrose 50%  •  piperacillin-tazobactam    Labs:  Recent Labs      06/29/18 0437   WBC  12.5*   RBC  3.53*   HEMOGLOBIN  11.6*   HEMATOCRIT  34.0*   MCV  96.3   MCH  32.9   RDW  50.3*   PLATELETCT  222   MPV  9.5   NEUTSPOLYS  66.50   LYMPHOCYTES  24.50   MONOCYTES  5.80   EOSINOPHILS  2.20   BASOPHILS  0.40     Recent Labs      06/28/18   0937  06/29/18   0437   SODIUM  132*  132*   POTASSIUM  4.3  4.2   CHLORIDE  103  103   CO2  24  23   GLUCOSE  226*  179*   BUN  27*  24*     Recent Labs      06/28/18 0937  06/29/18   0437   ALBUMIN   --   3.2   TBILIRUBIN   --   0.8   ALKPHOSPHAT   --   49   TOTPROTEIN   --   5.8*   ALTSGPT   --   26   ASTSGOT   --   23   CREATININE  1.24  1.10       Imaging:  Dx-chest-limited (1 View)    Result Date: 6/26/2018 6/26/2018 11:04 AM HISTORY/REASON FOR EXAM: Diabetes, great toe nonhealing wound. Amputation 7 months ago. TECHNIQUE/EXAM DESCRIPTION AND NUMBER OF VIEWS: Single portable view of the chest. COMPARISON: None. FINDINGS: Rotated to the right Cardiomediastinal contours are normal. Lungs demonstrate no consolidation. No pneumothorax is seen. Probable left os acromiale     No radiographic evidence of acute cardiopulmonary process.    Dx-foot-complete 3+ Left  Result Date: 6/26/2018 6/26/2018 11:11 AM HISTORY/REASON FOR EXAM:  Great toe nonhealing wound. TECHNIQUE/EXAM  DESCRIPTION AND NUMBER OF VIEWS: 3 nonweightbearing views of the LEFT foot. COMPARISON:  None. FINDINGS: There has been a great toe amputation. The cutaneous stump margin is irregular with gas density extending over the metatarsal head. Half of the medial aspect of the subarticular metatarsal head is irregular and absent with a moderately appearance. No other bony destruction is seen although the medial first metatarsal head sesamoid is not seen which could be surgically absent or completely eroded No acute displaced fracture is noted. There is no subluxation.   Status post great toe amputation with metatarsal head erosive changes compatible with osteomyelitis Medial forefoot skin ulceration    Mr-foot-w/o Left  Result Date: 6/26/2018  1.  Severely degraded by motion artifact 2.  First metatarsal osteomyelitis spares the proximal third 3.  Second metatarsal head osteomyelitis is favored over abnormal stress response/fracture 4.  Plantar third metatarsal head osteomyelitis or reactive change is possible. Unfortunately the severe motion artifact prevents more precise characterization 5.  Great toe amputation with large cutaneous defect and adjacent cellulitis at the operative bed      Micro:  Results     Procedure Component Value Units Date/Time    CULTURE TISSUE W/ GRM STAIN [[id number]]  (Abnormal) Collected:  06/27/18 1700    Order Status:  Completed Specimen:  Tissue Updated:  06/30/18 0833     Significant Indicator POS (POS)     Source TISS     Site Left Foot Cultures     Tissue Culture Growth noted after further incubation, see below for  organism identification.   (A)     Gram Stain Result Rare WBCs.  No organisms seen.       Tissue Culture Diphtheroids  Rare growth   (A)    ANAEROBIC CULTURE [[id number]] Collected:  06/27/18 1700    Order Status:  Completed Specimen:  Tissue Updated:  06/30/18 0833     Significant Indicator NEG     Source TISS     Site Left Foot Cultures     Anaerobic Culture, Culture Res  "Culture in progress.    ANAEROBIC CULTURE [[id number]] Collected:  06/27/18 1644    Order Status:  Completed Specimen:  Wound Updated:  06/30/18 0833     Significant Indicator NEG     Source WND     Site Left Foot Bone     Anaerobic Culture, Culture Res Culture in progress.    Narrative:       a - left foot bone and tissue  b - left second metacarpal head  c 1-2 - left foot cultures    CULTURE TISSUE W/ GRM STAIN [[id number]]  (Abnormal) Collected:  06/27/18 1644    Order Status:  Completed Specimen:  Wound Updated:  06/30/18 0833     Significant Indicator POS (POS)     Source WND     Site Left Foot Bone     Tissue Culture Growth noted after further incubation, see below for  organism identification.   (A)     Gram Stain Result No organisms seen.     Tissue Culture Diphtheroids  Light growth   (A)    Narrative:       a - left foot bone and tissue  b - left second metacarpal head  c 1-2 - left foot cultures    BLOOD CULTURE [[id number]] Collected:  06/26/18 1220    Order Status:  Completed Specimen:  Blood from Peripheral Updated:  06/30/18 0831     Significant Indicator NEG     Source BLD     Site Peripheral     Blood Culture No Growth    Note: Blood cultures are incubated for 5 days and  are monitored continuously.Positive blood cultures  are called to the RN and reported as soon as  they are identified.     Blood culture testing and Gram stain, if indicated, are  performed at [facility], [address] [place] [address].  Positive blood cultures are  sent to [facility], [address] [place] [address], for organism identification and  susceptibility testing.      Narrative:       Per Hospital Policy: Only change Specimen Src: to \"Line\" if  specified by physician order.    BLOOD CULTURE [[id number]] Collected:  06/26/18 1300    Order Status:  Completed Specimen:  Blood from Peripheral Updated:  06/30/18 0831     Significant Indicator NEG     Source BLD     Site " "Peripheral     Blood Culture No Growth    Note: Blood cultures are incubated for 5 days and  are monitored continuously.Positive blood cultures  are called to the RN and reported as soon as  they are identified.     Blood culture testing and Gram stain, if indicated, are  performed at [facility], [address] [place] [address].  Positive blood cultures are  sent to [facility], [address] [place] [address], for organism identification and  susceptibility testing.      Narrative:       Per Hospital Policy: Only change Specimen Src: to \"Line\" if  specified by physician order.    CULTURE WOUND W/ GRAM STAIN [[id number]] Collected:  06/26/18 1421    Order Status:  Completed Specimen:  Wound from Left Foot Updated:  06/28/18 0958     Gram Stain Result Moderate WBCs.  Rare Gram positive cocci.       Significant Indicator NEG     Source WND     Site LEFT FOOT     Culture Result Wound Moderate growth mixed skin kelley.    CULTURE WOUND W/ GRAM STAIN [[id number]] Collected:  06/26/18 1113    Order Status:  Completed Specimen:  Wound from Left Foot Updated:  06/28/18 0947     Gram Stain Result Many WBCs.  Moderate Gram positive cocci.  Moderate Gram positive rods.       Significant Indicator NEG     Source WND     Site LEFT FOOT     Culture Result Wound Moderate growth mixed skin kelley.    GRAM STAIN [[id number]] Collected:  06/27/18 1644    Order Status:  Completed Specimen:  Wound Updated:  06/27/18 2307     Significant Indicator .     Source WND     Site Left Foot Bone     Gram Stain Result No organisms seen.    Narrative:       a - left foot bone and tissue  b - left second metacarpal head  c 1-2 - left foot cultures    GRAM STAIN [[id number]] Collected:  06/27/18 1700    Order Status:  Completed Specimen:  Tissue Updated:  06/27/18 2307     Significant Indicator .     Source TISS     Site Left Foot Cultures     Gram Stain Result Rare WBCs.  No organisms seen.      CULTURE " WOUND W/ GRAM STAIN [[id number]] Collected:  06/27/18 1644    Order Status:  Canceled Specimen:  Other Updated:  06/27/18 1803    GRAM STAIN [[id number]] Collected:  06/26/18 1421    Order Status:  Completed Specimen:  Wound Updated:  06/27/18 0754     Significant Indicator .     Source WND     Site LEFT FOOT     Gram Stain Result Moderate WBCs.  Rare Gram positive cocci.      GRAM STAIN [[id number]] Collected:  06/26/18 1113    Order Status:  Completed Specimen:  Wound Updated:  06/27/18 0754     Significant Indicator .     Source WND     Site LEFT FOOT     Gram Stain Result Many WBCs.  Moderate Gram positive cocci.  Moderate Gram positive rods.            Assessment:  Active Hospital Problems    Diagnosis   • Diabetic neuropathy ([facility]) [E11.40]   • Osteomyelitis ([facility]) [M86.9]   • Bipolar I disorder with gwen ([facility]) [F31.10]   • Morbid obesity ([facility]) [E66.01]   • Type 2 diabetes mellitus with neurologic complication ([facility]) [E11.49]   • Diabetic foot infection ([facility]) [E11.628, L08.9]   • Methadone dependence ([facility]) [F11.20]   • Sepsis ([facility]) [A41.9]   • Bed bug bite [W57.XXXA]       Plan:  Left foot osteomyelitis - ongoing work up  Afebrile  Leukocytosis  S/p left great toe amp in Nov 2017 by Dr. Merlos  MRI +OM digits 1-3  S/p Irrigation with excisional debridement, left diabetic foot ulcer, left first ray amputation, left gastrocnemius recession and arthrotomy, second metatarsophalangeal joint on 6/27  OR bone cultures - diptheroids  Path - pending  PICC  Change to vancomycin due to diptheroids-monitor renal function  Anticipate 6 weeks abx-from date of surgery  Stop date 8/8/2018    Leukocytosis  2/2 above  On abx above  Monitor    Right great toe diabetic ulcer  Not actively infected  Wound care    DM2  HgA1c 7.7  Maintain BS less than 150 to control infection and wound healing    Bipolar disorder  Homelessness    Will need placement  Discussed with internal medicine/Dr Montgomery.

## 2018-06-30 NOTE — PROGRESS NOTES
1900 Received report from day shift DARIANA Del Angel. Plan of care discussed at bedside. Patient resting comfortably in bed at this time with no complaints. Safety precautions and hourly rounding in place.    2000 Assessment completed and due medications given; 7/10 pain, oxycodone given per mar. FSBS 199.     2350 Patient has 7/10 pain, oxycodone administered. Warm milk provided for comfort and to promote sleep.    0200 Patient up to bedside commode.    0440 Lab present at bedside, patient refuses blood draws, becomes agitated.    0515 Patient complaining of 7/10 foot and toe pain (oxy given), IV abx hung, nicotine patch/gum administered.    0615 FSBS 173; humalog given accordingly per mar.    0700 Report to Veronica WESTBROOK, POC discussed.

## 2018-06-30 NOTE — PROGRESS NOTES
"Pharmacy Kinetics 48 y.o. female on vancomycin day # 1 (Restarted) 2018    Currently on Vancomycin Re-load with 2800mg X 1    Indication for Treatment: Left foot osteomyelitis     Pertinent history per medical record: Admitted on 2018 for worsening foot infection.  Post amputation of left great toe in 2017.  Xray and MRI revealed 1st and 2nd MTH osteomyelitis of the left foot.  S/P left 1st ray amputation, I&D yesterday. Dr Nelson from Infectious disease has been consulted..     Other antibiotics: Zosyn 4.5 gm every 8 hours as extended infusion     Allergies: Patient has no known allergies.      List concerns for renal function: Elevated BMI of 39.5; nephrotoxic drugs (Vancomycin and Zosyn combination)     Pertinent cultures to date:    L foot WND grm(+) cocci - preliminary       Recent Labs      18   0437   WBC  12.5*   NEUTSPOLYS  66.50     Recent Labs      18   0937  18   0437   BUN  27*  24*   CREATININE  1.24  1.10   ALBUMIN   --   3.2     Recent Labs      18   1446   VANCOTROUGH  17.4     Intake/Output Summary (Last 24 hours) at 18 1416  Last data filed at 18 0600   Gross per 24 hour   Intake              900 ml   Output                0 ml   Net              900 ml      Blood pressure 150/90, pulse 74, temperature 37.1 °C (98.8 °F), resp. rate 18, height 1.676 m (5' 6\"), weight 111.2 kg (245 lb 2.4 oz), SpO2 91 %, not currently breastfeeding. Temp (24hrs), Av.8 °C (98.2 °F), Min:36.7 °C (98 °F), Max:37.1 °C (98.8 °F)      A/P   1. Vancomycin dose change: 1600 mg q 12h  2. Next vancomycin level:  13:30  3. Goal trough: 15  4. Comments: Will continue to monitor and adjust dose as needed per protocol.    Evette Villagran    "

## 2018-06-30 NOTE — THERAPY
"Occupational Therapy Evaluation completed.   Functional Status:  NWB LLE. Boot, wound vac in place. Max encouragement for participation. Performs sup to sit, STS with SBA. Dons underwear with SBA. ADL transfers with SBA, FWW. Tolerates ~4\" standing, 20' walking with SBA/CGA, FWW.    Plan of Care: Will benefit from Occupational Therapy 2 times per week  Discharge Recommendations:  Equipment: Will Continue to Assess for Equipment Needs.  Discharge to a transitional care facility for continued skilled therapy services.  See \"Rehab Therapy-Acute\" Patient Summary Report for complete documentation.    "

## 2018-07-01 ENCOUNTER — APPOINTMENT (OUTPATIENT)
Dept: RADIOLOGY | Facility: MEDICAL CENTER | Age: 48
DRG: 854 | End: 2018-07-01
Attending: HOSPITALIST
Payer: MEDICAID

## 2018-07-01 ENCOUNTER — APPOINTMENT (OUTPATIENT)
Dept: RADIOLOGY | Facility: MEDICAL CENTER | Age: 48
DRG: 854 | End: 2018-07-01
Attending: INTERNAL MEDICINE
Payer: MEDICAID

## 2018-07-01 LAB
BACTERIA BLD CULT: NORMAL
BACTERIA BLD CULT: NORMAL
GLUCOSE BLD-MCNC: 160 MG/DL (ref 65–99)
GLUCOSE BLD-MCNC: 173 MG/DL (ref 65–99)
GLUCOSE BLD-MCNC: 201 MG/DL (ref 65–99)
GLUCOSE BLD-MCNC: 308 MG/DL (ref 65–99)
SIGNIFICANT IND 70042: NORMAL
SIGNIFICANT IND 70042: NORMAL
SITE SITE: NORMAL
SITE SITE: NORMAL
SOURCE SOURCE: NORMAL
SOURCE SOURCE: NORMAL
VANCOMYCIN TROUGH SERPL-MCNC: 19.4 UG/ML (ref 10–20)

## 2018-07-01 PROCEDURE — 700111 HCHG RX REV CODE 636 W/ 250 OVERRIDE (IP): Performed by: HOSPITALIST

## 2018-07-01 PROCEDURE — 700102 HCHG RX REV CODE 250 W/ 637 OVERRIDE(OP): Performed by: INTERNAL MEDICINE

## 2018-07-01 PROCEDURE — A9270 NON-COVERED ITEM OR SERVICE: HCPCS | Performed by: HOSPITALIST

## 2018-07-01 PROCEDURE — 99232 SBSQ HOSP IP/OBS MODERATE 35: CPT | Performed by: HOSPITALIST

## 2018-07-01 PROCEDURE — A9270 NON-COVERED ITEM OR SERVICE: HCPCS | Performed by: INTERNAL MEDICINE

## 2018-07-01 PROCEDURE — 700102 HCHG RX REV CODE 250 W/ 637 OVERRIDE(OP): Performed by: HOSPITALIST

## 2018-07-01 PROCEDURE — 770006 HCHG ROOM/CARE - MED/SURG/GYN SEMI*

## 2018-07-01 PROCEDURE — B54MZZA ULTRASONOGRAPHY OF RIGHT UPPER EXTREMITY VEINS, GUIDANCE: ICD-10-PCS | Performed by: HOSPITALIST

## 2018-07-01 PROCEDURE — 36569 INSJ PICC 5 YR+ W/O IMAGING: CPT

## 2018-07-01 PROCEDURE — 700105 HCHG RX REV CODE 258: Performed by: HOSPITALIST

## 2018-07-01 PROCEDURE — 82962 GLUCOSE BLOOD TEST: CPT | Mod: 91

## 2018-07-01 PROCEDURE — 99232 SBSQ HOSP IP/OBS MODERATE 35: CPT | Performed by: INTERNAL MEDICINE

## 2018-07-01 PROCEDURE — 700111 HCHG RX REV CODE 636 W/ 250 OVERRIDE (IP): Performed by: INTERNAL MEDICINE

## 2018-07-01 PROCEDURE — 80202 ASSAY OF VANCOMYCIN: CPT

## 2018-07-01 PROCEDURE — 05HY33Z INSERTION OF INFUSION DEVICE INTO UPPER VEIN, PERCUTANEOUS APPROACH: ICD-10-PCS | Performed by: HOSPITALIST

## 2018-07-01 RX ORDER — INSULIN GLARGINE 100 [IU]/ML
25 INJECTION, SOLUTION SUBCUTANEOUS NIGHTLY
Status: DISCONTINUED | OUTPATIENT
Start: 2018-07-01 | End: 2018-07-02

## 2018-07-01 RX ORDER — IBUPROFEN 200 MG
400 TABLET ORAL EVERY 6 HOURS PRN
Status: DISCONTINUED | OUTPATIENT
Start: 2018-07-01 | End: 2018-07-02

## 2018-07-01 RX ADMIN — LISINOPRIL 10 MG: 5 TABLET ORAL at 10:18

## 2018-07-01 RX ADMIN — IBUPROFEN 400 MG: 200 TABLET, FILM COATED ORAL at 23:09

## 2018-07-01 RX ADMIN — VANCOMYCIN HYDROCHLORIDE 1600 MG: 5 INJECTION, POWDER, LYOPHILIZED, FOR SOLUTION INTRAVENOUS at 01:01

## 2018-07-01 RX ADMIN — NICOTINE 21 MG: 21 PATCH, EXTENDED RELEASE TRANSDERMAL at 05:56

## 2018-07-01 RX ADMIN — INSULIN LISPRO 3 UNITS: 100 INJECTION, SOLUTION INTRAVENOUS; SUBCUTANEOUS at 17:26

## 2018-07-01 RX ADMIN — INSULIN LISPRO 10 UNITS: 100 INJECTION, SOLUTION INTRAVENOUS; SUBCUTANEOUS at 21:25

## 2018-07-01 RX ADMIN — OXYCODONE HYDROCHLORIDE 5 MG: 5 TABLET ORAL at 06:12

## 2018-07-01 RX ADMIN — NICOTINE POLACRILEX 2 MG: 2 GUM, CHEWING ORAL at 01:09

## 2018-07-01 RX ADMIN — INSULIN LISPRO 4 UNITS: 100 INJECTION, SOLUTION INTRAVENOUS; SUBCUTANEOUS at 12:38

## 2018-07-01 RX ADMIN — HALOPERIDOL LACTATE 5 MG: 5 INJECTION, SOLUTION INTRAMUSCULAR at 17:47

## 2018-07-01 RX ADMIN — SIMVASTATIN 20 MG: 20 TABLET, FILM COATED ORAL at 21:35

## 2018-07-01 RX ADMIN — NICOTINE POLACRILEX 2 MG: 2 GUM, CHEWING ORAL at 15:34

## 2018-07-01 RX ADMIN — INSULIN LISPRO 7 UNITS: 100 INJECTION, SOLUTION INTRAVENOUS; SUBCUTANEOUS at 06:07

## 2018-07-01 RX ADMIN — IBUPROFEN 400 MG: 200 TABLET, FILM COATED ORAL at 10:32

## 2018-07-01 RX ADMIN — INSULIN GLARGINE 25 UNITS: 100 INJECTION, SOLUTION SUBCUTANEOUS at 21:25

## 2018-07-01 RX ADMIN — IBUPROFEN 400 MG: 200 TABLET, FILM COATED ORAL at 17:09

## 2018-07-01 RX ADMIN — OXYCODONE HYDROCHLORIDE 5 MG: 5 TABLET ORAL at 01:09

## 2018-07-01 RX ADMIN — NICOTINE POLACRILEX 2 MG: 2 GUM, CHEWING ORAL at 21:32

## 2018-07-01 RX ADMIN — INSULIN LISPRO 7 UNITS: 100 INJECTION, SOLUTION INTRAVENOUS; SUBCUTANEOUS at 12:42

## 2018-07-01 RX ADMIN — NICOTINE POLACRILEX 2 MG: 2 GUM, CHEWING ORAL at 10:59

## 2018-07-01 RX ADMIN — MULTIPLE VITAMINS W/ MINERALS TAB 2 TABLET: TAB at 10:19

## 2018-07-01 RX ADMIN — SENNOSIDES AND DOCUSATE SODIUM 1 TABLET: 8.6; 5 TABLET ORAL at 21:35

## 2018-07-01 RX ADMIN — NICOTINE POLACRILEX 2 MG: 2 GUM, CHEWING ORAL at 04:24

## 2018-07-01 RX ADMIN — LITHIUM CARBONATE 150 MG: 150 CAPSULE, GELATIN COATED ORAL at 10:19

## 2018-07-01 RX ADMIN — VANCOMYCIN HYDROCHLORIDE 1300 MG: 5 INJECTION, POWDER, LYOPHILIZED, FOR SOLUTION INTRAVENOUS at 17:15

## 2018-07-01 RX ADMIN — INSULIN LISPRO 3 UNITS: 100 INJECTION, SOLUTION INTRAVENOUS; SUBCUTANEOUS at 06:03

## 2018-07-01 RX ADMIN — INSULIN LISPRO 7 UNITS: 100 INJECTION, SOLUTION INTRAVENOUS; SUBCUTANEOUS at 17:22

## 2018-07-01 ASSESSMENT — ENCOUNTER SYMPTOMS
TINGLING: 0
NAUSEA: 0
DIZZINESS: 0
ABDOMINAL PAIN: 0
BLURRED VISION: 0
NECK PAIN: 0
BACK PAIN: 0
SORE THROAT: 0
FEVER: 0
HALLUCINATIONS: 0
COUGH: 0
SHORTNESS OF BREATH: 0
EYE PAIN: 0
INSOMNIA: 1
NERVOUS/ANXIOUS: 1
PALPITATIONS: 0
CHILLS: 0

## 2018-07-01 ASSESSMENT — PAIN SCALES - GENERAL
PAINLEVEL_OUTOF10: 3
PAINLEVEL_OUTOF10: 6
PAINLEVEL_OUTOF10: 7
PAINLEVEL_OUTOF10: 5
PAINLEVEL_OUTOF10: 5
PAINLEVEL_OUTOF10: 6

## 2018-07-01 NOTE — CARE PLAN
Problem: Safety  Goal: Will remain free from injury  Pt encouraged to call for assistance, bed in low position, call light within reach. Treaded slipper socks on pt, mobility assessed (SBA) & appropriate sign placed.    Problem: Knowledge Deficit  Goal: Knowledge of disease process/condition, treatment plan, diagnostic tests, and medications will improve  POC discussed with pt-medications, lab (FSBS, vanco trough), disease process, PICC placement-verbalized understanding.

## 2018-07-01 NOTE — PROGRESS NOTES
Alert and oriented  to person,place and time.Trace of  Edema LLE,  L foot splint and wound vac in place, on room air sat 93.Complained of LLE pain 6/10 at this time.   Medication given per MAR, refused Seroquel . States understanding of POC. Call light at reach.

## 2018-07-01 NOTE — PROGRESS NOTES
Provider order and indication(s) for peripherally inserted central catheter confirmed. Labs reviewed. Nursing care plan includes knowledge deficit, potential for pain and anxiety, potential for infection. Risks and benefits of procedure explained to patient/family emphasizing risk of central bloodstream infection. POC: patient teaching, comfort measures, and sterile technique using five step bundle to prevent CR-BSI. Questions answered. Patient/Family verbalized understanding. Consent obtained/confirmed.    Vessel patency confirmed with ultrasound. Upper arm circumference 9 cm above antecubital on PICC arm is 34 cm.      Sterile procedure followed and patient draped per protocol. 2 cc of 1% lidocaine injected intradermally to insertion site at RUE. 21 gauge Micro-introducer needle used to access Basilic vein. Modified Seldinger technique used to insert 4 Fr single lumen 51 cm catheter with blood return noted through each lumen. Each lumen flushed using pulsatile method without resistance with 10 cc normal saline. 3CG/EKG technology used with appropriate waveform printed and placed on chart.  PICC secured with Statlock at 4 cm hilda. Biopatch and Tegaderm applied over insertion site.     # of attempts  one    [employer] Power PICC REF# [id number] LOT# [id number]    Time out and LDA documentation completed. Patient condition and procedure outcome reported to unit RN and /or ordering provider via this post-procedure note.    Patient/family educated re: PICC care. Post-procedure instructions given to patient.

## 2018-07-01 NOTE — PROGRESS NOTES
"Met with patient to discuss displeasure with hospital stay. Patient didn't make eye contact with me until I asked her to engage. She wants pain medication and in the same breath states she is not asking for pain medication. She admits to refusing labs to be drawn. She states she is not rude to staff. Jen RN, walked in at this time and the patient spoke very snippety to her. \"Oh, you can come in here now after two hours,\" she stated. When I was in the room she had several visits from the CNA to check on her as well. We didn't not come to any conclusion as to how to make her participate in her care. She will also not reason with me about lab draws. Her main focus in our meeting is pain medication. Dr Grant Montgomery has increased her Motrin to 800 mg from 400 mg. Will follow for support as needed. Patient remains uncooperative with plan of care and declines further education regarding interventions.   "

## 2018-07-01 NOTE — PROGRESS NOTES
"Pharmacy Kinetics 48 y.o. female on vancomycin day # 2 2018    Currently on Vancomycin 1600 mg iv q12hr    Indication for Treatment: Left foot osteomyelitis     Pertinent history per medical record: Admitted on 2018 for worsening foot infection.  Post amputation of left great toe in 2017.  Xray and MRI revealed 1st and 2nd MTH osteomyelitis of the left foot.  S/P left 1st ray amputation, I&D yesterday. Dr Nelson from Infectious disease has been consulted..     Other antibiotics: Zosyn 4.5 gm every 8 hours as extended infusion     Allergies: Patient has no known allergies.      List concerns for renal function: Elevated BMI of 39.5; nephrotoxic drugs (Vancomycin and Zosyn combination)     Pertinent cultures to date:    L foot WND grm(+) cocci - preliminary       Recent Labs      18   0437   WBC  12.5*   NEUTSPOLYS  66.50     Recent Labs      18   0437   BUN  24*   CREATININE  1.10   ALBUMIN  3.2     Recent Labs      18   1530   VANCOTROUGH  19.4     Intake/Output Summary (Last 24 hours) at 18 1611  Last data filed at 18 1800   Gross per 24 hour   Intake              150 ml   Output                0 ml   Net              150 ml      Blood pressure 132/98, pulse 78, temperature 36.9 °C (98.5 °F), resp. rate 18, height 1.676 m (5' 6\"), weight 119.7 kg (263 lb 14.3 oz), SpO2 95 %, not currently breastfeeding. Temp (24hrs), Av.9 °C (98.5 °F), Min:36.6 °C (97.9 °F), Max:37.1 °C (98.8 °F)      1. A/P Vancomycin dose change: Decrease dose to 1300 mg q 12h  2. Next vancomycin level: 7/3 04:30  3. Goal trough: 15  4. Comments: Will continue to monitor and adjust dose as needed per protocol.    Evette Villagran    "

## 2018-07-01 NOTE — PROGRESS NOTES
Infectious Disease Progress Note    Author: Kacey Goel M.D. Date & Time of service: 2018  1:34 PM    Chief Complaint:  FU left foot osteomyelitis    Interval History:   AF no CBC s/p left 1st ray amputation, I&D yesterday, afraid to look at surgical site, tolerating abx without issues   AF WBC 12.5 pt frustrated bc she did not sleep well overnight due to noise from IV box, emotionally labile and nearly in tears, OR cx NGTD   AF lots of complaints-mostly about blood draws and IV access   AF refusal of labs this am noted-plan for PICC  Labs Reviewed, Medications Reviewed, Radiology Reviewed and Wound Reviewed.    Review of Systems:  Review of Systems   Unable to perform ROS: Other   Constitutional: Negative for fever.   sleeping    Hemodynamics:  Temp (24hrs), Av.8 °C (98.3 °F), Min:36.6 °C (97.9 °F), Max:37.1 °C (98.8 °F)  Temperature: 36.6 °C (97.9 °F)  Pulse  Av.2  Min: 64  Max: 125   Blood Pressure: 136/74       Physical Exam:  Physical Exam   Constitutional: No distress.   Obese   HENT:   Head: Atraumatic.   Cardiovascular: Normal rate.    Pulmonary/Chest: Effort normal.   Abdominal: Soft.   Musculoskeletal: She exhibits edema.   Wound VAC LLE   Neurological:   sleeping   Skin: No rash noted.   Psychiatric:            Nursing note and vitals reviewed.      Meds:    Current Facility-Administered Medications:   •  insulin glargine  •  ibuprofen  •  MD ALERT... vancomycin  •  vancomycin  •  QUEtiapine  •  [DISCONTINUED] insulin glargine **AND** insulin lispro **AND** insulin lispro **AND** Accu-Chek ACHS **AND** NOTIFY MD and PharmD **AND** glucose 4 g **AND** dextrose 50%  •  traZODone  •  lisinopril  •  simvastatin  •  therapeutic multivitamin-minerals  •  senna-docusate **AND** polyethylene glycol/lytes **AND** magnesium hydroxide **AND** bisacodyl  •  haloperidol lactate  •  temazepam  •  nicotine **AND** Nicotine Replacement Patient Education Materials **AND** nicotine  polacrilex  •  [DISCONTINUED] insulin regular **AND** Accu-Chek ACHS **AND** NOTIFY MD and PharmD **AND** glucose 4 g **AND** dextrose 50%    Labs:  Recent Labs      06/29/18 0437   WBC  12.5*   RBC  3.53*   HEMOGLOBIN  11.6*   HEMATOCRIT  34.0*   MCV  96.3   MCH  32.9   RDW  50.3*   PLATELETCT  222   MPV  9.5   NEUTSPOLYS  66.50   LYMPHOCYTES  24.50   MONOCYTES  5.80   EOSINOPHILS  2.20   BASOPHILS  0.40     Recent Labs      06/29/18 0437   SODIUM  132*   POTASSIUM  4.2   CHLORIDE  103   CO2  23   GLUCOSE  179*   BUN  24*     Recent Labs      06/29/18 0437   ALBUMIN  3.2   TBILIRUBIN  0.8   ALKPHOSPHAT  49   TOTPROTEIN  5.8*   ALTSGPT  26   ASTSGOT  23   CREATININE  1.10       Imaging:  Dx-chest-limited (1 View)    Result Date: 6/26/2018 6/26/2018 11:04 AM HISTORY/REASON FOR EXAM: Diabetes, great toe nonhealing wound. Amputation 7 months ago. TECHNIQUE/EXAM DESCRIPTION AND NUMBER OF VIEWS: Single portable view of the chest. COMPARISON: None. FINDINGS: Rotated to the right Cardiomediastinal contours are normal. Lungs demonstrate no consolidation. No pneumothorax is seen. Probable left os acromiale     No radiographic evidence of acute cardiopulmonary process.    Dx-foot-complete 3+ Left  Result Date: 6/26/2018 6/26/2018 11:11 AM HISTORY/REASON FOR EXAM:  Great toe nonhealing wound. TECHNIQUE/EXAM DESCRIPTION AND NUMBER OF VIEWS: 3 nonweightbearing views of the LEFT foot. COMPARISON:  None. FINDINGS: There has been a great toe amputation. The cutaneous stump margin is irregular with gas density extending over the metatarsal head. Half of the medial aspect of the subarticular metatarsal head is irregular and absent with a moderately appearance. No other bony destruction is seen although the medial first metatarsal head sesamoid is not seen which could be surgically absent or completely eroded No acute displaced fracture is noted. There is no subluxation.   Status post great toe amputation with metatarsal head  erosive changes compatible with osteomyelitis Medial forefoot skin ulceration    Mr-foot-w/o Left  Result Date: 6/26/2018  1.  Severely degraded by motion artifact 2.  First metatarsal osteomyelitis spares the proximal third 3.  Second metatarsal head osteomyelitis is favored over abnormal stress response/fracture 4.  Plantar third metatarsal head osteomyelitis or reactive change is possible. Unfortunately the severe motion artifact prevents more precise characterization 5.  Great toe amputation with large cutaneous defect and adjacent cellulitis at the operative bed      Micro:  Results     Procedure Component Value Units Date/Time    CULTURE TISSUE W/ GRM STAIN [[id number]]  (Abnormal) Collected:  06/27/18 1700    Order Status:  Completed Specimen:  Tissue Updated:  06/30/18 1403     Significant Indicator POS (POS)     Source TISS     Site Left Foot Cultures     Tissue Culture Growth noted after further incubation, see below for  organism identification.   (A)     Gram Stain Result Rare WBCs.  No organisms seen.       Tissue Culture Diphtheroids  Rare growth   (A)    ANAEROBIC CULTURE [[id number]] Collected:  06/27/18 1700    Order Status:  Completed Specimen:  Tissue Updated:  06/30/18 1403     Significant Indicator NEG     Source TISS     Site Left Foot Cultures     Anaerobic Culture, Culture Res No Anaerobes isolated.    ANAEROBIC CULTURE [[id number]] Collected:  06/27/18 1644    Order Status:  Completed Specimen:  Wound Updated:  06/30/18 1402     Significant Indicator NEG     Source WND     Site Left Foot Bone     Anaerobic Culture, Culture Res No Anaerobes isolated.    Narrative:       a - left foot bone and tissue  b - left second metacarpal head  c 1-2 - left foot cultures    CULTURE TISSUE W/ GRM STAIN [[id number]]  (Abnormal) Collected:  06/27/18 1644    Order Status:  Completed Specimen:  Wound Updated:  06/30/18 1402     Significant Indicator POS (POS)     Source WND     Site Left Foot Bone     Tissue  "Culture Growth noted after further incubation, see below for  organism identification.   (A)     Gram Stain Result No organisms seen.     Tissue Culture Diphtheroids  Light growth   (A)    Narrative:       a - left foot bone and tissue  b - left second metacarpal head  c 1-2 - left foot cultures    BLOOD CULTURE [[id number]] Collected:  06/26/18 1220    Order Status:  Completed Specimen:  Blood from Peripheral Updated:  06/30/18 0831     Significant Indicator NEG     Source BLD     Site Peripheral     Blood Culture No Growth    Note: Blood cultures are incubated for 5 days and  are monitored continuously.Positive blood cultures  are called to the RN and reported as soon as  they are identified.     Blood culture testing and Gram stain, if indicated, are  performed at [facility], [address] [place] [address].  Positive blood cultures are  sent to [facility], [address] [place] [address], for organism identification and  susceptibility testing.      Narrative:       Per Hospital Policy: Only change Specimen Src: to \"Line\" if  specified by physician order.    BLOOD CULTURE [[id number]] Collected:  06/26/18 1300    Order Status:  Completed Specimen:  Blood from Peripheral Updated:  06/30/18 0831     Significant Indicator NEG     Source BLD     Site Peripheral     Blood Culture No Growth    Note: Blood cultures are incubated for 5 days and  are monitored continuously.Positive blood cultures  are called to the RN and reported as soon as  they are identified.     Blood culture testing and Gram stain, if indicated, are  performed at [facility], [facility]  [facility].[place], [place].  Positive blood cultures are  sent to [facility], [address] [place] [address], for organism identification and  susceptibility testing.      Narrative:       Per Hospital Policy: Only change Specimen Src: to \"Line\" if  specified by physician " order.    CULTURE WOUND W/ GRAM STAIN [[id number]] Collected:  06/26/18 1421    Order Status:  Completed Specimen:  Wound from Left Foot Updated:  06/28/18 0958     Gram Stain Result Moderate WBCs.  Rare Gram positive cocci.       Significant Indicator NEG     Source WND     Site LEFT FOOT     Culture Result Wound Moderate growth mixed skin kelley.    CULTURE WOUND W/ GRAM STAIN [[id number]] Collected:  06/26/18 1113    Order Status:  Completed Specimen:  Wound from Left Foot Updated:  06/28/18 0947     Gram Stain Result Many WBCs.  Moderate Gram positive cocci.  Moderate Gram positive rods.       Significant Indicator NEG     Source WND     Site LEFT FOOT     Culture Result Wound Moderate growth mixed skin kelley.    GRAM STAIN [[id number]] Collected:  06/27/18 1644    Order Status:  Completed Specimen:  Wound Updated:  06/27/18 2307     Significant Indicator .     Source WND     Site Left Foot Bone     Gram Stain Result No organisms seen.    Narrative:       a - left foot bone and tissue  b - left second metacarpal head  c 1-2 - left foot cultures    GRAM STAIN [[id number]] Collected:  06/27/18 1700    Order Status:  Completed Specimen:  Tissue Updated:  06/27/18 2307     Significant Indicator .     Source TISS     Site Left Foot Cultures     Gram Stain Result Rare WBCs.  No organisms seen.      CULTURE WOUND W/ GRAM STAIN [[id number]] Collected:  06/27/18 1644    Order Status:  Canceled Specimen:  Other Updated:  06/27/18 1803    GRAM STAIN [[id number]] Collected:  06/26/18 1421    Order Status:  Completed Specimen:  Wound Updated:  06/27/18 0754     Significant Indicator .     Source WND     Site LEFT FOOT     Gram Stain Result Moderate WBCs.  Rare Gram positive cocci.      GRAM STAIN [[id number]] Collected:  06/26/18 1113    Order Status:  Completed Specimen:  Wound Updated:  06/27/18 0754     Significant Indicator .     Source WND     Site LEFT FOOT     Gram Stain Result Many WBCs.  Moderate Gram positive  cocci.  Moderate Gram positive rods.            Assessment:  Active Hospital Problems    Diagnosis   • Diabetic neuropathy ([facility]) [E11.40]   • Osteomyelitis ([facility]) [M86.9]   • Bipolar I disorder with gwen ([facility]) [F31.10]   • Morbid obesity ([facility]) [E66.01]   • Type 2 diabetes mellitus with neurologic complication ([facility]) [E11.49]   • Diabetic foot infection ([facility]) [E11.628, L08.9]   • Methadone dependence ([facility]) [F11.20]   • Sepsis ([facility]) [A41.9]   • Bed bug bite [W57.XXXA]       Plan:  Left foot osteomyelitis  Afebrile  Leukocytosis  S/p left great toe amp in Nov 2017 by Dr. Merlos  MRI +OM digits 1-3  S/p I and D debridement, left diabetic foot ulcer, left first ray amputation, left gastrocnemius recession and arthrotomy, second metatarsophalangeal joint on 6/27  OR bone cultures - diptheroids  Path - pending  PICC  Continue vancomycin due to diptheroids-monitor renal function  Anticipate 6 weeks abx-from date of surgery  Stop date 8/8/2018    Leukocytosis  2/2 above  On abx above  Monitor    Right great toe diabetic ulcer  Not actively infected  Wound care    DM2  HgA1c 7.7  Maintain BS less than 150 to control infection and wound healing    Bipolar disorder  Homelessness    Will need placement-noncompliance  Discussed with internal medicine/Dr Montgomery.

## 2018-07-01 NOTE — PROGRESS NOTES
"0700 Report received from [facility] nurse, Saurabh, at bedside. Pt is resting in bed.    1035 Motrin given for left foot pain 5/10.    1220 Pt is back from radiology dep. PICC (in left arm) placed.    1236 FSBS 201    1500 Pt verbalized understanding of NWB status of left leg, still offered pt for assistance, yelling at staff to \"step back, do not touch me, I don't want anybody around me\".    1721 FSBS 160    1750 Pt was asking for antianxiety med-offered Haldol, pt wanted to have more info about Haldol and agreed to have Haldol.    1900 Report given to [facility] nurse, Johnny, at bedside.  "

## 2018-07-01 NOTE — PROGRESS NOTES
[facility] Hospitalist Progress Note    Date of Service: 7/1/2018    Chief Complaint  48 y.o. female admitted 6/26/2018 with diabetic foot ulceration osteomyelitis and associated sepsis.     Interval Problem Update  diptheroids in cultures  Still refusing seroquel  Refusing lab draws  lantus increased today  Extremely belligerent today. will not look at providers to speak with them. Asking for opiate pain meds for lab draws.     Consultants/Specialty  ortho  ID    Disposition  Hospital.     MRI foot:  1.  Severely degraded by motion artifact  2.  First metatarsal osteomyelitis spares the proximal third  3.  Second metatarsal head osteomyelitis is favored over abnormal stress response/fracture  4.  Plantar third metatarsal head osteomyelitis or reactive change is possible. Unfortunately the severe motion artifact prevents more precise characterization  5.  Great toe amputation with large cutaneous defect and adjacent cellulitis at the operative bed    OPERATION PERFORMED:  1.  Irrigation with excisional debridement, left diabetic foot ulcer.  2.  Left first ray amputation.  3.  Left gastrocnemius recession.  4.  Arthrotomy, second metatarsophalangeal joint.  5.  Placement of a negative pressure dressing of less than 50 square   Centimeters.        Review of Systems   Constitutional: Negative for chills and fever.   HENT: Negative for sore throat.    Eyes: Negative for blurred vision and pain.   Respiratory: Negative for cough and shortness of breath.    Cardiovascular: Negative for chest pain and palpitations.   Gastrointestinal: Negative for abdominal pain and nausea.   Genitourinary: Negative for dysuria and urgency.   Musculoskeletal: Negative for back pain and neck pain.   Skin: Negative for itching and rash.   Neurological: Negative for dizziness and tingling.   Psychiatric/Behavioral: Negative for hallucinations. The patient is nervous/anxious and has insomnia.    All other systems reviewed and are negative.      Physical Exam  Laboratory/Imaging   Hemodynamics  Temp (24hrs), Av.9 °C (98.4 °F), Min:36.6 °C (97.9 °F), Max:37.1 °C (98.8 °F)   Temperature: 36.6 °C (97.9 °F)  Pulse  Av.2  Min: 64  Max: 125    Blood Pressure: 136/74      Respiratory      Respiration: 18, Pulse Oximetry: 98 %     Work Of Breathing / Effort: Mild  RUL Breath Sounds: Diminished, RML Breath Sounds: Diminished, RLL Breath Sounds: Diminished, SAUL Breath Sounds: Diminished, LLL Breath Sounds: Diminished    Fluids    Intake/Output Summary (Last 24 hours) at 18 0820  Last data filed at 18 1800   Gross per 24 hour   Intake              600 ml   Output                0 ml   Net              600 ml       Nutrition  Orders Placed This Encounter   Procedures   • Diet Order Diabetic     Standing Status:   Standing     Number of Occurrences:   1     Order Specific Question:   Diet:     Answer:   Diabetic [3]     Physical Exam   Constitutional: She is oriented to person, place, and time. She appears well-developed and well-nourished. No distress.   Patient seen and examined  Discussed plan with RN   SHARONT:   Right Ear: External ear normal.   Left Ear: External ear normal.   Mouth/Throat: Oropharynx is clear and moist.   Eyes: Conjunctivae are normal. Right eye exhibits no discharge. Left eye exhibits no discharge.   Neck: No JVD present.   Cardiovascular: Regular rhythm and normal heart sounds.    No murmur heard.       Pulmonary/Chest: Effort normal and breath sounds normal. No stridor. No respiratory distress. She has no rales.   Abdominal: Soft. Bowel sounds are normal. She exhibits no distension. There is no tenderness.   Musculoskeletal: She exhibits no edema or tenderness.   Left foot dressed.  Small ulceration right great toe on base about 4mm in size.    Neurological: She is alert and oriented to person, place, and time.   Skin: Skin is warm and dry. She is not diaphoretic. No erythema.   Psychiatric: Her mood appears anxious. Her  affect is labile and inappropriate. She is agitated and hyperactive. She expresses impulsivity.   Rude and disrespectful behavior   Nursing note and vitals reviewed.      Recent Labs      06/29/18   0437   WBC  12.5*   RBC  3.53*   HEMOGLOBIN  11.6*   HEMATOCRIT  34.0*   MCV  96.3   MCH  32.9   MCHC  34.1   RDW  50.3*   PLATELETCT  222   MPV  9.5     Recent Labs      06/28/18   0937  06/29/18   0437   SODIUM  132*  132*   POTASSIUM  4.3  4.2   CHLORIDE  103  103   CO2  24  23   GLUCOSE  226*  179*   BUN  27*  24*   CREATININE  1.24  1.10   CALCIUM  8.0*  7.8*                      Assessment/Plan     Bipolar I disorder with gwen ([facility])   Assessment & Plan    manic behavior and manipulative behavior worse- highly labile  Refuses seroquel   Stop lithium not helping  Prn ativan stopped- no further controlled substances appropriate.   Motrin for pain.         Osteomyelitis ([facility])   Assessment & Plan    S/p surgery  Iv abx per ID          Diabetic neuropathy ([facility])   Assessment & Plan    Consider neurontin but says she gets swelling with this.         Bed bug bite   Assessment & Plan    Patient has numerous lesions on her lower extremity consistent with bedbug bites  No bugs noted here.   She is homeless        Sepsis ([facility])   Assessment & Plan    This is sepsis (without associated acute organ dysfunction).   2/2 DM foot infection with osteomyelitis  Blood, wound cultures neg to date  Iv abx per ID  Iv fluids om to stop now          Methadone dependence ([facility])   Assessment & Plan    Patient denies use of narcotics or methadone tox screen is positive  NO opiates should be used here.   Stop opiates        Diabetic foot infection ([facility])   Assessment & Plan    history of amputation of left great toe last fall  this site appears to be adjacent to her surgical site  With associated osteomyelitis.   S/p i/d, 1st ray amputation wound vac  Iv abx per ID        Type 2 diabetes mellitus with neurologic complication ([facility])   Assessment &  Plan    Complicated by neuropathy, hyperglycemia, vascular, infectious and renal complications.   Continue Basal and lantus- titrating to control  increase lantus again today  SSI/preprandial insulin  DM diet  A1C 7.7        Morbid obesity ([facility])   Assessment & Plan    BMI 39.57 complicated by diabetes          Quality-Core Measures   Reviewed items::  EKG reviewed, Labs reviewed, Medications reviewed and Radiology images reviewed  Hernandez catheter::  No Hernandez  DVT prophylaxis pharmacological::  Heparin  DVT prophylaxis - mechanical:  SCDs  Ulcer Prophylaxis::  Yes  Antibiotics:  Treating active infection/contamination beyond 24 hours perioperative coverage  Assessed for rehabilitation services:  Patient was assess for and/or received rehabilitation services during this hospitalization

## 2018-07-02 LAB
ALBUMIN SERPL BCP-MCNC: 3.4 G/DL (ref 3.2–4.9)
ALBUMIN/GLOB SERPL: 1.2 G/DL
ALP SERPL-CCNC: 47 U/L (ref 30–99)
ALT SERPL-CCNC: 22 U/L (ref 2–50)
ANION GAP SERPL CALC-SCNC: 6 MMOL/L (ref 0–11.9)
AST SERPL-CCNC: 21 U/L (ref 12–45)
BASOPHILS # BLD AUTO: 0.5 % (ref 0–1.8)
BASOPHILS # BLD: 0.06 K/UL (ref 0–0.12)
BILIRUB SERPL-MCNC: 0.6 MG/DL (ref 0.1–1.5)
BUN SERPL-MCNC: 20 MG/DL (ref 8–22)
CALCIUM SERPL-MCNC: 8.5 MG/DL (ref 8.4–10.2)
CHLORIDE SERPL-SCNC: 104 MMOL/L (ref 96–112)
CO2 SERPL-SCNC: 24 MMOL/L (ref 20–33)
CREAT SERPL-MCNC: 0.84 MG/DL (ref 0.5–1.4)
EOSINOPHIL # BLD AUTO: 0.35 K/UL (ref 0–0.51)
EOSINOPHIL NFR BLD: 2.6 % (ref 0–6.9)
ERYTHROCYTE [DISTWIDTH] IN BLOOD BY AUTOMATED COUNT: 47.3 FL (ref 35.9–50)
GLOBULIN SER CALC-MCNC: 2.9 G/DL (ref 1.9–3.5)
GLUCOSE BLD-MCNC: 189 MG/DL (ref 65–99)
GLUCOSE BLD-MCNC: 200 MG/DL (ref 65–99)
GLUCOSE BLD-MCNC: 209 MG/DL (ref 65–99)
GLUCOSE SERPL-MCNC: 181 MG/DL (ref 65–99)
HCT VFR BLD AUTO: 34.9 % (ref 37–47)
HGB BLD-MCNC: 11.9 G/DL (ref 12–16)
IMM GRANULOCYTES # BLD AUTO: 0.12 K/UL (ref 0–0.11)
IMM GRANULOCYTES NFR BLD AUTO: 0.9 % (ref 0–0.9)
LYMPHOCYTES # BLD AUTO: 2.34 K/UL (ref 1–4.8)
LYMPHOCYTES NFR BLD: 17.6 % (ref 22–41)
MCH RBC QN AUTO: 32.2 PG (ref 27–33)
MCHC RBC AUTO-ENTMCNC: 34.1 G/DL (ref 33.6–35)
MCV RBC AUTO: 94.3 FL (ref 81.4–97.8)
MONOCYTES # BLD AUTO: 0.95 K/UL (ref 0–0.85)
MONOCYTES NFR BLD AUTO: 7.2 % (ref 0–13.4)
NEUTROPHILS # BLD AUTO: 9.44 K/UL (ref 2–7.15)
NEUTROPHILS NFR BLD: 71.2 % (ref 44–72)
NRBC # BLD AUTO: 0 K/UL
NRBC BLD-RTO: 0 /100 WBC
PLATELET # BLD AUTO: 211 K/UL (ref 164–446)
PMV BLD AUTO: 9.7 FL (ref 9–12.9)
POTASSIUM SERPL-SCNC: 4.8 MMOL/L (ref 3.6–5.5)
PROT SERPL-MCNC: 6.3 G/DL (ref 6–8.2)
RBC # BLD AUTO: 3.7 M/UL (ref 4.2–5.4)
SODIUM SERPL-SCNC: 134 MMOL/L (ref 135–145)
WBC # BLD AUTO: 13.3 K/UL (ref 4.8–10.8)

## 2018-07-02 PROCEDURE — G8989 SELF CARE D/C STATUS: HCPCS | Mod: CJ

## 2018-07-02 PROCEDURE — 97535 SELF CARE MNGMENT TRAINING: CPT

## 2018-07-02 PROCEDURE — 99232 SBSQ HOSP IP/OBS MODERATE 35: CPT | Performed by: INTERNAL MEDICINE

## 2018-07-02 PROCEDURE — A9270 NON-COVERED ITEM OR SERVICE: HCPCS | Performed by: INTERNAL MEDICINE

## 2018-07-02 PROCEDURE — 82962 GLUCOSE BLOOD TEST: CPT | Mod: 91

## 2018-07-02 PROCEDURE — 700102 HCHG RX REV CODE 250 W/ 637 OVERRIDE(OP): Performed by: HOSPITALIST

## 2018-07-02 PROCEDURE — G8979 MOBILITY GOAL STATUS: HCPCS | Mod: CJ

## 2018-07-02 PROCEDURE — G8988 SELF CARE GOAL STATUS: HCPCS | Mod: CJ

## 2018-07-02 PROCEDURE — 700105 HCHG RX REV CODE 258: Performed by: HOSPITALIST

## 2018-07-02 PROCEDURE — 700111 HCHG RX REV CODE 636 W/ 250 OVERRIDE (IP): Performed by: HOSPITALIST

## 2018-07-02 PROCEDURE — 97116 GAIT TRAINING THERAPY: CPT

## 2018-07-02 PROCEDURE — 770006 HCHG ROOM/CARE - MED/SURG/GYN SEMI*

## 2018-07-02 PROCEDURE — G8987 SELF CARE CURRENT STATUS: HCPCS | Mod: CJ

## 2018-07-02 PROCEDURE — G8980 MOBILITY D/C STATUS: HCPCS | Mod: CJ

## 2018-07-02 PROCEDURE — A9270 NON-COVERED ITEM OR SERVICE: HCPCS | Performed by: HOSPITALIST

## 2018-07-02 PROCEDURE — 85025 COMPLETE CBC W/AUTO DIFF WBC: CPT

## 2018-07-02 PROCEDURE — 80053 COMPREHEN METABOLIC PANEL: CPT

## 2018-07-02 PROCEDURE — 700102 HCHG RX REV CODE 250 W/ 637 OVERRIDE(OP): Performed by: INTERNAL MEDICINE

## 2018-07-02 PROCEDURE — 99232 SBSQ HOSP IP/OBS MODERATE 35: CPT | Performed by: HOSPITALIST

## 2018-07-02 RX ORDER — IBUPROFEN 200 MG
800 TABLET ORAL EVERY 6 HOURS PRN
Status: DISCONTINUED | OUTPATIENT
Start: 2018-07-02 | End: 2018-07-10 | Stop reason: HOSPADM

## 2018-07-02 RX ORDER — DEXTROSE MONOHYDRATE 25 G/50ML
25 INJECTION, SOLUTION INTRAVENOUS
Status: DISCONTINUED | OUTPATIENT
Start: 2018-07-02 | End: 2018-07-07

## 2018-07-02 RX ORDER — INSULIN GLARGINE 100 [IU]/ML
30 INJECTION, SOLUTION SUBCUTANEOUS NIGHTLY
Status: DISCONTINUED | OUTPATIENT
Start: 2018-07-02 | End: 2018-07-10 | Stop reason: HOSPADM

## 2018-07-02 RX ADMIN — INSULIN GLARGINE 30 UNITS: 100 INJECTION, SOLUTION SUBCUTANEOUS at 21:03

## 2018-07-02 RX ADMIN — INSULIN LISPRO 3 UNITS: 100 INJECTION, SOLUTION INTRAVENOUS; SUBCUTANEOUS at 11:29

## 2018-07-02 RX ADMIN — LISINOPRIL 10 MG: 5 TABLET ORAL at 11:17

## 2018-07-02 RX ADMIN — IBUPROFEN 800 MG: 200 TABLET, FILM COATED ORAL at 11:19

## 2018-07-02 RX ADMIN — NICOTINE POLACRILEX 2 MG: 2 GUM, CHEWING ORAL at 18:17

## 2018-07-02 RX ADMIN — INSULIN LISPRO 3 UNITS: 100 INJECTION, SOLUTION INTRAVENOUS; SUBCUTANEOUS at 06:07

## 2018-07-02 RX ADMIN — IBUPROFEN 800 MG: 200 TABLET, FILM COATED ORAL at 19:50

## 2018-07-02 RX ADMIN — INSULIN LISPRO 3 UNITS: 100 INJECTION, SOLUTION INTRAVENOUS; SUBCUTANEOUS at 21:02

## 2018-07-02 RX ADMIN — VANCOMYCIN HYDROCHLORIDE 1300 MG: 5 INJECTION, POWDER, LYOPHILIZED, FOR SOLUTION INTRAVENOUS at 05:12

## 2018-07-02 RX ADMIN — NICOTINE 21 MG: 21 PATCH, EXTENDED RELEASE TRANSDERMAL at 05:10

## 2018-07-02 RX ADMIN — NICOTINE POLACRILEX 2 MG: 2 GUM, CHEWING ORAL at 21:06

## 2018-07-02 RX ADMIN — INSULIN LISPRO 4 UNITS: 100 INJECTION, SOLUTION INTRAVENOUS; SUBCUTANEOUS at 17:05

## 2018-07-02 RX ADMIN — SIMVASTATIN 20 MG: 20 TABLET, FILM COATED ORAL at 21:00

## 2018-07-02 RX ADMIN — MULTIPLE VITAMINS W/ MINERALS TAB 2 TABLET: TAB at 11:16

## 2018-07-02 RX ADMIN — SENNOSIDES AND DOCUSATE SODIUM 1 TABLET: 8.6; 5 TABLET ORAL at 21:00

## 2018-07-02 RX ADMIN — INSULIN LISPRO 7 UNITS: 100 INJECTION, SOLUTION INTRAVENOUS; SUBCUTANEOUS at 06:08

## 2018-07-02 RX ADMIN — NICOTINE POLACRILEX 2 MG: 2 GUM, CHEWING ORAL at 11:20

## 2018-07-02 RX ADMIN — VANCOMYCIN HYDROCHLORIDE 1300 MG: 5 INJECTION, POWDER, LYOPHILIZED, FOR SOLUTION INTRAVENOUS at 16:57

## 2018-07-02 RX ADMIN — NICOTINE POLACRILEX 2 MG: 2 GUM, CHEWING ORAL at 06:09

## 2018-07-02 RX ADMIN — IBUPROFEN 400 MG: 200 TABLET, FILM COATED ORAL at 05:10

## 2018-07-02 ASSESSMENT — PAIN SCALES - GENERAL
PAINLEVEL_OUTOF10: ASSUMED PAIN PRESENT
PAINLEVEL_OUTOF10: ASSUMED PAIN PRESENT
PAINLEVEL_OUTOF10: 5
PAINLEVEL_OUTOF10: ASSUMED PAIN PRESENT

## 2018-07-02 ASSESSMENT — ENCOUNTER SYMPTOMS
BLURRED VISION: 0
CHILLS: 0
SHORTNESS OF BREATH: 0
PALPITATIONS: 0
MYALGIAS: 1
FEVER: 0
TINGLING: 0
COUGH: 0
BACK PAIN: 0
INSOMNIA: 1
NECK PAIN: 0
SORE THROAT: 0
NAUSEA: 0
HALLUCINATIONS: 0
ABDOMINAL PAIN: 0
NERVOUS/ANXIOUS: 1
DIZZINESS: 0
EYE PAIN: 0

## 2018-07-02 ASSESSMENT — COGNITIVE AND FUNCTIONAL STATUS - GENERAL
DRESSING REGULAR LOWER BODY CLOTHING: A LITTLE
SUGGESTED CMS G CODE MODIFIER DAILY ACTIVITY: CJ
TOILETING: A LITTLE
HELP NEEDED FOR BATHING: A LITTLE
DAILY ACTIVITIY SCORE: 21

## 2018-07-02 ASSESSMENT — GAIT ASSESSMENTS
DISTANCE (FEET): 30
GAIT LEVEL OF ASSIST: STAND BY ASSIST
ASSISTIVE DEVICE: FRONT WHEEL WALKER

## 2018-07-02 NOTE — DISCHARGE PLANNING
Received Choice form at 1515  Agency/Facility Name: [facility]  Referral sent per Choice form at 1520.  Choice obtained by Joe ONEILL

## 2018-07-02 NOTE — DISCHARGE PLANNING
Received Choice form at [phone]  Agency[facility]Facility Name: [facility], [facility], [facility], [facility], [facility], [place], and [facility].  Referral sent per Choice form at 0953.  Choice obtained by NINO Diaz

## 2018-07-02 NOTE — PROGRESS NOTES
"Pt refusing oral medication at this time. Pt continues to appear frustrated and responds angrily when attempting to provide care. Pt educated on taking po meds and states \" I can't even sit up right nor\". Pt refusing assistance and continued to sleep. Will attempt to administer medication at later time.   "

## 2018-07-02 NOTE — CARE PLAN
Problem: Safety  Goal: Will remain free from falls  Outcome: PROGRESSING AS EXPECTED  Pt reoriented to fall risk, call light within reach, safety precautions in place, encouraged to call if assistance is needed.     Problem: Pain Management  Goal: Pain level will decrease to patient's comfort goal  Outcome: PROGRESSING AS EXPECTED  Pt reoriented to 0-10 pain scale, orders in place to control pt's pain, will give PRN meds when needed.

## 2018-07-02 NOTE — DISCHARGE PLANNING
Agency/Facility Name: [facility]  Spoke To: Shaniqua  Outcome: Patient declined due to behavior.   NINO Hedrick notified.

## 2018-07-02 NOTE — DIETARY
NUTRITION SERVICES: BMI - Pt with BMI >40 (=42.7).     Pt with LOS of 6 days with BMI of 42.7. Pt presented on 6/26 with open draining sore on her Left 1st metatarsal base. Pt noted as disheveled and hostile. Pt not forthcoming when asked questions per H&P and described as angry, blunt, labile, inappropriate and though content is paranoid. Pt with hx of bipolar disorder. Hx of an amputation last fall and current site of importance adjacent to her surgical side per assessment and plan on 6/26 by MD. Wound noted to be deep with undermining and thought to have osteomyelitis and later confirmed in MD note on 6/27 per MRI. Per OP report on 6/27, pt with I&D and L foot first ray amputation. Per flowsheet, pt also with Digit 1 DM ulcer POA. Pt with A1C on 6/26/18 of 7.7% and A1C goal for those with DM is <7.0%. Current Diabetic Diet order and intake documented at % x 11 meals. Current intake sufficient for wound healing though a poorly controlled diabetic. No education consult at this time. RD to monitor and consult as needed.     Labs, MAR and chart Reviewed. (note insulin recently increased per MD note on 7/1).    Weight loss counseling not appropriate in acute care setting. Per physical exam on admit, pt presented with edema and erythema to left shin > R.     RECOMMEND - Referral to outpatient nutrition services for weight management after D/C.

## 2018-07-02 NOTE — DISCHARGE PLANNING
Agency/Facility Name: [facility]  Spoke To: Keith  Outcome: Patient declined due to behavior.

## 2018-07-02 NOTE — DISCHARGE PLANNING
Agency/Facility Name: [facility]  Spoke To: Marah  Outcome: Patient declined due to behavior and patient not participating in therapy.  NINO Hedrick notified.

## 2018-07-02 NOTE — PROGRESS NOTES
"Pt awake, alert and oriented. Pt states she would like to use come, but refusing staff to touch her. Pt states \" I don't need your fucken help\". Pt  swearing and states she is in too much pain to be \" polite\". Pt asked staff to leave her alone. Pt reminded about safety measures , pt refusing understandment.   "

## 2018-07-02 NOTE — PROGRESS NOTES
Infectious Disease Progress Note    Author: Kacey Goel M.D. Date & Time of service: 2018  1:33 PM    Chief Complaint:  FU left foot osteomyelitis    Interval History:   AF no CBC s/p left 1st ray amputation, I&D yesterday, afraid to look at surgical site, tolerating abx without issues   AF WBC 12.5 pt frustrated bc she did not sleep well overnight due to noise from IV box, emotionally labile and nearly in tears, OR cx NGTD   AF lots of complaints-mostly about blood draws and IV access   AF refusal of labs this am noted-plan for PICC   AF WBC 13 lots of pain complaints; hostile; refused most of exam. Refused meds this am  Labs Reviewed, Medications Reviewed, Radiology Reviewed and Wound Reviewed.    Review of Systems:  Review of Systems   Unable to perform ROS: Other   Constitutional: Negative for fever.   Musculoskeletal: Positive for joint pain and myalgias.       Hemodynamics:  Temp (24hrs), Av.7 °C (98.1 °F), Min:36.4 °C (97.6 °F), Max:37 °C (98.6 °F)  Temperature: 37 °C (98.6 °F)  Pulse  Av.6  Min: 64  Max: 125   Blood Pressure: 150/79       Physical Exam:  Physical Exam   Constitutional: She appears well-developed and well-nourished. No distress.   Obese   HENT:   Head: Normocephalic and atraumatic.   Eyes: EOM are normal. Pupils are equal, round, and reactive to light. No scleral icterus.   Neck: Neck supple.   Cardiovascular: Normal rate.    Pulmonary/Chest: Effort normal. No respiratory distress.   Musculoskeletal: She exhibits edema.   Wound VAC LLE  PICC-no visible erythema or ecchymosis   Neurological: She is alert.   Skin: No rash noted. She is not diaphoretic.   Psychiatric:   uncooperative         Nursing note and vitals reviewed.      Meds:    Current Facility-Administered Medications:   •  [DISCONTINUED] insulin glargine **AND** insulin lispro **AND** insulin lispro **AND** Accu-Chek ACHS **AND** NOTIFY MD and PharmD **AND** glucose 4 g **AND** dextrose 50%  •   insulin glargine  •  ibuprofen  •  vancomycin  •  MD ALERT... vancomycin  •  QUEtiapine  •  traZODone  •  lisinopril  •  simvastatin  •  therapeutic multivitamin-minerals  •  senna-docusate **AND** polyethylene glycol/lytes **AND** magnesium hydroxide **AND** bisacodyl  •  haloperidol lactate  •  temazepam  •  nicotine **AND** Nicotine Replacement Patient Education Materials **AND** nicotine polacrilex  •  [DISCONTINUED] insulin regular **AND** Accu-Chek ACHS **AND** NOTIFY MD and PharmD **AND** glucose 4 g **AND** dextrose 50%    Labs:  Recent Labs      07/02/18   0555   WBC  13.3*   RBC  3.70*   HEMOGLOBIN  11.9*   HEMATOCRIT  34.9*   MCV  94.3   MCH  32.2   RDW  47.3   PLATELETCT  211   MPV  9.7   NEUTSPOLYS  71.20   LYMPHOCYTES  17.60*   MONOCYTES  7.20   EOSINOPHILS  2.60   BASOPHILS  0.50     Recent Labs      07/02/18   0555   SODIUM  134*   POTASSIUM  4.8   CHLORIDE  104   CO2  24   GLUCOSE  181*   BUN  20     Recent Labs      07/02/18   0555   ALBUMIN  3.4   TBILIRUBIN  0.6   ALKPHOSPHAT  47   TOTPROTEIN  6.3   ALTSGPT  22   ASTSGOT  21   CREATININE  0.84       Imaging:  Dx-chest-limited (1 View)    Result Date: 6/26/2018 6/26/2018 11:04 AM HISTORY/REASON FOR EXAM: Diabetes, great toe nonhealing wound. Amputation 7 months ago. TECHNIQUE/EXAM DESCRIPTION AND NUMBER OF VIEWS: Single portable view of the chest. COMPARISON: None. FINDINGS: Rotated to the right Cardiomediastinal contours are normal. Lungs demonstrate no consolidation. No pneumothorax is seen. Probable left os acromiale     No radiographic evidence of acute cardiopulmonary process.    Dx-foot-complete 3+ Left  Result Date: 6/26/2018 6/26/2018 11:11 AM HISTORY/REASON FOR EXAM:  Great toe nonhealing wound. TECHNIQUE/EXAM DESCRIPTION AND NUMBER OF VIEWS: 3 nonweightbearing views of the LEFT foot. COMPARISON:  None. FINDINGS: There has been a great toe amputation. The cutaneous stump margin is irregular with gas density extending over the metatarsal  "head. Half of the medial aspect of the subarticular metatarsal head is irregular and absent with a moderately appearance. No other bony destruction is seen although the medial first metatarsal head sesamoid is not seen which could be surgically absent or completely eroded No acute displaced fracture is noted. There is no subluxation.   Status post great toe amputation with metatarsal head erosive changes compatible with osteomyelitis Medial forefoot skin ulceration    Mr-foot-w/o Left  Result Date: 6/26/2018  1.  Severely degraded by motion artifact 2.  First metatarsal osteomyelitis spares the proximal third 3.  Second metatarsal head osteomyelitis is favored over abnormal stress response/fracture 4.  Plantar third metatarsal head osteomyelitis or reactive change is possible. Unfortunately the severe motion artifact prevents more precise characterization 5.  Great toe amputation with large cutaneous defect and adjacent cellulitis at the operative bed      Micro:  Results     Procedure Component Value Units Date/Time    BLOOD CULTURE [[id number]] Collected:  06/26/18 1300    Order Status:  Completed Specimen:  Blood from Peripheral Updated:  07/01/18 2217     Significant Indicator NEG     Source BLD     Site Peripheral     Blood Culture No growth after 5 days of incubation.  Blood culture testing and Gram stain, if indicated, are  performed at [facility], [address] [place] [address].  Positive blood cultures are  sent to [facility], [address] [place] [address], for organism identification and  susceptibility testing.      Narrative:       Per Hospital Policy: Only change Specimen Src: to \"Line\" if  specified by physician order.    BLOOD CULTURE [[id number]] Collected:  06/26/18 1220    Order Status:  Completed Specimen:  Blood from Peripheral Updated:  07/01/18 2217     Significant Indicator NEG     Source BLD     Site Peripheral     Blood Culture No growth " "after 5 days of incubation.  Blood culture testing and Gram stain, if indicated, are  performed at [facility], [address] [place] [address].  Positive blood cultures are  sent to [facility], [address] [place] [address], for organism identification and  susceptibility testing.      Narrative:       Per Hospital Policy: Only change Specimen Src: to \"Line\" if  specified by physician order.    CULTURE TISSUE W/ GRM STAIN [[id number]]  (Abnormal) Collected:  06/27/18 1700    Order Status:  Completed Specimen:  Tissue Updated:  06/30/18 1403     Significant Indicator POS (POS)     Source TISS     Site Left Foot Cultures     Tissue Culture Growth noted after further incubation, see below for  organism identification.   (A)     Gram Stain Result Rare WBCs.  No organisms seen.       Tissue Culture Diphtheroids  Rare growth   (A)    ANAEROBIC CULTURE [[id number]] Collected:  06/27/18 1700    Order Status:  Completed Specimen:  Tissue Updated:  06/30/18 1403     Significant Indicator NEG     Source TISS     Site Left Foot Cultures     Anaerobic Culture, Culture Res No Anaerobes isolated.    ANAEROBIC CULTURE [[id number]] Collected:  06/27/18 1644    Order Status:  Completed Specimen:  Wound Updated:  06/30/18 1402     Significant Indicator NEG     Source WND     Site Left Foot Bone     Anaerobic Culture, Culture Res No Anaerobes isolated.    Narrative:       a - left foot bone and tissue  b - left second metacarpal head  c 1-2 - left foot cultures    CULTURE TISSUE W/ GRM STAIN [[id number]]  (Abnormal) Collected:  06/27/18 1644    Order Status:  Completed Specimen:  Wound Updated:  06/30/18 1402     Significant Indicator POS (POS)     Source WND     Site Left Foot Bone     Tissue Culture Growth noted after further incubation, see below for  organism identification.   (A)     Gram Stain Result No organisms seen.     Tissue Culture Diphtheroids  Light growth   (A)    " Narrative:       a - left foot bone and tissue  b - left second metacarpal head  c 1-2 - left foot cultures    CULTURE WOUND W/ GRAM STAIN [[id number]] Collected:  06/26/18 1421    Order Status:  Completed Specimen:  Wound from Left Foot Updated:  06/28/18 0958     Gram Stain Result Moderate WBCs.  Rare Gram positive cocci.       Significant Indicator NEG     Source WND     Site LEFT FOOT     Culture Result Wound Moderate growth mixed skin kelley.    CULTURE WOUND W/ GRAM STAIN [[id number]] Collected:  06/26/18 1113    Order Status:  Completed Specimen:  Wound from Left Foot Updated:  06/28/18 0947     Gram Stain Result Many WBCs.  Moderate Gram positive cocci.  Moderate Gram positive rods.       Significant Indicator NEG     Source WND     Site LEFT FOOT     Culture Result Wound Moderate growth mixed skin kelley.    GRAM STAIN [[id number]] Collected:  06/27/18 1644    Order Status:  Completed Specimen:  Wound Updated:  06/27/18 2307     Significant Indicator .     Source WND     Site Left Foot Bone     Gram Stain Result No organisms seen.    Narrative:       a - left foot bone and tissue  b - left second metacarpal head  c 1-2 - left foot cultures    GRAM STAIN [[id number]] Collected:  06/27/18 1700    Order Status:  Completed Specimen:  Tissue Updated:  06/27/18 2307     Significant Indicator .     Source TISS     Site Left Foot Cultures     Gram Stain Result Rare WBCs.  No organisms seen.      CULTURE WOUND W/ GRAM STAIN [[id number]] Collected:  06/27/18 1644    Order Status:  Canceled Specimen:  Other Updated:  06/27/18 1803    GRAM STAIN [[id number]] Collected:  06/26/18 1421    Order Status:  Completed Specimen:  Wound Updated:  06/27/18 0754     Significant Indicator .     Source WND     Site LEFT FOOT     Gram Stain Result Moderate WBCs.  Rare Gram positive cocci.      GRAM STAIN [[id number]] Collected:  06/26/18 1113    Order Status:  Completed Specimen:  Wound Updated:  06/27/18 0754     Significant Indicator .      Source WND     Site LEFT FOOT     Gram Stain Result Many WBCs.  Moderate Gram positive cocci.  Moderate Gram positive rods.            Assessment:  Active Hospital Problems    Diagnosis   • Diabetic neuropathy ([facility]) [E11.40]   • Osteomyelitis ([facility]) [M86.9]   • Bipolar I disorder with gwen ([facility]) [F31.10]   • Morbid obesity ([facility]) [E66.01]   • Type 2 diabetes mellitus with neurologic complication ([facility]) [E11.49]   • Diabetic foot infection ([facility]) [E11.628, L08.9]   • Methadone dependence ([facility]) [F11.20]   • Sepsis ([facility]) [A41.9]   • Bed bug bite [W57.XXXA]       Plan:  Left foot osteomyelitis  Afebrile  Leukocytosis  S/p left great toe amp in Nov 2017 by Dr. Merlos  MRI +OM digits 1-3  S/p I and D debridement, left diabetic foot ulcer, left first ray amputation, left gastrocnemius recession and arthrotomy, second metatarsophalangeal joint on 6/27  OR bone cultures - diptheroids  Path - +OM  PICC  Continue vancomycin due to diptheroids-monitor renal function  Anticipate 6 weeks abx-from date of surgery  Stop date 8/8/2018    Leukocytosis  2/2 above  On abx above  Monitor    Right great toe diabetic ulcer  Not actively infected  Wound care    DM2  HgA1c 7.7  Maintain BS less than 150 to control infection and wound healing    Bipolar disorder  Homelessness    Will need placement, however, behaviour is a barrier-noncompliance  Will sign off-please reconsult if needed  Discussed with internal medicine/Dr Montgomery.

## 2018-07-02 NOTE — PROGRESS NOTES
1500Report received, plan of care reviewed and discussed, assessment complete, oriented to room, bed alarm on, nonskid socks applied, advised to call for assistance.   1700 Sleeping, call light within reach.  1845 Report given to next shift.

## 2018-07-02 NOTE — DISCHARGE PLANNING
Per [facility], all local skilled nursing facilities have declined the pt.     Order placed for LTACH.  Pt is agreeable to send out a referral to [facility].  RAYA faxed choice to [facility].

## 2018-07-02 NOTE — THERAPY
"Physical Therapy Treatment completed.   Bed Mobility:  Supine to Sit: Modified Independent  Transfers: Sit to Stand: Modified Independent  Gait: Level Of Assist: Stand by Assist with Front-Wheel Walker x30 ft      Plan of Care: Patient with no further skilled PT needs in the acute care setting at this time  Discharge Recommendations: Equipment: Front-Wheel Walker and Wheelchair.     Pt is able to amb short distances in room with FWW and maintain NWB L LE. Pt reports would prefer to use a w/c for longer distances and is not interested in ambulating any further. Pt also demo's safe tech with pivot transfer to BSC, maintaining NWB L LE. Pt is not interested with further therapy at this time, was very reluctant to participate throughout session and easily argumentative. Recommend pt use w/c for longer distances but is able to ambulate to the bathroom with 1 person SBA, recommend pt stop using BSC. Expressed PT recs to pt who was indifferent and not open to PT suggestions. D/W RN as well. Will DC PT.    See \"Rehab Therapy-Acute\" Patient Summary Report for complete documentation.       "

## 2018-07-02 NOTE — PROGRESS NOTES
"[facility] Hospitalist Progress Note    Date of Service: 7/2/2018    Chief Complaint  48 y.o. female admitted 6/26/2018 with diabetic foot ulceration osteomyelitis and associated sepsis.     Interval Problem Update    Still refusing med and assessments. Still extremely belligerent to staff.    Persistently belligerent to all staff. Pt states \"I don't need your fucking help!\". Pt  swearing and states she is in too much pain to be polite. Pt asked staff to leave her alone.     ID stating- Anticipate 6 weeks abx-from date of surgery  Stop date 8/8/2018  Preprandial insulin increased to 10 and lantus increase to 30    Consultants/Specialty  ortho  ID    Disposition  Hospital.     MRI foot:  1.  Severely degraded by motion artifact  2.  First metatarsal osteomyelitis spares the proximal third  3.  Second metatarsal head osteomyelitis is favored over abnormal stress response/fracture  4.  Plantar third metatarsal head osteomyelitis or reactive change is possible. Unfortunately the severe motion artifact prevents more precise characterization  5.  Great toe amputation with large cutaneous defect and adjacent cellulitis at the operative bed    OPERATION PERFORMED:  1.  Irrigation with excisional debridement, left diabetic foot ulcer.  2.  Left first ray amputation.  3.  Left gastrocnemius recession.  4.  Arthrotomy, second metatarsophalangeal joint.  5.  Placement of a negative pressure dressing of less than 50 square   Centimeters.        Review of Systems   Constitutional: Negative for chills and fever.   HENT: Negative for sore throat.    Eyes: Negative for blurred vision and pain.   Respiratory: Negative for cough and shortness of breath.    Cardiovascular: Negative for chest pain and palpitations.   Gastrointestinal: Negative for abdominal pain and nausea.   Genitourinary: Negative for dysuria and urgency.   Musculoskeletal: Negative for back pain and neck pain.        Reports foot pain   Skin: Negative for itching and " rash.   Neurological: Negative for dizziness and tingling.   Psychiatric/Behavioral: Negative for hallucinations. The patient is nervous/anxious and has insomnia.    All other systems reviewed and are negative.     Physical Exam  Laboratory/Imaging   Hemodynamics  Temp (24hrs), Av.7 °C (98.1 °F), Min:36.4 °C (97.6 °F), Max:36.9 °C (98.5 °F)   Temperature: 36.4 °C (97.6 °F)  Pulse  Av.4  Min: 64  Max: 125    Blood Pressure: 120/61      Respiratory      Respiration: 18, Pulse Oximetry: 95 %     Work Of Breathing / Effort: Mild  RUL Breath Sounds: Diminished, RML Breath Sounds: Diminished, RLL Breath Sounds: Diminished, SAUL Breath Sounds: Diminished, LLL Breath Sounds: Diminished    Fluids  No intake or output data in the 24 hours ending 18 0834    Nutrition  Orders Placed This Encounter   Procedures   • Diet Order Diabetic     Standing Status:   Standing     Number of Occurrences:   1     Order Specific Question:   Diet:     Answer:   Diabetic [3]     Physical Exam   Constitutional: She is oriented to person, place, and time. She appears well-developed and well-nourished. No distress.   Patient seen and examined  Discussed plan with RN   SHARONT:   Right Ear: External ear normal.   Left Ear: External ear normal.   Mouth/Throat: Oropharynx is clear and moist.   Eyes: Conjunctivae are normal. Right eye exhibits no discharge. Left eye exhibits no discharge.   Neck: No JVD present.   Cardiovascular: Regular rhythm and normal heart sounds.    No murmur heard.       Pulmonary/Chest: Effort normal and breath sounds normal. No stridor. No respiratory distress. She has no rales.   Abdominal: Soft. Bowel sounds are normal. She exhibits no distension. There is no tenderness.   Musculoskeletal: She exhibits no edema or tenderness.   Left foot dressed.  Small ulceration right great toe on base about 4mm in size.    Neurological: She is alert and oriented to person, place, and time.   Skin: Skin is warm and dry. She is  not diaphoretic. No erythema.   Psychiatric: Her mood appears anxious. Her affect is labile and inappropriate. She is agitated and hyperactive. She expresses impulsivity and inappropriate judgment.   Rude and disrespectful behavior- will not look at you and answer you frequently.    Nursing note and vitals reviewed.      Recent Labs      07/02/18   0555   WBC  13.3*   RBC  3.70*   HEMOGLOBIN  11.9*   HEMATOCRIT  34.9*   MCV  94.3   MCH  32.2   MCHC  34.1   RDW  47.3   PLATELETCT  211   MPV  9.7     Recent Labs      07/02/18   0555   SODIUM  134*   POTASSIUM  4.8   CHLORIDE  104   CO2  24   GLUCOSE  181*   BUN  20   CREATININE  0.84   CALCIUM  8.5                      Assessment/Plan     Bipolar I disorder with gwen ([facility])   Assessment & Plan    manic behavior and manipulative behavior severe - highly labile  Refuses seroquel   Prn ativan stopped- no further controlled substances appropriate.   Will leave seroquel on MAR and encourage her to take this.   ?personality disorder- likely         Osteomyelitis ([facility])   Assessment & Plan    S/p surgery  Iv abx per ID          Diabetic neuropathy ([facility])   Assessment & Plan    Consider neurontin but says she gets swelling with this.         Bed bug bite   Assessment & Plan    Patient has numerous lesions on her lower extremity consistent with bedbug bites  No bugs noted here.   She is homeless        Sepsis ([facility])   Assessment & Plan    This is sepsis (without associated acute organ dysfunction).   2/2 DM foot infection with osteomyelitis  Blood, wound cultures neg to date  Iv abx per ID  resolving          Methadone dependence ([facility])   Assessment & Plan    Patient denies use of narcotics or methadone tox screen is positive  NO opiates should be used here.   Off all opiates  Motrin for pain  Patient continues to have drug seeking behavior.         Diabetic foot infection ([facility])   Assessment & Plan    history of amputation of left great toe last fall  With associated  osteomyelitis.   S/p i/d, 1st ray amputation wound vac  Iv abx per ID        Type 2 diabetes mellitus with neurologic complication ([facility])   Assessment & Plan    Complicated by neuropathy, hyperglycemia, vascular, infectious and renal complications.   Continue Basal and lantus- titrating to control daily  increase lantus again today  SSI/preprandial insulin increased  DM diet  A1C 7.7        Morbid obesity ([facility])   Assessment & Plan    Complicated by infection, diabetes. Needs weight loss.           Quality-Core Measures   Reviewed items::  EKG reviewed, Labs reviewed, Medications reviewed and Radiology images reviewed  Hernandez catheter::  No Hernandez  DVT prophylaxis pharmacological::  Heparin  DVT prophylaxis - mechanical:  SCDs  Ulcer Prophylaxis::  Yes  Antibiotics:  Treating active infection/contamination beyond 24 hours perioperative coverage  Assessed for rehabilitation services:  Patient was assess for and/or received rehabilitation services during this hospitalization

## 2018-07-02 NOTE — DISCHARGE PLANNING
Agency/Facility Name: [facility]  Spoke To: Cintia   Outcome: Patient declined due to not being contacted with insurance.  NINO Hedrick notified.

## 2018-07-02 NOTE — DISCHARGE PLANNING
Order placed for SNF.  Pt is agreeable to send out blanket referral to [place] facilities.  SW faxed choice to [facility].

## 2018-07-02 NOTE — DISCHARGE PLANNING
Agency/Facility Name: [facility]  Outcome: Declined patient, due to no medicaid beds at this time.

## 2018-07-02 NOTE — DISCHARGE PLANNING
Agency/Facility Name: [facility]  Outcome: Patient declined due to drugs and bed bugs    Agency/Facility Name: [facility]  Spoke To: Rae  Outcome: Patient declined due to behavior    Agency/Facility Name: [facility]  Spoke To: Fabiola  Outcome: Patient declined due to not participating in therapy and no DC Plan    NINO Hedrick notified.

## 2018-07-03 LAB
ANION GAP SERPL CALC-SCNC: 6 MMOL/L (ref 0–11.9)
BASOPHILS # BLD AUTO: 0.5 % (ref 0–1.8)
BASOPHILS # BLD: 0.08 K/UL (ref 0–0.12)
BUN SERPL-MCNC: 15 MG/DL (ref 8–22)
CALCIUM SERPL-MCNC: 8.5 MG/DL (ref 8.4–10.2)
CHLORIDE SERPL-SCNC: 105 MMOL/L (ref 96–112)
CO2 SERPL-SCNC: 25 MMOL/L (ref 20–33)
CREAT SERPL-MCNC: 0.93 MG/DL (ref 0.5–1.4)
EOSINOPHIL # BLD AUTO: 0.29 K/UL (ref 0–0.51)
EOSINOPHIL NFR BLD: 2 % (ref 0–6.9)
ERYTHROCYTE [DISTWIDTH] IN BLOOD BY AUTOMATED COUNT: 48.2 FL (ref 35.9–50)
GLUCOSE BLD-MCNC: 146 MG/DL (ref 65–99)
GLUCOSE BLD-MCNC: 146 MG/DL (ref 65–99)
GLUCOSE BLD-MCNC: 151 MG/DL (ref 65–99)
GLUCOSE BLD-MCNC: 221 MG/DL (ref 65–99)
GLUCOSE BLD-MCNC: 239 MG/DL (ref 65–99)
GLUCOSE SERPL-MCNC: 226 MG/DL (ref 65–99)
HCT VFR BLD AUTO: 36 % (ref 37–47)
HGB BLD-MCNC: 12.3 G/DL (ref 12–16)
IMM GRANULOCYTES # BLD AUTO: 0.17 K/UL (ref 0–0.11)
IMM GRANULOCYTES NFR BLD AUTO: 1.1 % (ref 0–0.9)
LYMPHOCYTES # BLD AUTO: 2.11 K/UL (ref 1–4.8)
LYMPHOCYTES NFR BLD: 14.3 % (ref 22–41)
MCH RBC QN AUTO: 32.2 PG (ref 27–33)
MCHC RBC AUTO-ENTMCNC: 34.2 G/DL (ref 33.6–35)
MCV RBC AUTO: 94.2 FL (ref 81.4–97.8)
MONOCYTES # BLD AUTO: 0.83 K/UL (ref 0–0.85)
MONOCYTES NFR BLD AUTO: 5.6 % (ref 0–13.4)
NEUTROPHILS # BLD AUTO: 11.32 K/UL (ref 2–7.15)
NEUTROPHILS NFR BLD: 76.5 % (ref 44–72)
NRBC # BLD AUTO: 0 K/UL
NRBC BLD-RTO: 0 /100 WBC
PLATELET # BLD AUTO: 198 K/UL (ref 164–446)
PMV BLD AUTO: 9.8 FL (ref 9–12.9)
POTASSIUM SERPL-SCNC: 4.1 MMOL/L (ref 3.6–5.5)
RBC # BLD AUTO: 3.82 M/UL (ref 4.2–5.4)
SODIUM SERPL-SCNC: 136 MMOL/L (ref 135–145)
VANCOMYCIN TROUGH SERPL-MCNC: 16.6 UG/ML (ref 10–20)
WBC # BLD AUTO: 14.8 K/UL (ref 4.8–10.8)

## 2018-07-03 PROCEDURE — 99233 SBSQ HOSP IP/OBS HIGH 50: CPT | Performed by: HOSPITALIST

## 2018-07-03 PROCEDURE — 82962 GLUCOSE BLOOD TEST: CPT

## 2018-07-03 PROCEDURE — 700111 HCHG RX REV CODE 636 W/ 250 OVERRIDE (IP): Performed by: HOSPITALIST

## 2018-07-03 PROCEDURE — 80202 ASSAY OF VANCOMYCIN: CPT

## 2018-07-03 PROCEDURE — 700105 HCHG RX REV CODE 258: Performed by: HOSPITALIST

## 2018-07-03 PROCEDURE — 85025 COMPLETE CBC W/AUTO DIFF WBC: CPT

## 2018-07-03 PROCEDURE — 700111 HCHG RX REV CODE 636 W/ 250 OVERRIDE (IP): Performed by: INTERNAL MEDICINE

## 2018-07-03 PROCEDURE — 700102 HCHG RX REV CODE 250 W/ 637 OVERRIDE(OP): Performed by: HOSPITALIST

## 2018-07-03 PROCEDURE — 700102 HCHG RX REV CODE 250 W/ 637 OVERRIDE(OP): Performed by: INTERNAL MEDICINE

## 2018-07-03 PROCEDURE — A9270 NON-COVERED ITEM OR SERVICE: HCPCS | Performed by: INTERNAL MEDICINE

## 2018-07-03 PROCEDURE — 80048 BASIC METABOLIC PNL TOTAL CA: CPT

## 2018-07-03 PROCEDURE — A9270 NON-COVERED ITEM OR SERVICE: HCPCS | Performed by: HOSPITALIST

## 2018-07-03 PROCEDURE — 770006 HCHG ROOM/CARE - MED/SURG/GYN SEMI*

## 2018-07-03 RX ADMIN — SIMVASTATIN 20 MG: 20 TABLET, FILM COATED ORAL at 20:05

## 2018-07-03 RX ADMIN — NICOTINE POLACRILEX 2 MG: 2 GUM, CHEWING ORAL at 17:23

## 2018-07-03 RX ADMIN — VANCOMYCIN HYDROCHLORIDE 1100 MG: 5 INJECTION, POWDER, LYOPHILIZED, FOR SOLUTION INTRAVENOUS at 13:59

## 2018-07-03 RX ADMIN — NICOTINE POLACRILEX 2 MG: 2 GUM, CHEWING ORAL at 01:57

## 2018-07-03 RX ADMIN — NICOTINE 21 MG: 21 PATCH, EXTENDED RELEASE TRANSDERMAL at 05:47

## 2018-07-03 RX ADMIN — ALTEPLASE 2 MG: 2.2 INJECTION, POWDER, LYOPHILIZED, FOR SOLUTION INTRAVENOUS at 06:37

## 2018-07-03 RX ADMIN — LISINOPRIL 10 MG: 5 TABLET ORAL at 08:37

## 2018-07-03 RX ADMIN — IBUPROFEN 800 MG: 200 TABLET, FILM COATED ORAL at 08:38

## 2018-07-03 RX ADMIN — INSULIN LISPRO 4 UNITS: 100 INJECTION, SOLUTION INTRAVENOUS; SUBCUTANEOUS at 15:49

## 2018-07-03 RX ADMIN — NICOTINE POLACRILEX 2 MG: 2 GUM, CHEWING ORAL at 14:00

## 2018-07-03 RX ADMIN — HALOPERIDOL LACTATE 5 MG: 5 INJECTION, SOLUTION INTRAMUSCULAR at 13:59

## 2018-07-03 RX ADMIN — INSULIN LISPRO 4 UNITS: 100 INJECTION, SOLUTION INTRAVENOUS; SUBCUTANEOUS at 06:41

## 2018-07-03 RX ADMIN — MULTIPLE VITAMINS W/ MINERALS TAB 2 TABLET: TAB at 08:37

## 2018-07-03 RX ADMIN — IBUPROFEN 800 MG: 200 TABLET, FILM COATED ORAL at 20:04

## 2018-07-03 RX ADMIN — IBUPROFEN 800 MG: 200 TABLET, FILM COATED ORAL at 01:53

## 2018-07-03 RX ADMIN — INSULIN GLARGINE 30 UNITS: 100 INJECTION, SOLUTION SUBCUTANEOUS at 20:15

## 2018-07-03 RX ADMIN — SENNOSIDES AND DOCUSATE SODIUM 1 TABLET: 8.6; 5 TABLET ORAL at 08:37

## 2018-07-03 RX ADMIN — NICOTINE POLACRILEX 2 MG: 2 GUM, CHEWING ORAL at 08:38

## 2018-07-03 ASSESSMENT — ENCOUNTER SYMPTOMS
HEARTBURN: 0
CONSTIPATION: 0
NECK PAIN: 0
NERVOUS/ANXIOUS: 1
MEMORY LOSS: 0
SPUTUM PRODUCTION: 0
PND: 0
CLAUDICATION: 0
CHILLS: 0
SORE THROAT: 0
SHORTNESS OF BREATH: 0
DEPRESSION: 0
HALLUCINATIONS: 0
HEADACHES: 0
HEMOPTYSIS: 0
BACK PAIN: 0
STRIDOR: 0
DIZZINESS: 0
SPEECH CHANGE: 0
DOUBLE VISION: 0
ORTHOPNEA: 0
TINGLING: 0
VOMITING: 0
WEAKNESS: 1
PHOTOPHOBIA: 0
SENSORY CHANGE: 0
ABDOMINAL PAIN: 0
NAUSEA: 0
MYALGIAS: 0
FEVER: 0
EYE PAIN: 0
BLURRED VISION: 0
COUGH: 0
BLOOD IN STOOL: 0
PALPITATIONS: 0
INSOMNIA: 1
TREMORS: 0

## 2018-07-03 ASSESSMENT — LIFESTYLE VARIABLES: SUBSTANCE_ABUSE: 1

## 2018-07-03 ASSESSMENT — PATIENT HEALTH QUESTIONNAIRE - PHQ9
1. LITTLE INTEREST OR PLEASURE IN DOING THINGS: NOT AT ALL
2. FEELING DOWN, DEPRESSED, IRRITABLE, OR HOPELESS: NOT AT ALL
SUM OF ALL RESPONSES TO PHQ9 QUESTIONS 1 AND 2: 0

## 2018-07-03 ASSESSMENT — PAIN SCALES - GENERAL
PAINLEVEL_OUTOF10: 6
PAINLEVEL_OUTOF10: 5

## 2018-07-03 NOTE — PROGRESS NOTES
Patient received stable, alert times 4, c/o Left great toe phantom pain 5/10, room air, kristen picc in place; flushes and draws blood; patient agreeable for blood draw after breakfast, redness noted to bilateral lower extremities, non-=wt bearing on r-foot, splint on l-foot with wound vac attached, last bm 7/2, commode at bedside, no tele, nka, full code, will continue to monitor.

## 2018-07-03 NOTE — THERAPY
"Occupational Therapy Treatment completed with focus on ADLs, ADL transfers and patient education.  Functional Status:  Pt was in bed at start of session, resistant to OT. Pt was able to complete LB dressing bed level with Mod I, able to complete bed to BSC transfer with Supervision. Pt stated that she did not want any more OT services and did not want follow up checks after this date. Pt was able to maintain NWB with activity and requested discontinuation of services.   Plan of Care: Patient with no further skilled OT needs in the acute care setting at this time  Discharge Recommendations:  Equipment No Equipment Needed. Post-acute therapy Discharge to a transitional care facility for continued skilled therapy services.    See \"Rehab Therapy-Acute\" Patient Summary Report for complete documentation.   "

## 2018-07-03 NOTE — PROGRESS NOTES
"Pharmacy Kinetics Vancomycin Day # 4  7/3/2018    Currently on Vancomycin 1300 mg iv q12hr    Indication for Treatment: Left foot osteomyelitis     Pertinent history per medical record: Admitted on 2018 for worsening foot infection.  Post amputation of left great toe in 2017.  Xray and MRI revealed 1st and 2nd MTH osteomyelitis of the left foot.  S/P left 1st ray amputation , I&D yesterday. OR bone cultures- diptheroids.  has seen patient and anticipates 6 weeks of antibiotics from date of surgery .Stop date 2018    Estimated CrCl =  Estimated Creatinine Clearance: 95.5 mL/min (by C-G formula based on SCr of 0.93 mg/dL).    List concerns for renal function: Elevated BMI of 42.7  Zosyn discontinued on 18  Cultures: 18 Positive Lt. Foot Tissue/Bone - diptheroids  Allergies: Patient has no known allergies.     Recent Labs      18   0555  18   0852   WBC  13.3*  14.8*   NEUTSPOLYS  71.20  76.50*   BUN  20  15   CREATININE  0.84  0.93   VANCOTROUGH   --   16.6        Blood pressure (!) 169/80, pulse 90, temperature 36.9 °C (98.4 °F), resp. rate 18, height 1.676 m (5' 6\"), weight 120 kg (264 lb 8.8 oz), SpO2 94 %, not currently breastfeeding.    Temp (24hrs), Av.8 °C (98.2 °F), Min:36.2 °C (97.2 °F), Max:37 °C (98.6 °F)        A/P 1. Vancomycin dose change: 1100 mg q12hr              2. Next vancomycin level 7/4 at 12:30              3. Goal trough 15 mcg/ml              4. Will continue to monitor and adjust dose as needed per protocol.        Fazal Reynolds PharmD          "

## 2018-07-03 NOTE — PROGRESS NOTES
"[facility] Hospitalist Progress Note    Date of Service: 7/3/2018    Chief Complaint  48 y.o. female admitted 6/26/2018 with diabetic foot ulceration osteomyelitis and associated sepsis.     Interval Problem Update  Still refusing med and assessments. Still extremely belligerent to staff.    Persistently belligerent to all staff. Pt states \"I don't need your fucking help!\". Pt  swearing and states she is in too much pain to be polite. Pt asked staff to leave her alone.   ID stating- Anticipate 6 weeks abx-from date of surgery  Stop date 8/8/2018  Preprandial insulin increased to 10 and lantus increase to 30    7/3  No acute overnight events, patient more compliant today.   serosanguinous drainage noted in wound vac.  On IV antibiotics Continue with vancomycin stop date 8/8/2018  Wound care.  Repeat PT/OT eval today.    Consultants/Specialty  ortho  ID    Disposition  Placement.        Review of Systems   Constitutional: Positive for malaise/fatigue. Negative for chills and fever.   HENT: Negative for congestion, hearing loss, sore throat and tinnitus.    Eyes: Negative for blurred vision, double vision, photophobia and pain.   Respiratory: Negative for cough, hemoptysis, sputum production, shortness of breath and stridor.    Cardiovascular: Negative for chest pain, palpitations, orthopnea, claudication and PND.   Gastrointestinal: Negative for abdominal pain, blood in stool, constipation, heartburn, melena, nausea and vomiting.   Genitourinary: Negative for dysuria, frequency and urgency.   Musculoskeletal: Negative for back pain, myalgias and neck pain.        Reports foot pain   Skin: Negative for itching and rash.   Neurological: Positive for weakness. Negative for dizziness, tingling, tremors, sensory change, speech change and headaches.   Psychiatric/Behavioral: Positive for substance abuse. Negative for depression, hallucinations, memory loss and suicidal ideas. The patient is nervous/anxious and has insomnia.     "   Physical Exam  Laboratory/Imaging   Hemodynamics  Temp (24hrs), Av.6 °C (97.8 °F), Min:36.2 °C (97.2 °F), Max:37 °C (98.6 °F)   Temperature:  (refused V/S)  Pulse  Av.8  Min: 64  Max: 125    Blood Pressure: 124/56      Respiratory      Respiration: 18, Pulse Oximetry: 98 %     Work Of Breathing / Effort: Mild  RUL Breath Sounds: Clear, RML Breath Sounds: Clear, RLL Breath Sounds: Diminished, SAUL Breath Sounds: Clear, LLL Breath Sounds: Diminished    Fluids  No intake or output data in the 24 hours ending 18 0743    Nutrition  Orders Placed This Encounter   Procedures   • Diet Order Diabetic     Standing Status:   Standing     Number of Occurrences:   1     Order Specific Question:   Diet:     Answer:   Diabetic [3]     Physical Exam   Constitutional: She is oriented to person, place, and time. She appears well-developed and well-nourished. No distress.   Patient seen and examined  Discussed plan with RN   HENT:   Head: Normocephalic and atraumatic.   Right Ear: External ear normal.   Left Ear: External ear normal.   Mouth/Throat: Oropharynx is clear and moist. No oropharyngeal exudate.   Eyes: Conjunctivae are normal. Pupils are equal, round, and reactive to light. Right eye exhibits no discharge. Left eye exhibits no discharge. No scleral icterus.   Neck: Neck supple. No JVD present. No thyromegaly present.   Cardiovascular: Regular rhythm, normal heart sounds and intact distal pulses.    No murmur heard.       Pulmonary/Chest: Effort normal and breath sounds normal. No stridor. No respiratory distress. She has no wheezes. She has no rales.   Abdominal: Soft. Bowel sounds are normal. She exhibits no distension. There is no tenderness. There is no rebound.   Musculoskeletal: Normal range of motion. She exhibits no edema or tenderness.   Small 5 mm right great toe base ulceration   Neurological: She is alert and oriented to person, place, and time. No cranial nerve deficit.   Skin: Skin is warm and  dry. She is not diaphoretic. No erythema.   Psychiatric: Thought content normal. Her mood appears anxious.   Pleasant today.   Nursing note and vitals reviewed.      Recent Labs      07/02/18   0555   WBC  13.3*   RBC  3.70*   HEMOGLOBIN  11.9*   HEMATOCRIT  34.9*   MCV  94.3   MCH  32.2   MCHC  34.1   RDW  47.3   PLATELETCT  211   MPV  9.7     Recent Labs      07/02/18   0555   SODIUM  134*   POTASSIUM  4.8   CHLORIDE  104   CO2  24   GLUCOSE  181*   BUN  20   CREATININE  0.84   CALCIUM  8.5                      Assessment/Plan     Bipolar I disorder with gwen ([facility])   Assessment & Plan    Noted to have erratic behavior   Refuses seroquel   May need psych consult if still combative.        Osteomyelitis ([facility])   Assessment & Plan    S/p OPERATION PERFORMED:  1.  Irrigation with excisional debridement, left diabetic foot ulcer.  2.  Left first ray amputation.  3.  Left gastrocnemius recession.  4.  Arthrotomy, second metatarsophalangeal joint.  5.  Placement of a negative pressure dressing of less than 50 square   centimeters.  Cont wound care.  Wound vac changes prn.  antibiotics        Diabetic neuropathy ([facility])   Assessment & Plan    Declines gabapentin, says she gets swelling in legs from this.         Bed bug bite   Assessment & Plan    Patient has numerous lesions on her lower extremity consistent with bedbug bites  No bugs noted here.   Patient is homeless        Sepsis ([facility])   Assessment & Plan    This is sepsis (without associated acute organ dysfunction).   2/2 DM foot infection with osteomyelitis  Bone cultures +Diphtheroids with osteo, s/p I&D.  Continue IV vancomycin  Vancomycin will be titrated to serum concentration levels to ensure adequate levels and reduce risk of toxicity            Methadone dependence ([facility])   Assessment & Plan    Patient denies use of narcotics   Utox + Methadone.  Drug seeking behavior        Diabetic foot infection ([facility])   Assessment & Plan    Hx of left toe amputation.  Now has  recurrent osteomyelitis.  s/p I&D and 1st toe amputation.          Type 2 diabetes mellitus with neurologic complication (HCC)   Assessment & Plan    Complicated by neuropathy and renal impairment from diabetes.  Cont lantus.  Continue Insulin-sliding scale, accu-checks and hypoglycemia protocol.  A1c:7.7          Morbid obesity (HCC)   Assessment & Plan    Recommend diet and exercise.           Quality-Core Measures   Reviewed items::  EKG reviewed, Labs reviewed, Medications reviewed and Radiology images reviewed  Hernandez catheter::  No Hernandez  DVT prophylaxis pharmacological::  Heparin  DVT prophylaxis - mechanical:  SCDs  Ulcer Prophylaxis::  Yes  Antibiotics:  Treating active infection/contamination beyond 24 hours perioperative coverage  Assessed for rehabilitation services:  Patient was assess for and/or received rehabilitation services during this hospitalization        The total time spent face to face with this patient was about 38 mins of which 60% of time was spent on counseling, review of records including pertinent lab data and studies, as well as discussing diagnostic evaluation and work up, planned therapeutic interventions and future disposition of care. Where indicated, the assessment and plan reflect discussion of patient with consultants, other healthcare providers, family members, and additional research needed to obtain further information in formulating the plan of care of this patient. Patient also on high risk medication vancomycin, will closely monitor for toxicity

## 2018-07-03 NOTE — CARE PLAN
Problem: Safety  Goal: Will remain free from injury  Outcome: PROGRESSING AS EXPECTED  Pt reoriented to call light program, encouraged to call for assistance, safety precautions in place    Problem: Knowledge Deficit  Goal: Knowledge of disease process/condition, treatment plan, diagnostic tests, and medications will improve  Outcome: PROGRESSING AS EXPECTED  Pt educated on current plan, all questions answered.

## 2018-07-03 NOTE — DISCHARGE PLANNING
Per Will with [facility], the referral has been received and is still being processed.  He will inform  once a decision has been made.

## 2018-07-03 NOTE — WOUND TEAM
Spoke with Dr. Egnel regarding VAC dressing change being done tomorrow and possible removal tomorrow pending what the incision looks like.  Also informed him that patient denied SNF placement and unclear how long patient will remain at [facility].  He reports wound team can manage VAC per our protocol.  Wound team to see patient tomorrow.

## 2018-07-03 NOTE — PROGRESS NOTES
Received bedside report from gino Mcnamara. Plan of care discussed. Safety measures in place. Pt resting in bed, no complaints as of this time.

## 2018-07-03 NOTE — WOUND TEAM
Observed scant serosanguinous drainage per VAC cannister.  Pump functioning without issue.  Patient has been declined by all SNF in [facility] so discharge not imminent.  Wound team will return Wednesday and remove dressing with possible change in wound care pending drainage.

## 2018-07-03 NOTE — PROGRESS NOTES
0540 went into the room to draw pt's blood for morning labs. After initial flush blood was moving slow regardless of changing position, charge nurse was called to help assist with blood draw. Charge nurse entered the room to help and PICC line became difficult to flush or aspirate and had resistance. Both RNs agreed there was a clot within the line, Dr. Luevano was called for orders. Alteplase was ordered for pt.    0615 Pharmacy brought Alteplase to floor, NAM assisted RN with administration of medication.

## 2018-07-04 LAB
ALBUMIN SERPL BCP-MCNC: 3.4 G/DL (ref 3.2–4.9)
ALBUMIN/GLOB SERPL: 1.2 G/DL
ALP SERPL-CCNC: 50 U/L (ref 30–99)
ALT SERPL-CCNC: 18 U/L (ref 2–50)
ANION GAP SERPL CALC-SCNC: 6 MMOL/L (ref 0–11.9)
AST SERPL-CCNC: 17 U/L (ref 12–45)
BASOPHILS # BLD AUTO: 0.3 % (ref 0–1.8)
BASOPHILS # BLD: 0.04 K/UL (ref 0–0.12)
BILIRUB SERPL-MCNC: 0.6 MG/DL (ref 0.1–1.5)
BUN SERPL-MCNC: 19 MG/DL (ref 8–22)
CALCIUM SERPL-MCNC: 8.1 MG/DL (ref 8.4–10.2)
CHLORIDE SERPL-SCNC: 106 MMOL/L (ref 96–112)
CO2 SERPL-SCNC: 24 MMOL/L (ref 20–33)
CREAT SERPL-MCNC: 0.82 MG/DL (ref 0.5–1.4)
EOSINOPHIL # BLD AUTO: 0.29 K/UL (ref 0–0.51)
EOSINOPHIL NFR BLD: 2.1 % (ref 0–6.9)
ERYTHROCYTE [DISTWIDTH] IN BLOOD BY AUTOMATED COUNT: 48.8 FL (ref 35.9–50)
GLOBULIN SER CALC-MCNC: 2.8 G/DL (ref 1.9–3.5)
GLUCOSE BLD-MCNC: 171 MG/DL (ref 65–99)
GLUCOSE BLD-MCNC: 171 MG/DL (ref 65–99)
GLUCOSE BLD-MCNC: 264 MG/DL (ref 65–99)
GLUCOSE BLD-MCNC: 279 MG/DL (ref 65–99)
GLUCOSE SERPL-MCNC: 126 MG/DL (ref 65–99)
HCT VFR BLD AUTO: 36.1 % (ref 37–47)
HGB BLD-MCNC: 12.1 G/DL (ref 12–16)
IMM GRANULOCYTES # BLD AUTO: 0.11 K/UL (ref 0–0.11)
IMM GRANULOCYTES NFR BLD AUTO: 0.8 % (ref 0–0.9)
LYMPHOCYTES # BLD AUTO: 2.33 K/UL (ref 1–4.8)
LYMPHOCYTES NFR BLD: 17.2 % (ref 22–41)
MAGNESIUM SERPL-MCNC: 1.8 MG/DL (ref 1.5–2.5)
MCH RBC QN AUTO: 31.5 PG (ref 27–33)
MCHC RBC AUTO-ENTMCNC: 33.5 G/DL (ref 33.6–35)
MCV RBC AUTO: 94 FL (ref 81.4–97.8)
MONOCYTES # BLD AUTO: 0.81 K/UL (ref 0–0.85)
MONOCYTES NFR BLD AUTO: 6 % (ref 0–13.4)
NEUTROPHILS # BLD AUTO: 9.98 K/UL (ref 2–7.15)
NEUTROPHILS NFR BLD: 73.6 % (ref 44–72)
NRBC # BLD AUTO: 0 K/UL
NRBC BLD-RTO: 0 /100 WBC
PLATELET # BLD AUTO: 210 K/UL (ref 164–446)
PMV BLD AUTO: 10 FL (ref 9–12.9)
POTASSIUM SERPL-SCNC: 4.3 MMOL/L (ref 3.6–5.5)
PROT SERPL-MCNC: 6.2 G/DL (ref 6–8.2)
RBC # BLD AUTO: 3.84 M/UL (ref 4.2–5.4)
SODIUM SERPL-SCNC: 136 MMOL/L (ref 135–145)
VANCOMYCIN TROUGH SERPL-MCNC: 17.5 UG/ML (ref 10–20)
WBC # BLD AUTO: 13.6 K/UL (ref 4.8–10.8)

## 2018-07-04 PROCEDURE — 700102 HCHG RX REV CODE 250 W/ 637 OVERRIDE(OP): Performed by: HOSPITALIST

## 2018-07-04 PROCEDURE — 83735 ASSAY OF MAGNESIUM: CPT

## 2018-07-04 PROCEDURE — 700102 HCHG RX REV CODE 250 W/ 637 OVERRIDE(OP): Performed by: INTERNAL MEDICINE

## 2018-07-04 PROCEDURE — A9270 NON-COVERED ITEM OR SERVICE: HCPCS | Performed by: HOSPITALIST

## 2018-07-04 PROCEDURE — 99233 SBSQ HOSP IP/OBS HIGH 50: CPT | Performed by: HOSPITALIST

## 2018-07-04 PROCEDURE — 770006 HCHG ROOM/CARE - MED/SURG/GYN SEMI*

## 2018-07-04 PROCEDURE — 80202 ASSAY OF VANCOMYCIN: CPT

## 2018-07-04 PROCEDURE — 85025 COMPLETE CBC W/AUTO DIFF WBC: CPT

## 2018-07-04 PROCEDURE — 700105 HCHG RX REV CODE 258: Performed by: HOSPITALIST

## 2018-07-04 PROCEDURE — A9270 NON-COVERED ITEM OR SERVICE: HCPCS | Performed by: INTERNAL MEDICINE

## 2018-07-04 PROCEDURE — 82962 GLUCOSE BLOOD TEST: CPT | Mod: 91

## 2018-07-04 PROCEDURE — 80053 COMPREHEN METABOLIC PANEL: CPT

## 2018-07-04 PROCEDURE — 700111 HCHG RX REV CODE 636 W/ 250 OVERRIDE (IP): Performed by: HOSPITALIST

## 2018-07-04 RX ADMIN — IBUPROFEN 800 MG: 200 TABLET, FILM COATED ORAL at 23:00

## 2018-07-04 RX ADMIN — LISINOPRIL 10 MG: 5 TABLET ORAL at 09:53

## 2018-07-04 RX ADMIN — INSULIN GLARGINE 30 UNITS: 100 INJECTION, SOLUTION SUBCUTANEOUS at 21:15

## 2018-07-04 RX ADMIN — IBUPROFEN 800 MG: 200 TABLET, FILM COATED ORAL at 03:59

## 2018-07-04 RX ADMIN — NICOTINE POLACRILEX 2 MG: 2 GUM, CHEWING ORAL at 06:01

## 2018-07-04 RX ADMIN — IBUPROFEN 800 MG: 200 TABLET, FILM COATED ORAL at 09:53

## 2018-07-04 RX ADMIN — MULTIPLE VITAMINS W/ MINERALS TAB 2 TABLET: TAB at 09:53

## 2018-07-04 RX ADMIN — INSULIN LISPRO 7 UNITS: 100 INJECTION, SOLUTION INTRAVENOUS; SUBCUTANEOUS at 21:14

## 2018-07-04 RX ADMIN — INSULIN LISPRO 3 UNITS: 100 INJECTION, SOLUTION INTRAVENOUS; SUBCUTANEOUS at 11:47

## 2018-07-04 RX ADMIN — INSULIN LISPRO 3 UNITS: 100 INJECTION, SOLUTION INTRAVENOUS; SUBCUTANEOUS at 06:04

## 2018-07-04 RX ADMIN — NICOTINE POLACRILEX 2 MG: 2 GUM, CHEWING ORAL at 21:12

## 2018-07-04 RX ADMIN — SIMVASTATIN 20 MG: 20 TABLET, FILM COATED ORAL at 21:11

## 2018-07-04 RX ADMIN — IBUPROFEN 800 MG: 200 TABLET, FILM COATED ORAL at 16:05

## 2018-07-04 RX ADMIN — VANCOMYCIN HYDROCHLORIDE 1100 MG: 5 INJECTION, POWDER, LYOPHILIZED, FOR SOLUTION INTRAVENOUS at 13:35

## 2018-07-04 RX ADMIN — NICOTINE 21 MG: 21 PATCH, EXTENDED RELEASE TRANSDERMAL at 05:57

## 2018-07-04 RX ADMIN — NICOTINE POLACRILEX 2 MG: 2 GUM, CHEWING ORAL at 15:08

## 2018-07-04 RX ADMIN — NICOTINE POLACRILEX 2 MG: 2 GUM, CHEWING ORAL at 09:53

## 2018-07-04 RX ADMIN — VANCOMYCIN HYDROCHLORIDE 1100 MG: 5 INJECTION, POWDER, LYOPHILIZED, FOR SOLUTION INTRAVENOUS at 01:54

## 2018-07-04 RX ADMIN — INSULIN LISPRO 7 UNITS: 100 INJECTION, SOLUTION INTRAVENOUS; SUBCUTANEOUS at 17:02

## 2018-07-04 ASSESSMENT — ENCOUNTER SYMPTOMS
STRIDOR: 0
HEARTBURN: 0
SPUTUM PRODUCTION: 0
CHILLS: 0
BLURRED VISION: 0
ABDOMINAL PAIN: 0
CONSTIPATION: 0
PHOTOPHOBIA: 0
SORE THROAT: 0
VOMITING: 0
NAUSEA: 0
CLAUDICATION: 0
NECK PAIN: 0
PND: 0
NERVOUS/ANXIOUS: 0
WEAKNESS: 1
HEMOPTYSIS: 0
BACK PAIN: 0
COUGH: 0
MEMORY LOSS: 0
EYE PAIN: 0
HEADACHES: 0
ORTHOPNEA: 0
MYALGIAS: 0
SHORTNESS OF BREATH: 0
INSOMNIA: 0
TREMORS: 0
BLOOD IN STOOL: 0
DEPRESSION: 0
HALLUCINATIONS: 0
DOUBLE VISION: 0
SPEECH CHANGE: 0
DIZZINESS: 0
SENSORY CHANGE: 0
TINGLING: 0
PALPITATIONS: 0
FEVER: 0

## 2018-07-04 ASSESSMENT — PAIN SCALES - GENERAL
PAINLEVEL_OUTOF10: 0
PAINLEVEL_OUTOF10: 7
PAINLEVEL_OUTOF10: 6
PAINLEVEL_OUTOF10: 8

## 2018-07-04 ASSESSMENT — LIFESTYLE VARIABLES: SUBSTANCE_ABUSE: 1

## 2018-07-04 NOTE — PROGRESS NOTES
Alert and oriented  to person,place and time.Foot splint and wound vac in place on left foot, on room air sat 97%. Complained of left foot  pain 6/10 at this time.Medication given per MAR. States understanding of POC.Call light at reach, a friend by the bedside.

## 2018-07-04 NOTE — PROGRESS NOTES
"[facility] Hospitalist Progress Note    Date of Service: 7/4/2018    Chief Complaint  48 y.o. female admitted 6/26/2018 with diabetic foot ulceration osteomyelitis and associated sepsis.     Interval Problem Update  Still refusing med and assessments. Still extremely belligerent to staff.    Persistently belligerent to all staff. Pt states \"I don't need your fucking help!\". Pt  swearing and states she is in too much pain to be polite. Pt asked staff to leave her alone.   ID stating- Anticipate 6 weeks abx-from date of surgery  Stop date 8/8/2018  Preprandial insulin increased to 10 and lantus increase to 30    7/3  No acute overnight events, patient more compliant today.   serosanguinous drainage noted in wound vac.  On IV antibiotics Continue with vancomycin stop date 8/8/2018  Wound care.  Repeat PT/OT eval today.    7/4  Patient comfortable today, in good mood. Patient denies fevers/chills, chest pain, shortness of breath or nausea/vommiting.   D/c Wound vac today per ortho recs.  Continue IV Vancomycin.  Vancomycin will be titrated to serum concentration levels to ensure adequate levels and reduce risk of toxicity  Wound care.    Consultants/Specialty  ortho  ID    Disposition  Placement [facility]?        Review of Systems   Constitutional: Positive for malaise/fatigue. Negative for chills and fever.   HENT: Negative for congestion, hearing loss, sore throat and tinnitus.    Eyes: Negative for blurred vision, double vision, photophobia and pain.   Respiratory: Negative for cough, hemoptysis, sputum production, shortness of breath and stridor.    Cardiovascular: Negative for chest pain, palpitations, orthopnea, claudication and PND.   Gastrointestinal: Negative for abdominal pain, blood in stool, constipation, heartburn, melena, nausea and vomiting.   Genitourinary: Negative for dysuria, frequency and urgency.   Musculoskeletal: Negative for back pain, myalgias and neck pain.        Reports foot pain   Skin: Negative for " itching and rash.   Neurological: Positive for weakness. Negative for dizziness, tingling, tremors, sensory change, speech change and headaches.   Psychiatric/Behavioral: Positive for substance abuse. Negative for depression, hallucinations, memory loss and suicidal ideas. The patient is not nervous/anxious and does not have insomnia.       Physical Exam  Laboratory/Imaging   Hemodynamics  Temp (24hrs), Av.7 °C (98.1 °F), Min:36.4 °C (97.5 °F), Max:36.9 °C (98.4 °F)   Temperature: 36.7 °C (98 °F)  Pulse  Av  Min: 64  Max: 125    Blood Pressure: 122/65      Respiratory      Respiration: 18, Pulse Oximetry: 93 %     Work Of Breathing / Effort: Mild  RUL Breath Sounds: Clear, RML Breath Sounds: Clear, RLL Breath Sounds: Diminished, SAUL Breath Sounds: Clear, LLL Breath Sounds: Diminished    Fluids    Intake/Output Summary (Last 24 hours) at 18 0745  Last data filed at 18 1800   Gross per 24 hour   Intake             1440 ml   Output                0 ml   Net             1440 ml       Nutrition  Orders Placed This Encounter   Procedures   • Diet Order Diabetic     Standing Status:   Standing     Number of Occurrences:   1     Order Specific Question:   Diet:     Answer:   Diabetic [3]     Physical Exam   Constitutional: She is oriented to person, place, and time. She appears well-developed and well-nourished. No distress.   Patient seen and examined  Discussed plan with RN   HENT:   Head: Normocephalic and atraumatic.   Right Ear: External ear normal.   Left Ear: External ear normal.   Mouth/Throat: Oropharynx is clear and moist. No oropharyngeal exudate.   Eyes: Conjunctivae are normal. Pupils are equal, round, and reactive to light. Right eye exhibits no discharge. Left eye exhibits no discharge. No scleral icterus.   Neck: Neck supple. No JVD present. No thyromegaly present.   Cardiovascular: Normal rate, regular rhythm, normal heart sounds and intact distal pulses.    No murmur heard.        Pulmonary/Chest: Effort normal and breath sounds normal. No stridor. No respiratory distress. She has no wheezes. She has no rales.   Abdominal: Soft. Bowel sounds are normal. She exhibits no distension. There is no tenderness. There is no rebound.   Musculoskeletal: Normal range of motion. She exhibits no edema or tenderness.   Small 5 mm right great toe base ulceration   Neurological: She is alert and oriented to person, place, and time. No cranial nerve deficit.   Skin: Skin is warm and dry. She is not diaphoretic. No erythema.   Psychiatric: Her behavior is normal. Judgment and thought content normal. Her mood appears not anxious.   Pleasant and calm   Nursing note and vitals reviewed.      Recent Labs      07/02/18   0555  07/03/18   0852  07/04/18   0400   WBC  13.3*  14.8*  13.6*   RBC  3.70*  3.82*  3.84*   HEMOGLOBIN  11.9*  12.3  12.1   HEMATOCRIT  34.9*  36.0*  36.1*   MCV  94.3  94.2  94.0   MCH  32.2  32.2  31.5   MCHC  34.1  34.2  33.5*   RDW  47.3  48.2  48.8   PLATELETCT  211  198  210   MPV  9.7  9.8  10.0     Recent Labs      07/02/18   0555  07/03/18   0852  07/04/18   0400   SODIUM  134*  136  136   POTASSIUM  4.8  4.1  4.3   CHLORIDE  104  105  106   CO2  24  25  24   GLUCOSE  181*  226*  126*   BUN  20  15  19   CREATININE  0.84  0.93  0.82   CALCIUM  8.5  8.5  8.1*                      Assessment/Plan     Bipolar I disorder with gwen ([facility])   Assessment & Plan    Noted to have erratic behavior , currently pleasant.   Refuses seroquel at this time.      Osteomyelitis ([facility])   Assessment & Plan    S/p OPERATION PERFORMED:  1.  Irrigation with excisional debridement, left diabetic foot ulcer.  2.  Left first ray amputation.  3.  Left gastrocnemius recession.  4.  Arthrotomy, second metatarsophalangeal joint.  5.  Placement of a negative pressure dressing of less than 50 square   centimeters.  Cont wound care.  D/c wound vac today.  Continue IV Vancomycin.        Diabetic neuropathy ([facility])    Assessment & Plan    Declines gabapentin, says she gets swelling in legs from this.         Bed bug bite   Assessment & Plan    Patient has numerous lesions on her lower extremity consistent with bedbug bites  No bugs noted here.   Patient is homeless        Sepsis ([facility])   Assessment & Plan    This is sepsis (without associated acute organ dysfunction).   2/2 DM foot infection with osteomyelitis  Bone cultures +Diphtheroids with osteo, s/p I&D.  Continue IV vancomycin  Vancomycin will be titrated to serum concentration levels to ensure adequate levels and reduce risk of toxicity  Active sepsis resolved.          Methadone dependence ([facility])   Assessment & Plan    Patient denies use of narcotics   Utox + Methadone.  Drug seeking behavior        Diabetic foot infection ([facility])   Assessment & Plan    Hx of left toe amputation.  Now has recurrent osteomyelitis.  s/p I&D and 1st toe amputation.          Type 2 diabetes mellitus with neurologic complication ([facility])   Assessment & Plan    Complicated by neuropathy and renal impairment from diabetes.  Cont lantus.  Continue Insulin-sliding scale, accu-checks and hypoglycemia protocol.  A1c:7.7          Morbid obesity ([facility])   Assessment & Plan    Recommend diet and exercise.           Quality-Core Measures   Reviewed items::  EKG reviewed, Labs reviewed, Medications reviewed and Radiology images reviewed  Hernandez catheter::  No Hernandez  DVT prophylaxis pharmacological::  Heparin  DVT prophylaxis - mechanical:  SCDs  Ulcer Prophylaxis::  Yes  Antibiotics:  Treating active infection/contamination beyond 24 hours perioperative coverage  Assessed for rehabilitation services:  Patient was assess for and/or received rehabilitation services during this hospitalization        The total time spent face to face with this patient was about 35 mins of which 60% of time was spent on counseling, review of records including pertinent lab data and studies, as well as discussing diagnostic  evaluation and work up, planned therapeutic interventions and future disposition of care. Where indicated, the assessment and plan reflect discussion of patient with consultants, other healthcare providers, family members, and additional research needed to obtain further information in formulating the plan of care of this patient. Patient also on high risk medication vancomycin, will closely monitor for toxicity

## 2018-07-04 NOTE — PROGRESS NOTES
Bedside report received from Napoleon WESTBROOK. Assumed care. POC discussed. Pt resting comfortably in bed. Safety precautions in place.

## 2018-07-04 NOTE — DISCHARGE PLANNING
Medical Social Work    Referral: [facility] referral     Intervention:  [facility] referral pending. This writer left a voicemail on main admissions phone line asking for an update    Plan: Wait [facility]'s decision if they can accept the pt

## 2018-07-04 NOTE — CARE PLAN
Problem: Communication  Goal: The ability to communicate needs accurately and effectively will improve  Outcome: PROGRESSING AS EXPECTED  POC discussed with pt. Pt compliant and cooperative today. Verbalizes understanding of wound care, pain management, mobility and d/c planning.    Problem: Discharge Barriers/Planning  Goal: Patient's continuum of care needs will be met  Outcome: PROGRESSING SLOWER THAN EXPECTED  All local SNF's declined pt. Awaiting approval from [facility] such as [facility]. SW involved.

## 2018-07-04 NOTE — PROGRESS NOTES
Progress Note               Author: Zan Engel Date & Time created: 7/3/2018  8:30 PM     Interval History:  Wound vac per wound team     Review of Systems:  ROS    Physical Exam:  Physical Exam discussed with wound team  - reports incision in good condition     Labs:        Invalid input(s): [id number]      Recent Labs      18   0555  18   0852   SODIUM  134*  136   POTASSIUM  4.8  4.1   CHLORIDE  104  105   CO2  24  25   BUN  20  15   CREATININE  0.84  0.93   CALCIUM  8.5  8.5     Recent Labs      18   0555  18   0852   ALTSGPT  22   --    ASTSGOT  21   --    ALKPHOSPHAT  47   --    TBILIRUBIN  0.6   --    GLUCOSE  181*  226*     Recent Labs      18   0555  18   0852   RBC  3.70*  3.82*   HEMOGLOBIN  11.9*  12.3   HEMATOCRIT  34.9*  36.0*   PLATELETCT  211  198     Recent Labs      18   0555  18   0852   WBC  13.3*  14.8*   NEUTSPOLYS  71.20  76.50*   LYMPHOCYTES  17.60*  14.30*   MONOCYTES  7.20  5.60   EOSINOPHILS  2.60  2.00   BASOPHILS  0.50  0.50   ASTSGOT  21   --    ALTSGPT  22   --    ALKPHOSPHAT  47   --    TBILIRUBIN  0.6   --      Hemodynamics:  Temp (24hrs), Av.7 °C (98.1 °F), Min:36.4 °C (97.5 °F), Max:36.9 °C (98.4 °F)  Temperature: 36.4 °C (97.5 °F)  Pulse  Av.7  Min: 64  Max: 125   Blood Pressure: 130/67     Respiratory:    Respiration: 18, Pulse Oximetry: 97 %     Work Of Breathing / Effort: Mild  RUL Breath Sounds: Clear, RML Breath Sounds: Clear, RLL Breath Sounds: Diminished, SAUL Breath Sounds: Clear, LLL Breath Sounds: Diminished  Fluids:    Intake/Output Summary (Last 24 hours) at 18  Last data filed at 18 1800   Gross per 24 hour   Intake             1440 ml   Output                0 ml   Net             1440 ml        GI/Nutrition:  Orders Placed This Encounter   Procedures   • Diet Order Diabetic     Standing Status:   Standing     Number of Occurrences:   1     Order Specific Question:   Diet:      Answer:   Diabetic [3]     Medical Decision Making, by Problem:  Active Hospital Problems    Diagnosis   • Diabetic neuropathy ([facility]) [E11.40]   • Osteomyelitis ([facility]) [M86.9]   • Bipolar I disorder with gwen ([facility]) [F31.10]   • Morbid obesity ([facility]) [E66.01]   • Type 2 diabetes mellitus with neurologic complication ([facility]) [E11.49]   • Diabetic foot infection ([facility]) [E11.628, L08.9]   • Methadone dependence ([facility]) [F11.20]   • Sepsis ([facility]) [A41.9]   • Bed bug bite [W57.XXXA]       Plan:foot osteo  - s/p amp and gastroc recession, wound closed and incisional wound vac placed  Wound care team likely to DC vac tomorrow  Plan for 6 weeks Abx per ID, stable, Awaiting placement    Quality-Core Measures

## 2018-07-04 NOTE — PROGRESS NOTES
Pt c/o pain in left foot 8/10, medicated per MAR. Pt sitting in bed, no s/s of distress. Foot elevated on pillows. PICC line running TKO after ABX. Pt provided with snacks and drinks throughout the day. Helped with repositioning and getting up to commode. No other needs, will monitor.

## 2018-07-04 NOTE — PROGRESS NOTES
Assessment completed. Due meds given. Pt AAOx4, cooperative and pleasant at this time. C/o pain in left foot 7/10. Medicated per MAR. Wound vac in place, continuous suction. Pt provided with drinks/snacks. Refusing to let PICC line run TKO. No other needs at this time. Will monitor.

## 2018-07-04 NOTE — PROGRESS NOTES
Pt sitting EOB for lunch. Insulin given per sliding scale for FSBS of 171. No other needs/complaints, will CTM.

## 2018-07-04 NOTE — PROGRESS NOTES
"Pharmacy Kinetics 48 y.o. female on vancomycin day # 5 2018    Currently on Vancomycin 1100 mg iv q12hr    Indication for Treatment: Left foot osteomyelitis     Pertinent history per medical record: Admitted on 2018 for worsening foot infection.  Post amputation of left great toe in 2017.  Xray and MRI revealed 1st and 2nd MTH osteomyelitis of the left foot.  S/P left 1st ray amputation , I&D yesterday. OR bone cultures- diptheroids.  has seen patient and anticipates 6 weeks of antibiotics from date of surgery .Stop date 2018     Estimated CrCl =  Estimated Creatinine Clearance: 95.5 mL/min (by C-G formula based on SCr of 0.93 mg/dL).     List concerns for renal function: Elevated BMI of 42.7  Zosyn discontinued on 18  Cultures: 18 Positive Lt. Foot Tissue/Bone - diptheroids  Allergies: Patient has no known allergies.   Recent Labs      18   0555  18   0852  18   0400   WBC  13.3*  14.8*  13.6*   NEUTSPOLYS  71.20  76.50*  73.60*     Recent Labs      18   0555  18   0852  18   0400   BUN  20  15  19   CREATININE  0.84  0.93  0.82   ALBUMIN  3.4   --   3.4     Recent Labs      18   1530  18   0852  18   1215   VANCOTROUGH  19.4  16.6  17.5     Intake/Output Summary (Last 24 hours) at 18 1255  Last data filed at 18 0900   Gross per 24 hour   Intake             1200 ml   Output                0 ml   Net             1200 ml      Blood pressure 122/65, pulse 72, temperature 36.7 °C (98 °F), resp. rate 18, height 1.676 m (5' 6\"), weight 120 kg (264 lb 8.8 oz), SpO2 93 %, not currently breastfeeding. Temp (24hrs), Av.7 °C (98 °F), Min:36.4 °C (97.5 °F), Max:36.9 °C (98.4 °F)      A/P   1. Vancomycin dose change: 900 mg q 12h  2. Next vancomycin level:  12:30  3. Goal trough: 15  4. Comments: Will continue to monitor and adjust dose as needed per protocol.    Evette Villagran    "

## 2018-07-05 LAB
ALBUMIN SERPL BCP-MCNC: 3.1 G/DL (ref 3.2–4.9)
ALBUMIN/GLOB SERPL: 1.1 G/DL
ALP SERPL-CCNC: 53 U/L (ref 30–99)
ALT SERPL-CCNC: 19 U/L (ref 2–50)
ANION GAP SERPL CALC-SCNC: 5 MMOL/L (ref 0–11.9)
AST SERPL-CCNC: 16 U/L (ref 12–45)
BASOPHILS # BLD AUTO: 0.5 % (ref 0–1.8)
BASOPHILS # BLD: 0.06 K/UL (ref 0–0.12)
BILIRUB SERPL-MCNC: 0.6 MG/DL (ref 0.1–1.5)
BUN SERPL-MCNC: 18 MG/DL (ref 8–22)
CALCIUM SERPL-MCNC: 7.9 MG/DL (ref 8.4–10.2)
CHLORIDE SERPL-SCNC: 105 MMOL/L (ref 96–112)
CO2 SERPL-SCNC: 24 MMOL/L (ref 20–33)
CREAT SERPL-MCNC: 0.83 MG/DL (ref 0.5–1.4)
EOSINOPHIL # BLD AUTO: 0.31 K/UL (ref 0–0.51)
EOSINOPHIL NFR BLD: 2.4 % (ref 0–6.9)
ERYTHROCYTE [DISTWIDTH] IN BLOOD BY AUTOMATED COUNT: 48.7 FL (ref 35.9–50)
GLOBULIN SER CALC-MCNC: 2.7 G/DL (ref 1.9–3.5)
GLUCOSE BLD-MCNC: 120 MG/DL (ref 65–99)
GLUCOSE BLD-MCNC: 147 MG/DL (ref 65–99)
GLUCOSE BLD-MCNC: 157 MG/DL (ref 65–99)
GLUCOSE BLD-MCNC: 192 MG/DL (ref 65–99)
GLUCOSE SERPL-MCNC: 241 MG/DL (ref 65–99)
HCT VFR BLD AUTO: 33.4 % (ref 37–47)
HGB BLD-MCNC: 11.4 G/DL (ref 12–16)
IMM GRANULOCYTES # BLD AUTO: 0.18 K/UL (ref 0–0.11)
IMM GRANULOCYTES NFR BLD AUTO: 1.4 % (ref 0–0.9)
LYMPHOCYTES # BLD AUTO: 2.27 K/UL (ref 1–4.8)
LYMPHOCYTES NFR BLD: 17.5 % (ref 22–41)
MAGNESIUM SERPL-MCNC: 1.8 MG/DL (ref 1.5–2.5)
MCH RBC QN AUTO: 32.5 PG (ref 27–33)
MCHC RBC AUTO-ENTMCNC: 34.1 G/DL (ref 33.6–35)
MCV RBC AUTO: 95.2 FL (ref 81.4–97.8)
MONOCYTES # BLD AUTO: 0.89 K/UL (ref 0–0.85)
MONOCYTES NFR BLD AUTO: 6.9 % (ref 0–13.4)
NEUTROPHILS # BLD AUTO: 9.28 K/UL (ref 2–7.15)
NEUTROPHILS NFR BLD: 71.3 % (ref 44–72)
NRBC # BLD AUTO: 0 K/UL
NRBC BLD-RTO: 0 /100 WBC
PLATELET # BLD AUTO: 190 K/UL (ref 164–446)
PMV BLD AUTO: 10.1 FL (ref 9–12.9)
POTASSIUM SERPL-SCNC: 4.3 MMOL/L (ref 3.6–5.5)
PROT SERPL-MCNC: 5.8 G/DL (ref 6–8.2)
RBC # BLD AUTO: 3.51 M/UL (ref 4.2–5.4)
SODIUM SERPL-SCNC: 134 MMOL/L (ref 135–145)
VANCOMYCIN TROUGH SERPL-MCNC: 12.4 UG/ML (ref 10–20)
WBC # BLD AUTO: 13 K/UL (ref 4.8–10.8)

## 2018-07-05 PROCEDURE — 80053 COMPREHEN METABOLIC PANEL: CPT

## 2018-07-05 PROCEDURE — 770006 HCHG ROOM/CARE - MED/SURG/GYN SEMI*

## 2018-07-05 PROCEDURE — A9270 NON-COVERED ITEM OR SERVICE: HCPCS | Performed by: HOSPITALIST

## 2018-07-05 PROCEDURE — 80202 ASSAY OF VANCOMYCIN: CPT

## 2018-07-05 PROCEDURE — 99233 SBSQ HOSP IP/OBS HIGH 50: CPT | Performed by: HOSPITALIST

## 2018-07-05 PROCEDURE — 83735 ASSAY OF MAGNESIUM: CPT

## 2018-07-05 PROCEDURE — 82962 GLUCOSE BLOOD TEST: CPT | Mod: 91

## 2018-07-05 PROCEDURE — 700102 HCHG RX REV CODE 250 W/ 637 OVERRIDE(OP): Performed by: INTERNAL MEDICINE

## 2018-07-05 PROCEDURE — A9270 NON-COVERED ITEM OR SERVICE: HCPCS | Performed by: INTERNAL MEDICINE

## 2018-07-05 PROCEDURE — 700105 HCHG RX REV CODE 258: Performed by: HOSPITALIST

## 2018-07-05 PROCEDURE — 85025 COMPLETE CBC W/AUTO DIFF WBC: CPT

## 2018-07-05 PROCEDURE — 700102 HCHG RX REV CODE 250 W/ 637 OVERRIDE(OP): Performed by: HOSPITALIST

## 2018-07-05 PROCEDURE — 700111 HCHG RX REV CODE 636 W/ 250 OVERRIDE (IP): Performed by: HOSPITALIST

## 2018-07-05 RX ADMIN — IBUPROFEN 800 MG: 200 TABLET, FILM COATED ORAL at 05:04

## 2018-07-05 RX ADMIN — NICOTINE POLACRILEX 2 MG: 2 GUM, CHEWING ORAL at 12:33

## 2018-07-05 RX ADMIN — ALTEPLASE 2 MG: 2.2 INJECTION, POWDER, LYOPHILIZED, FOR SOLUTION INTRAVENOUS at 14:13

## 2018-07-05 RX ADMIN — INSULIN LISPRO 3 UNITS: 100 INJECTION, SOLUTION INTRAVENOUS; SUBCUTANEOUS at 20:47

## 2018-07-05 RX ADMIN — IBUPROFEN 800 MG: 200 TABLET, FILM COATED ORAL at 11:55

## 2018-07-05 RX ADMIN — MULTIPLE VITAMINS W/ MINERALS TAB 2 TABLET: TAB at 08:38

## 2018-07-05 RX ADMIN — SIMVASTATIN 20 MG: 20 TABLET, FILM COATED ORAL at 20:53

## 2018-07-05 RX ADMIN — VANCOMYCIN HYDROCHLORIDE 900 MG: 5 INJECTION, POWDER, LYOPHILIZED, FOR SOLUTION INTRAVENOUS at 15:16

## 2018-07-05 RX ADMIN — NICOTINE 21 MG: 21 PATCH, EXTENDED RELEASE TRANSDERMAL at 05:05

## 2018-07-05 RX ADMIN — INSULIN LISPRO 3 UNITS: 100 INJECTION, SOLUTION INTRAVENOUS; SUBCUTANEOUS at 06:02

## 2018-07-05 RX ADMIN — VANCOMYCIN HYDROCHLORIDE 900 MG: 5 INJECTION, POWDER, LYOPHILIZED, FOR SOLUTION INTRAVENOUS at 00:41

## 2018-07-05 RX ADMIN — LISINOPRIL 10 MG: 5 TABLET ORAL at 08:38

## 2018-07-05 RX ADMIN — NICOTINE POLACRILEX 2 MG: 2 GUM, CHEWING ORAL at 03:53

## 2018-07-05 RX ADMIN — NICOTINE POLACRILEX 2 MG: 2 GUM, CHEWING ORAL at 15:39

## 2018-07-05 RX ADMIN — NICOTINE POLACRILEX 2 MG: 2 GUM, CHEWING ORAL at 20:58

## 2018-07-05 RX ADMIN — IBUPROFEN 800 MG: 200 TABLET, FILM COATED ORAL at 18:25

## 2018-07-05 ASSESSMENT — ENCOUNTER SYMPTOMS
DEPRESSION: 0
BLOOD IN STOOL: 0
SHORTNESS OF BREATH: 0
TREMORS: 0
HALLUCINATIONS: 0
STRIDOR: 0
BLURRED VISION: 0
MEMORY LOSS: 0
TINGLING: 0
BACK PAIN: 0
NAUSEA: 0
HEARTBURN: 0
EYE PAIN: 0
SORE THROAT: 0
COUGH: 0
MYALGIAS: 0
PND: 0
DOUBLE VISION: 0
DIZZINESS: 0
CLAUDICATION: 0
PHOTOPHOBIA: 0
SENSORY CHANGE: 0
SPEECH CHANGE: 0
CHILLS: 0
INSOMNIA: 0
SPUTUM PRODUCTION: 0
PALPITATIONS: 0
FEVER: 0
HEMOPTYSIS: 0
CONSTIPATION: 0
ABDOMINAL PAIN: 0
NERVOUS/ANXIOUS: 0
WEAKNESS: 1
HEADACHES: 0
ORTHOPNEA: 0
NECK PAIN: 0
VOMITING: 0

## 2018-07-05 ASSESSMENT — PATIENT HEALTH QUESTIONNAIRE - PHQ9
SUM OF ALL RESPONSES TO PHQ9 QUESTIONS 1 AND 2: 0
1. LITTLE INTEREST OR PLEASURE IN DOING THINGS: NOT AT ALL
SUM OF ALL RESPONSES TO PHQ9 QUESTIONS 1 AND 2: 0
1. LITTLE INTEREST OR PLEASURE IN DOING THINGS: NOT AT ALL
2. FEELING DOWN, DEPRESSED, IRRITABLE, OR HOPELESS: NOT AT ALL
2. FEELING DOWN, DEPRESSED, IRRITABLE, OR HOPELESS: NOT AT ALL

## 2018-07-05 ASSESSMENT — PAIN SCALES - GENERAL
PAINLEVEL_OUTOF10: 4
PAINLEVEL_OUTOF10: 0

## 2018-07-05 ASSESSMENT — LIFESTYLE VARIABLES: SUBSTANCE_ABUSE: 1

## 2018-07-05 NOTE — PROGRESS NOTES
"Pharmacy Kinetics 48 y.o. female on vancomycin day # 6 2018    Currently on Vancomycin 900 mg iv q12hr    Indication for Treatment: Left foot osteomyelitis     Pertinent history per medical record: Admitted on 2018 for worsening foot infection.  Post amputation of left great toe in 2017.  Xray and MRI revealed 1st and 2nd MTH osteomyelitis of the left foot.  S/P left 1st ray amputation, I&D yesterday. Dr Nelson from Infectious disease has been consulted..     Other antibiotics: Zosyn 4.5 gm every 8 hours as extended infusion     Allergies: Patient has no known allergies.      List concerns for renal function: Elevated BMI of 39.5; nephrotoxic drugs (Vancomycin and Zosyn combination)     Pertinent cultures to date:    L foot WND Diphtheroids   BCX2 NGTD       Recent Labs      18   0852  18   0400  18   0400   WBC  14.8*  13.6*  13.0*   NEUTSPOLYS  76.50*  73.60*  71.30     Recent Labs      18   0852  18   0400  18   0400   BUN  15  19  18   CREATININE  0.93  0.82  0.83   ALBUMIN   --   3.4  3.1*     Recent Labs      18   0852  18   1215  18   1505   VANCOTROUGH  16.6  17.5  12.4     Intake/Output Summary (Last 24 hours) at 18 1611  Last data filed at 18 0800   Gross per 24 hour   Intake              910 ml   Output                0 ml   Net              910 ml      Blood pressure (!) 167/103, pulse 84, temperature 36.4 °C (97.6 °F), resp. rate 18, height 1.676 m (5' 6\"), weight 120 kg (264 lb 8.8 oz), SpO2 99 %, not currently breastfeeding. Temp (24hrs), Av.8 °C (98.3 °F), Min:36.4 °C (97.6 °F), Max:37.1 °C (98.8 °F)      A/P   1. Vancomycin dose change: 1 GM q 12 h  2. Next vancomycin level:   3. Goal trough: 14:30  4. Comments: Will continue to monitor and adjust dose as needed per protocol.    Evette Villagran"

## 2018-07-05 NOTE — PROGRESS NOTES
Report received from night shift RN (Napoleon). Discussed plan of care, assumed care of patient. Safety measures in place.

## 2018-07-05 NOTE — PROGRESS NOTES
"[facility] Hospitalist Progress Note    Date of Service: 7/5/2018    Chief Complaint  48 y.o. female admitted 6/26/2018 with diabetic foot ulceration osteomyelitis and associated sepsis.     Interval Problem Update  Still refusing med and assessments. Still extremely belligerent to staff.    Persistently belligerent to all staff. Pt states \"I don't need your fucking help!\". Pt  swearing and states she is in too much pain to be polite. Pt asked staff to leave her alone.   ID stating- Anticipate 6 weeks abx-from date of surgery  Stop date 8/8/2018  Preprandial insulin increased to 10 and lantus increase to 30    7/3  No acute overnight events, patient more compliant today.   serosanguinous drainage noted in wound vac.  On IV antibiotics Continue with vancomycin stop date 8/8/2018  Wound care.  Repeat PT/OT eval today.    7/4  Patient comfortable today, in good mood. Patient denies fevers/chills, chest pain, shortness of breath or nausea/vommiting.   D/c Wound vac today per ortho recs.  Continue IV Vancomycin.  Vancomycin will be titrated to serum concentration levels to ensure adequate levels and reduce risk of toxicity  Wound care.    7/5  Patient comfortable, still having lower extremity discomfort. Otherwise Patient denies fevers/chills, chest pain, shortness of breath or nausea/vommiting.    Patient was caught smoking in her room, cigarettes bought by a friend. She was also able to ambulate without assistance.  Continue IV vancomycin  Vancomycin will be titrated to serum concentration levels to ensure adequate levels and reduce risk of toxicity  Continue wound care.    Consultants/Specialty  ortho  ID    Disposition  Placement [facility]?        Review of Systems   Constitutional: Positive for malaise/fatigue. Negative for chills and fever.   HENT: Negative for congestion, hearing loss, sore throat and tinnitus.    Eyes: Negative for blurred vision, double vision, photophobia and pain.   Respiratory: Negative for cough, " hemoptysis, sputum production, shortness of breath and stridor.    Cardiovascular: Negative for chest pain, palpitations, orthopnea, claudication and PND.   Gastrointestinal: Negative for abdominal pain, blood in stool, constipation, heartburn, melena, nausea and vomiting.   Genitourinary: Negative for dysuria, frequency and urgency.   Musculoskeletal: Negative for back pain, myalgias and neck pain.        Reports foot pain   Skin: Negative for itching and rash.   Neurological: Positive for weakness. Negative for dizziness, tingling, tremors, sensory change, speech change and headaches.   Psychiatric/Behavioral: Positive for substance abuse. Negative for depression, hallucinations, memory loss and suicidal ideas. The patient is not nervous/anxious and does not have insomnia.       Physical Exam  Laboratory/Imaging   Hemodynamics  Temp (24hrs), Av.9 °C (98.4 °F), Min:36.7 °C (98.1 °F), Max:37.1 °C (98.8 °F)   Temperature: 36.7 °C (98.1 °F)  Pulse  Av.8  Min: 64  Max: 125    Blood Pressure: (!) 163/77 (rn notified)      Respiratory      Respiration: 18, Pulse Oximetry: 97 %     Work Of Breathing / Effort: Mild  RUL Breath Sounds: Clear, RML Breath Sounds: Clear, RLL Breath Sounds: Diminished, SAUL Breath Sounds: Clear, LLL Breath Sounds: Diminished    Fluids    Intake/Output Summary (Last 24 hours) at 18 0920  Last data filed at 18 0800   Gross per 24 hour   Intake             1150 ml   Output                0 ml   Net             1150 ml       Nutrition  Orders Placed This Encounter   Procedures   • Diet Order Diabetic     Standing Status:   Standing     Number of Occurrences:   1     Order Specific Question:   Diet:     Answer:   Diabetic [3]     Physical Exam   Constitutional: She is oriented to person, place, and time. She appears well-developed and well-nourished. No distress.   Patient seen and examined  Discussed plan with RN   HENT:   Head: Normocephalic and atraumatic.   Right Ear:  External ear normal.   Left Ear: External ear normal.   Mouth/Throat: Oropharynx is clear and moist. No oropharyngeal exudate.   Eyes: Conjunctivae are normal. Pupils are equal, round, and reactive to light. Right eye exhibits no discharge. Left eye exhibits no discharge. No scleral icterus.   Neck: Neck supple. No JVD present. No thyromegaly present.   Cardiovascular: Normal rate, regular rhythm, normal heart sounds and intact distal pulses.    No murmur heard.       Pulmonary/Chest: Effort normal and breath sounds normal. No stridor. No respiratory distress. She has no wheezes. She has no rales.   Abdominal: Soft. Bowel sounds are normal. She exhibits no distension. There is no tenderness. There is no rebound.   Musculoskeletal: Normal range of motion. She exhibits no edema or tenderness.   Small 5 mm right great toe base ulceration   Neurological: She is alert and oriented to person, place, and time. No cranial nerve deficit.   Skin: Skin is warm and dry. She is not diaphoretic. No erythema.   Psychiatric: Her behavior is normal. Judgment and thought content normal. Her mood appears not anxious.   Pleasant and calm   Nursing note and vitals reviewed.      Recent Labs      07/03/18   0852  07/04/18   0400  07/05/18   0400   WBC  14.8*  13.6*  13.0*   RBC  3.82*  3.84*  3.51*   HEMOGLOBIN  12.3  12.1  11.4*   HEMATOCRIT  36.0*  36.1*  33.4*   MCV  94.2  94.0  95.2   MCH  32.2  31.5  32.5   MCHC  34.2  33.5*  34.1   RDW  48.2  48.8  48.7   PLATELETCT  198  210  190   MPV  9.8  10.0  10.1     Recent Labs      07/03/18   0852  07/04/18   0400  07/05/18   0400   SODIUM  136  136  134*   POTASSIUM  4.1  4.3  4.3   CHLORIDE  105  106  105   CO2  25  24  24   GLUCOSE  226*  126*  241*   BUN  15  19  18   CREATININE  0.93  0.82  0.83   CALCIUM  8.5  8.1*  7.9*                      Assessment/Plan     Bipolar I disorder with gwen (HCC)   Assessment & Plan    Noted to have erratic behavior , currently pleasant.   Refuses  seroquel at this time.      Osteomyelitis ([facility])   Assessment & Plan    S/p OPERATION PERFORMED:  1.  Irrigation with excisional debridement, left diabetic foot ulcer.  2.  Left first ray amputation.  3.  Left gastrocnemius recession.  4.  Arthrotomy, second metatarsophalangeal joint.  5.  Placement of a negative pressure dressing of less than 50 square   centimeters.  Continue wound care.  Continue IV Vancomycin.        Diabetic neuropathy ([facility])   Assessment & Plan    Declines gabapentin, says she gets swelling in legs from this.         Bed bug bite   Assessment & Plan    Patient has numerous lesions on her lower extremity consistent with bedbug bites  No bugs noted here.   Patient is homeless        Sepsis ([facility])   Assessment & Plan    This is sepsis (without associated acute organ dysfunction).   2/2 DM foot infection with osteomyelitis  Bone cultures +Diphtheroids with osteo, s/p I&D.  Continue IV vancomycin  Vancomycin will be titrated to serum concentration levels to ensure adequate levels and reduce risk of toxicity  Active sepsis resolved.          Methadone dependence ([facility])   Assessment & Plan    Patient denies use of narcotics   Utox + Methadone.  Drug seeking behavior        Diabetic foot infection ([facility])   Assessment & Plan    Hx of left toe amputation.  Now has recurrent osteomyelitis.  s/p I&D and 1st toe amputation.          Type 2 diabetes mellitus with neurologic complication ([facility])   Assessment & Plan    Complicated by neuropathy and renal impairment from diabetes.  Cont lantus.  Continue Insulin-sliding scale, accu-checks and hypoglycemia protocol.  A1c:7.7          Morbid obesity ([facility])   Assessment & Plan    Recommend diet and exercise.      Leukocytosis ([facility])  Most likely 2/2 to infection  Slowly improving  Continue to monitor.       Quality-Core Measures   Reviewed items::  EKG reviewed, Labs reviewed, Medications reviewed and Radiology images reviewed  Hernandez catheter::  No Hernandez  DVT prophylaxis  pharmacological::  Heparin  DVT prophylaxis - mechanical:  SCDs  Ulcer Prophylaxis::  Yes  Antibiotics:  Treating active infection/contamination beyond 24 hours perioperative coverage  Assessed for rehabilitation services:  Patient was assess for and/or received rehabilitation services during this hospitalization        The total time spent face to face with this patient was about 38 mins of which 60% of time was spent on counseling, review of records including pertinent lab data and studies, as well as discussing diagnostic evaluation and work up, planned therapeutic interventions and future disposition of care. Where indicated, the assessment and plan reflect discussion of patient with consultants, other healthcare providers, family members, and additional research needed to obtain further information in formulating the plan of care of this patient. Patient also on high risk medication vancomycin, will closely monitor for toxicity

## 2018-07-05 NOTE — DISCHARGE PLANNING
Agency/Facility Name: [facility]  Outcome: Patient declined due to no open beds for Medicaid.

## 2018-07-05 NOTE — PROGRESS NOTES
Sitting up on the bed, alert and oriented  to person,place and time.Left foot splint in place, on room air sat 96%. Denied pain at this time.  NCCD=215 ,medication given per MAR.States understanding of POC. Call light at reach.

## 2018-07-05 NOTE — DISCHARGE PLANNING
SW received voicemail from Will with [facility] stating that they are not going to be able to accept this patient's referral.

## 2018-07-05 NOTE — PROGRESS NOTES
"After reading PT note of the 2nd, BSC removed from bedside. Patient upset, \"I guess if you guys are gonna make me, I don't have a choice.\"  "

## 2018-07-05 NOTE — PROGRESS NOTES
Bedside report given to  Mer WESTBROOK.Pt  Resting in the bed.Safety precautions in place and all the lines remain patent.

## 2018-07-05 NOTE — DISCHARGE PLANNING
Per verbal request from NINO Hedrick.  Agency/Facility Name: [facility], [place], and [place]  Referral sent per Choice form at [phone].

## 2018-07-05 NOTE — CARE PLAN
Problem: Infection  Goal: Will remain free from infection  Outcome: PROGRESSING AS EXPECTED  Standard precaution in place. Educated  and encouraged regarding proper hand washing techniques.    Problem: Skin Integrity  Goal: Risk for impaired skin integrity will decrease  Outcome: PROGRESSING AS EXPECTED  Kept dry and clean. place pillows on bony prominences to prevent skin breakdown.

## 2018-07-05 NOTE — PROGRESS NOTES
Attempted to obtain lab draw from right PICC. Unable, apparently line is clotted. Order for cath-atul, placed in line. PICC dressing changed using sterile technique. Awaiting cath-atul

## 2018-07-05 NOTE — PROGRESS NOTES
Wound RN removed wound vac and changed dressing to left leg. Insulin given per MAR. No other changes in status.  Bedside report given to Napoleon WESTBROOK. POC discussed. Pt resting comfortably in bed. Safety precautions in place.

## 2018-07-05 NOTE — DISCHARGE PLANNING
RAYA spoke to Will regarding why the pt was denied.  He stated that it was due to the pt's psych history.     RAYA requested that CARRILLO Del Angel send referrals to all [place] SNFs.     Pt is currently receiving Q12 Vanco IV.  If pt can be brought down to Q24 IV, she may be appropriate for the [facility].  Pt will then go to the infusion center, or if MD feels that pt can administer her own IV abx, then  could be set up.      Per DARIANA Del Angel, pt is able to do her own ADL's independently, which is a criteria for the [facility].

## 2018-07-05 NOTE — PROGRESS NOTES
Walked by pt's room, pt noted to be up in bathroom, ambulated without assistance, smoke smell coming from room. Bedside RN and RN manager Caridad notified. Security called to confiscate smoking paraphernalia. Dr. COATES notified.

## 2018-07-05 NOTE — WOUND TEAM
"[facility]  Inpatient Services  Wound and Skin Care Progress Note    Admission Date: 6/26/18   HPI, PMH, SH: Reviewed  Unit where seen by Wound Team: [id number]    WOUND CONSULT RELATED TO: removal of VAC dressing left foot and assess if to continue    SUBJECTIVE:  \"I've been waiting all day for you to come\"    Self Report / Pain Level: predmedicated but patient tearful with drape removal/dressing removal and cried on and off about how her foot looks since surgery    OBJECTIVE:   VAC intact with scant, old drainage in cannister; dressing 3/4 off from medial LLE incision and this was changed;see below     WOUND TYPE, LOCATION, CHARACTERISTICS (Pressure ulcers: location, stage, POA or date identified)    Wound Type/Location:  Surgical incision medial left foot    Periwound: mildly macerated, intact    Drainage:  none     Tissue Type and %:   Well approximated incision with sutures and discoloration along edges   Wound Edges:  Well approximated with ~0.1cm red granular tissue distally  Odor:  none     Exposed structure(s):  none   S&S of Infection:  none    Measurements:  (taken 7/4/18)    Length: 8.5cm   Width:  0.3cm   Depth:  0cm   Tracts/undermining:  none     INTERVENTIONS BY WOUND TEAM:  Removed patient's boot noting that medial LLE dressing very loose.  This was removed noted well approximated incision with staples intact, no drainage.  Replaced xeroform dressing over incision and covered with dry gauze securing with tape.  Remove VAC dressing left foot with patient crying on and off.  After dressing removed she reported OK but then continued to cry on and off when looking at her foot and asking about footwear.  Cleansed incision with NS gauze and pat dry.  Measurements and photo taken.  Covered wound with strip silver hydrofiber and dry gauze securing with tape.  Discussed with staff RN.  Jonah KLEIN ortho MD office tomorrow with report of appearance of incision.    Interdisciplinary consultation: " staff RN, patient, Dr. Merlos office tomorrow    EVALUATION:  Some discoloration at proximal end of incision with well approximated with sutures; mild dehiscence distally with red tissue.  Silver hydrofiber should assist with maintaining dry and antimicrobial environment     Factors affecting wound healing:  DM, history of non compliance  Goals:  Incision to remain approximated and discoloration to dissipate     NURSING PLAN OF CARE ORDERS (X):    Dressing changes: See Dressing Maintenance orders:  X updated  Skin care: See Skin Care orders:   Rectal tube care: See Rectal Tube Care orders:    Other orders:    WOUND TEAM PLAN OF CARE (X):    NPWT change 3 x week:         Dressing changes by wound team:       Follow up as needed:   X next week     Other (explain):    Anticipated discharge plans (X):  SNF:           Home Care:           Outpatient Wound Center:            Self Care:            Other:    TBD

## 2018-07-05 NOTE — CARE PLAN
Problem: Knowledge Deficit  Goal: Knowledge of the prescribed therapeutic regimen will improve    Intervention: Discuss information regarding therpeutic regimen and document in education  Discussed plan of care.      Problem: Psychosocial Needs:  Goal: Level of anxiety will decrease    Intervention: Collaborate with Interdisciplinary Team including Psychologist/Behavioral Health Team  Discussed with patient anxiety, and issues affecting discharge.

## 2018-07-06 LAB
ALBUMIN SERPL BCP-MCNC: 3.6 G/DL (ref 3.2–4.9)
ALBUMIN/GLOB SERPL: 1.2 G/DL
ALP SERPL-CCNC: 59 U/L (ref 30–99)
ALT SERPL-CCNC: 20 U/L (ref 2–50)
ANION GAP SERPL CALC-SCNC: 8 MMOL/L (ref 0–11.9)
AST SERPL-CCNC: 21 U/L (ref 12–45)
BASOPHILS # BLD AUTO: 0.5 % (ref 0–1.8)
BASOPHILS # BLD: 0.08 K/UL (ref 0–0.12)
BILIRUB SERPL-MCNC: 0.8 MG/DL (ref 0.1–1.5)
BUN SERPL-MCNC: 14 MG/DL (ref 8–22)
CALCIUM SERPL-MCNC: 8.2 MG/DL (ref 8.4–10.2)
CHLORIDE SERPL-SCNC: 103 MMOL/L (ref 96–112)
CO2 SERPL-SCNC: 20 MMOL/L (ref 20–33)
COMMENT 1642: NORMAL
CREAT SERPL-MCNC: 0.78 MG/DL (ref 0.5–1.4)
EOSINOPHIL # BLD AUTO: 0.28 K/UL (ref 0–0.51)
EOSINOPHIL NFR BLD: 1.8 % (ref 0–6.9)
ERYTHROCYTE [DISTWIDTH] IN BLOOD BY AUTOMATED COUNT: 47.1 FL (ref 35.9–50)
GLOBULIN SER CALC-MCNC: 3.1 G/DL (ref 1.9–3.5)
GLUCOSE BLD-MCNC: 124 MG/DL (ref 65–99)
GLUCOSE BLD-MCNC: 152 MG/DL (ref 65–99)
GLUCOSE BLD-MCNC: 176 MG/DL (ref 65–99)
GLUCOSE BLD-MCNC: 189 MG/DL (ref 65–99)
GLUCOSE SERPL-MCNC: 158 MG/DL (ref 65–99)
HCT VFR BLD AUTO: 40.2 % (ref 37–47)
HGB BLD-MCNC: 13.7 G/DL (ref 12–16)
IMM GRANULOCYTES # BLD AUTO: 0.2 K/UL (ref 0–0.11)
IMM GRANULOCYTES NFR BLD AUTO: 1.3 % (ref 0–0.9)
LYMPHOCYTES # BLD AUTO: 2.6 K/UL (ref 1–4.8)
LYMPHOCYTES NFR BLD: 16.6 % (ref 22–41)
MAGNESIUM SERPL-MCNC: 1.8 MG/DL (ref 1.5–2.5)
MCH RBC QN AUTO: 31.6 PG (ref 27–33)
MCHC RBC AUTO-ENTMCNC: 34.1 G/DL (ref 33.6–35)
MCV RBC AUTO: 92.6 FL (ref 81.4–97.8)
MONOCYTES # BLD AUTO: 1.07 K/UL (ref 0–0.85)
MONOCYTES NFR BLD AUTO: 6.8 % (ref 0–13.4)
NEUTROPHILS # BLD AUTO: 11.47 K/UL (ref 2–7.15)
NEUTROPHILS NFR BLD: 73 % (ref 44–72)
NRBC # BLD AUTO: 0 K/UL
NRBC BLD-RTO: 0 /100 WBC
PLATELET # BLD AUTO: 179 K/UL (ref 164–446)
PMV BLD AUTO: 10.6 FL (ref 9–12.9)
POTASSIUM SERPL-SCNC: 4 MMOL/L (ref 3.6–5.5)
PROT SERPL-MCNC: 6.7 G/DL (ref 6–8.2)
RBC # BLD AUTO: 4.34 M/UL (ref 4.2–5.4)
SODIUM SERPL-SCNC: 131 MMOL/L (ref 135–145)
VANCOMYCIN TROUGH SERPL-MCNC: 12.1 UG/ML (ref 10–20)
WBC # BLD AUTO: 15.7 K/UL (ref 4.8–10.8)

## 2018-07-06 PROCEDURE — G8979 MOBILITY GOAL STATUS: HCPCS | Mod: CJ

## 2018-07-06 PROCEDURE — 82962 GLUCOSE BLOOD TEST: CPT

## 2018-07-06 PROCEDURE — 700102 HCHG RX REV CODE 250 W/ 637 OVERRIDE(OP): Performed by: HOSPITALIST

## 2018-07-06 PROCEDURE — 97164 PT RE-EVAL EST PLAN CARE: CPT

## 2018-07-06 PROCEDURE — 83735 ASSAY OF MAGNESIUM: CPT

## 2018-07-06 PROCEDURE — 770006 HCHG ROOM/CARE - MED/SURG/GYN SEMI*

## 2018-07-06 PROCEDURE — 700102 HCHG RX REV CODE 250 W/ 637 OVERRIDE(OP): Performed by: INTERNAL MEDICINE

## 2018-07-06 PROCEDURE — A9270 NON-COVERED ITEM OR SERVICE: HCPCS | Performed by: HOSPITALIST

## 2018-07-06 PROCEDURE — 99233 SBSQ HOSP IP/OBS HIGH 50: CPT | Performed by: HOSPITALIST

## 2018-07-06 PROCEDURE — A9270 NON-COVERED ITEM OR SERVICE: HCPCS | Performed by: INTERNAL MEDICINE

## 2018-07-06 PROCEDURE — 700111 HCHG RX REV CODE 636 W/ 250 OVERRIDE (IP): Performed by: HOSPITALIST

## 2018-07-06 PROCEDURE — 80053 COMPREHEN METABOLIC PANEL: CPT

## 2018-07-06 PROCEDURE — 85025 COMPLETE CBC W/AUTO DIFF WBC: CPT

## 2018-07-06 PROCEDURE — 80202 ASSAY OF VANCOMYCIN: CPT

## 2018-07-06 PROCEDURE — G8978 MOBILITY CURRENT STATUS: HCPCS | Mod: CL

## 2018-07-06 PROCEDURE — 700105 HCHG RX REV CODE 258: Performed by: HOSPITALIST

## 2018-07-06 RX ORDER — LISINOPRIL 20 MG/1
20 TABLET ORAL DAILY
Status: DISCONTINUED | OUTPATIENT
Start: 2018-07-07 | End: 2018-07-10 | Stop reason: HOSPADM

## 2018-07-06 RX ORDER — LABETALOL HYDROCHLORIDE 5 MG/ML
10 INJECTION, SOLUTION INTRAVENOUS EVERY 6 HOURS PRN
Status: DISCONTINUED | OUTPATIENT
Start: 2018-07-06 | End: 2018-07-10 | Stop reason: HOSPADM

## 2018-07-06 RX ORDER — ALPRAZOLAM 0.25 MG/1
0.25 TABLET ORAL 2 TIMES DAILY PRN
Status: DISCONTINUED | OUTPATIENT
Start: 2018-07-06 | End: 2018-07-10 | Stop reason: HOSPADM

## 2018-07-06 RX ADMIN — VANCOMYCIN HYDROCHLORIDE 1000 MG: 1 INJECTION, POWDER, LYOPHILIZED, FOR SOLUTION INTRAVENOUS at 03:39

## 2018-07-06 RX ADMIN — INSULIN GLARGINE 30 UNITS: 100 INJECTION, SOLUTION SUBCUTANEOUS at 20:35

## 2018-07-06 RX ADMIN — INSULIN LISPRO 3 UNITS: 100 INJECTION, SOLUTION INTRAVENOUS; SUBCUTANEOUS at 11:32

## 2018-07-06 RX ADMIN — MULTIPLE VITAMINS W/ MINERALS TAB 2 TABLET: TAB at 08:38

## 2018-07-06 RX ADMIN — NICOTINE 21 MG: 21 PATCH, EXTENDED RELEASE TRANSDERMAL at 05:29

## 2018-07-06 RX ADMIN — ALPRAZOLAM 0.25 MG: 0.25 TABLET ORAL at 22:59

## 2018-07-06 RX ADMIN — LISINOPRIL 10 MG: 5 TABLET ORAL at 08:37

## 2018-07-06 RX ADMIN — NICOTINE 21 MG: 21 PATCH, EXTENDED RELEASE TRANSDERMAL at 08:57

## 2018-07-06 RX ADMIN — IBUPROFEN 800 MG: 200 TABLET, FILM COATED ORAL at 08:38

## 2018-07-06 RX ADMIN — VANCOMYCIN HYDROCHLORIDE 1100 MG: 5 INJECTION, POWDER, LYOPHILIZED, FOR SOLUTION INTRAVENOUS at 15:29

## 2018-07-06 RX ADMIN — ALPRAZOLAM 0.25 MG: 0.25 TABLET ORAL at 13:54

## 2018-07-06 RX ADMIN — IBUPROFEN 800 MG: 200 TABLET, FILM COATED ORAL at 00:23

## 2018-07-06 RX ADMIN — NICOTINE POLACRILEX 2 MG: 2 GUM, CHEWING ORAL at 08:42

## 2018-07-06 RX ADMIN — INSULIN LISPRO 3 UNITS: 100 INJECTION, SOLUTION INTRAVENOUS; SUBCUTANEOUS at 20:36

## 2018-07-06 RX ADMIN — NICOTINE POLACRILEX 2 MG: 2 GUM, CHEWING ORAL at 11:37

## 2018-07-06 RX ADMIN — SIMVASTATIN 20 MG: 20 TABLET, FILM COATED ORAL at 20:29

## 2018-07-06 RX ADMIN — NICOTINE POLACRILEX 2 MG: 2 GUM, CHEWING ORAL at 18:01

## 2018-07-06 RX ADMIN — NICOTINE POLACRILEX 2 MG: 2 GUM, CHEWING ORAL at 20:29

## 2018-07-06 RX ADMIN — NICOTINE POLACRILEX 2 MG: 2 GUM, CHEWING ORAL at 03:39

## 2018-07-06 RX ADMIN — INSULIN LISPRO 3 UNITS: 100 INJECTION, SOLUTION INTRAVENOUS; SUBCUTANEOUS at 05:40

## 2018-07-06 ASSESSMENT — ENCOUNTER SYMPTOMS
PHOTOPHOBIA: 0
TINGLING: 0
FEVER: 0
VOMITING: 0
COUGH: 0
NECK PAIN: 0
NAUSEA: 0
SENSORY CHANGE: 0
MEMORY LOSS: 0
ABDOMINAL PAIN: 0
CONSTIPATION: 0
DIZZINESS: 0
HALLUCINATIONS: 0
HEMOPTYSIS: 0
EYE PAIN: 0
NERVOUS/ANXIOUS: 0
MYALGIAS: 0
HEARTBURN: 0
WEAKNESS: 1
DEPRESSION: 0
CLAUDICATION: 0
PALPITATIONS: 0
SPEECH CHANGE: 0
BLOOD IN STOOL: 0
ORTHOPNEA: 0
TREMORS: 0
HEADACHES: 0
INSOMNIA: 0
SORE THROAT: 0
PND: 0
SHORTNESS OF BREATH: 0
BACK PAIN: 0
BLURRED VISION: 0
CHILLS: 0
STRIDOR: 0
SPUTUM PRODUCTION: 0
DOUBLE VISION: 0

## 2018-07-06 ASSESSMENT — PATIENT HEALTH QUESTIONNAIRE - PHQ9
2. FEELING DOWN, DEPRESSED, IRRITABLE, OR HOPELESS: NOT AT ALL
1. LITTLE INTEREST OR PLEASURE IN DOING THINGS: NOT AT ALL
SUM OF ALL RESPONSES TO PHQ9 QUESTIONS 1 AND 2: 0

## 2018-07-06 ASSESSMENT — PAIN SCALES - GENERAL
PAINLEVEL_OUTOF10: 2
PAINLEVEL_OUTOF10: 3
PAINLEVEL_OUTOF10: 2
PAINLEVEL_OUTOF10: 5

## 2018-07-06 ASSESSMENT — LIFESTYLE VARIABLES: SUBSTANCE_ABUSE: 1

## 2018-07-06 NOTE — CARE PLAN
Problem: Infection  Goal: Will remain free from infection  Outcome: PROGRESSING AS EXPECTED  Discussed infection prevention and signs/symptoms of infection. Pt verbalized understanding.     Problem: Knowledge Deficit  Goal: Knowledge of disease process/condition, treatment plan, diagnostic tests, and medications will improve  Outcome: PROGRESSING AS EXPECTED  Discussed plan of care with patient. Pt verbalized questions and all questions answered.

## 2018-07-06 NOTE — PROGRESS NOTES
MD notified that pt is anxious and xanax ordered per MAR. MD also notified that pt has been hypertensive today and he will follow up.

## 2018-07-06 NOTE — CARE PLAN
Problem: Respiratory:  Goal: Respiratory status will improve  Outcome: PROGRESSING AS EXPECTED  Pt counseled on quitting smoking and is using nicotine therapy currently

## 2018-07-06 NOTE — PROGRESS NOTES
"[facility] Hospitalist Progress Note    Date of Service: 7/6/2018    Chief Complaint  48 y.o. female admitted 6/26/2018 with diabetic foot ulceration osteomyelitis and associated sepsis.     Interval Problem Update  Still refusing med and assessments. Still extremely belligerent to staff.    Persistently belligerent to all staff. Pt states \"I don't need your fucking help!\". Pt  swearing and states she is in too much pain to be polite. Pt asked staff to leave her alone.   ID stating- Anticipate 6 weeks abx-from date of surgery  Stop date 8/8/2018  Preprandial insulin increased to 10 and lantus increase to 30    7/3  No acute overnight events, patient more compliant today.   serosanguinous drainage noted in wound vac.  On IV antibiotics Continue with vancomycin stop date 8/8/2018  Wound care.  Repeat PT/OT eval today.    7/4  Patient comfortable today, in good mood. Patient denies fevers/chills, chest pain, shortness of breath or nausea/vommiting.   D/c Wound vac today per ortho recs.  Continue IV Vancomycin.  Vancomycin will be titrated to serum concentration levels to ensure adequate levels and reduce risk of toxicity  Wound care.    7/5  Patient comfortable, still having lower extremity discomfort. Otherwise Patient denies fevers/chills, chest pain, shortness of breath or nausea/vommiting.    Patient was caught smoking in her room, cigarettes bought by a friend. She was also able to ambulate without assistance.  Continue IV vancomycin  Vancomycin will be titrated to serum concentration levels to ensure adequate levels and reduce risk of toxicity  Continue wound care.    7/6  No acute issues overnight, patient calm and nice.  Continue IV Vancomycin.  Vancomycin will be titrated to serum concentration levels to ensure adequate levels and reduce risk of toxicity  Awaiting for placement.    Consultants/Specialty  ortho  ID    Disposition  Placement TPH?        Review of Systems   Constitutional: Positive for " malaise/fatigue. Negative for chills and fever.   HENT: Negative for congestion, hearing loss, sore throat and tinnitus.    Eyes: Negative for blurred vision, double vision, photophobia and pain.   Respiratory: Negative for cough, hemoptysis, sputum production, shortness of breath and stridor.    Cardiovascular: Negative for chest pain, palpitations, orthopnea, claudication and PND.   Gastrointestinal: Negative for abdominal pain, blood in stool, constipation, heartburn, melena, nausea and vomiting.   Genitourinary: Negative for dysuria, frequency and urgency.   Musculoskeletal: Negative for back pain, myalgias and neck pain.        Reports foot pain   Skin: Negative for itching and rash.   Neurological: Positive for weakness. Negative for dizziness, tingling, tremors, sensory change, speech change and headaches.   Psychiatric/Behavioral: Positive for substance abuse. Negative for depression, hallucinations, memory loss and suicidal ideas. The patient is not nervous/anxious and does not have insomnia.       Physical Exam  Laboratory/Imaging   Hemodynamics  Temp (24hrs), Av.8 °C (98.2 °F), Min:36.4 °C (97.6 °F), Max:37.1 °C (98.7 °F)   Temperature: 37.1 °C (98.7 °F)  Pulse  Av.7  Min: 64  Max: 125    Blood Pressure: (!) 179/96 (rn notified)      Respiratory      Respiration: 18, Pulse Oximetry: 98 %     Work Of Breathing / Effort: Mild  RUL Breath Sounds: Clear, RML Breath Sounds: Clear, RLL Breath Sounds: Diminished, SAUL Breath Sounds: Clear, LLL Breath Sounds: Diminished    Fluids    Intake/Output Summary (Last 24 hours) at 18 0855  Last data filed at 18 0600   Gross per 24 hour   Intake              250 ml   Output                0 ml   Net              250 ml       Nutrition  Orders Placed This Encounter   Procedures   • Diet Order Diabetic     Standing Status:   Standing     Number of Occurrences:   1     Order Specific Question:   Diet:     Answer:   Diabetic [3]     Physical Exam    Constitutional: She is oriented to person, place, and time. She appears well-developed and well-nourished. No distress.   Patient seen and examined  Discussed plan with DARIANA MCCANN:   Head: Normocephalic and atraumatic.   Right Ear: External ear normal.   Left Ear: External ear normal.   Mouth/Throat: Oropharynx is clear and moist. No oropharyngeal exudate.   Eyes: Conjunctivae are normal. Pupils are equal, round, and reactive to light. Right eye exhibits no discharge. Left eye exhibits no discharge. No scleral icterus.   Neck: Neck supple. No JVD present. No thyromegaly present.   Cardiovascular: Normal rate, regular rhythm, normal heart sounds and intact distal pulses.    No murmur heard.       Pulmonary/Chest: Effort normal and breath sounds normal. No stridor. No respiratory distress. She has no wheezes. She has no rales.   Abdominal: Soft. Bowel sounds are normal. She exhibits no distension. There is no tenderness. There is no rebound.   Musculoskeletal: Normal range of motion. She exhibits no edema or tenderness.   Small 5 mm right great toe base ulceration   Neurological: She is alert and oriented to person, place, and time. No cranial nerve deficit.   Skin: Skin is warm and dry. She is not diaphoretic. No erythema.   Psychiatric: Her behavior is normal. Judgment and thought content normal. Her mood appears not anxious.   Pleasant and calm   Nursing note and vitals reviewed.      Recent Labs      07/04/18   0400  07/05/18   0400   WBC  13.6*  13.0*   RBC  3.84*  3.51*   HEMOGLOBIN  12.1  11.4*   HEMATOCRIT  36.1*  33.4*   MCV  94.0  95.2   MCH  31.5  32.5   MCHC  33.5*  34.1   RDW  48.8  48.7   PLATELETCT  210  190   MPV  10.0  10.1     Recent Labs      07/04/18   0400  07/05/18   0400  07/06/18   0340   SODIUM  136  134*  131*   POTASSIUM  4.3  4.3  4.0   CHLORIDE  106  105  103   CO2  24  24  20   GLUCOSE  126*  241*  158*   BUN  19  18  14   CREATININE  0.82  0.83  0.78   CALCIUM  8.1*  7.9*  8.2*                       Assessment/Plan     Bipolar I disorder with gwen ([facility])   Assessment & Plan    Noted to have erratic behavior , currently pleasant.   Refuses seroquel at this time.      Osteomyelitis ([facility])   Assessment & Plan    S/p OPERATION PERFORMED:  1.  Irrigation with excisional debridement, left diabetic foot ulcer.  2.  Left first ray amputation.  3.  Left gastrocnemius recession.  4.  Arthrotomy, second metatarsophalangeal joint.  5.  Placement of a negative pressure dressing of less than 50 square   centimeters.  Continue wound care.  Continue IV Vancomycin.  Vancomycin will be titrated to serum concentration levels to ensure adequate levels and reduce risk of toxicity          Diabetic neuropathy ([facility])   Assessment & Plan    Declines gabapentin, says she gets swelling in legs from this.         Bed bug bite   Assessment & Plan    Patient has numerous lesions on her lower extremity consistent with bedbug bites  No bugs noted here.   Patient is homeless        Sepsis ([facility])   Assessment & Plan    This is sepsis (without associated acute organ dysfunction).   2/2 DM foot infection with osteomyelitis  Bone cultures +Diphtheroids with osteo, s/p I&D.  Continue IV vancomycin  Vancomycin will be titrated to serum concentration levels to ensure adequate levels and reduce risk of toxicity  Active sepsis resolved.          Methadone dependence ([facility])   Assessment & Plan    Patient denies use of narcotics   Utox + Methadone.  Drug seeking behavior        Diabetic foot infection ([facility])   Assessment & Plan    Hx of left toe amputation.  Now has recurrent osteomyelitis.  s/p I&D and 1st toe amputation.          Type 2 diabetes mellitus with neurologic complication ([facility])   Assessment & Plan    Complicated by neuropathy and renal impairment from diabetes.  Cont lantus.  Continue Insulin-sliding scale, accu-checks and hypoglycemia protocol.  A1c:7.7          Morbid obesity ([facility])   Assessment & Plan    Recommend diet and  exercise.      Leukocytosis (HCC)  Most likely 2/2 to infection  Slowly improving  Pending cbc today.    Uncontrolled Hypertension (HCC)  Continue Lisinopril, increased 10mg to 20mg  PRN IV Labetalol if sbp>170         Quality-Core Measures   Reviewed items::  EKG reviewed, Labs reviewed, Medications reviewed and Radiology images reviewed  Hernandez catheter::  No Hernandez  DVT prophylaxis pharmacological::  Heparin  DVT prophylaxis - mechanical:  SCDs  Ulcer Prophylaxis::  Yes  Antibiotics:  Treating active infection/contamination beyond 24 hours perioperative coverage  Assessed for rehabilitation services:  Patient was assess for and/or received rehabilitation services during this hospitalization        The total time spent face to face with this patient was about 38 mins of which 60% of time was spent on counseling, review of records including pertinent lab data and studies, as well as discussing diagnostic evaluation and work up, planned therapeutic interventions and future disposition of care. Where indicated, the assessment and plan reflect discussion of patient with consultants, other healthcare providers, family members, and additional research needed to obtain further information in formulating the plan of care of this patient. Patient also on high risk medication vancomycin, will closely monitor for toxicity

## 2018-07-06 NOTE — PROGRESS NOTES
Received report from Mer WESTBROOK. Discussed plan of care and safety measures in place. No further needs at this time.

## 2018-07-06 NOTE — CARE PLAN
Problem: Bowel/Gastric:  Goal: Normal bowel function is maintained or improved  Outcome: PROGRESSING AS EXPECTED  Pt states she had a BM today

## 2018-07-06 NOTE — PROGRESS NOTES
MD notified of patient's elevated HR and blood pressure despite xanax. MD will evaluate and order medications as necessary. Will continue to monitor.

## 2018-07-06 NOTE — PROGRESS NOTES
"Pharmacy Kinetics 48 y.o. female on vancomycin day # 7 2018    Currently on Vancomycin 1000 mg iv q12hr    Indication for Treatment: Left foot osteomyelitis    Pertinent history per medical record: Admitted on 2018 for Admitted on 2018 for worsening foot infection.  Post amputation of left great toe in 2017.  Xray and MRI revealed 1st and 2nd MTH osteomyelitis of the left foot.  S/P left 1st ray amputation, I&D yesterday.  Dr Nelson from infectious disease has been consulted.    Other antibiotics: None    Allergies: Patient has no known allergies.     List concerns for renal function:  Obesity (BMI 39.5), nephrotoxic drugs (Vancomycin)    Pertinent cultures to date:    L foot WND diphtheroids prelim   L foot bone diptheroids prelim    Recent Labs      18   0400  18   0400  18   0340   WBC  13.6*  13.0*  15.7*   NEUTSPOLYS  73.60*  71.30  73.00*     Recent Labs      18   0400  18   0400  18   0340   BUN  19  18  14   CREATININE  0.82  0.83  0.78   ALBUMIN  3.4  3.1*  3.6     Recent Labs      18   1215  18   1505  18   1420   VANCOTROUGH  17.5  12.4  12.1     Intake/Output Summary (Last 24 hours) at 18 1635  Last data filed at 18 1400   Gross per 24 hour   Intake              610 ml   Output                3 ml   Net              607 ml      Blood pressure (!) 170/83, pulse (!) 105, temperature 36.9 °C (98.5 °F), resp. rate 18, height 1.676 m (5' 6\"), weight 120 kg (264 lb 8.8 oz), SpO2 97 %, not currently breastfeeding. Temp (24hrs), Av.9 °C (98.5 °F), Min:36.9 °C (98.4 °F), Max:37.1 °C (98.7 °F)      A/P   1. Vancomycin 1 gm ivpb q12hrs gave trough=12.1 today  2. Will increase dose to 1100mg vancomycin q12hrs for goal trough of 15 for osteomyelitis.  3. Comments: Will continue to follow per protocol.    JORDON PUGH  "

## 2018-07-06 NOTE — DISCHARGE PLANNING
Agency/Facility Name: [facility]  Outcome: Admission is out until Monday. [facility] will follow up on 7/10.  LSW Floridalma notified.

## 2018-07-07 LAB
ALBUMIN SERPL BCP-MCNC: 3.2 G/DL (ref 3.2–4.9)
ALBUMIN/GLOB SERPL: 1.2 G/DL
ALP SERPL-CCNC: 60 U/L (ref 30–99)
ALT SERPL-CCNC: 20 U/L (ref 2–50)
ANION GAP SERPL CALC-SCNC: 8 MMOL/L (ref 0–11.9)
AST SERPL-CCNC: 24 U/L (ref 12–45)
BASOPHILS # BLD AUTO: 0.6 % (ref 0–1.8)
BASOPHILS # BLD: 0.08 K/UL (ref 0–0.12)
BILIRUB SERPL-MCNC: 0.6 MG/DL (ref 0.1–1.5)
BUN SERPL-MCNC: 20 MG/DL (ref 8–22)
CALCIUM SERPL-MCNC: 8.3 MG/DL (ref 8.4–10.2)
CHLORIDE SERPL-SCNC: 100 MMOL/L (ref 96–112)
CO2 SERPL-SCNC: 22 MMOL/L (ref 20–33)
CREAT SERPL-MCNC: 0.91 MG/DL (ref 0.5–1.4)
EOSINOPHIL # BLD AUTO: 0.39 K/UL (ref 0–0.51)
EOSINOPHIL NFR BLD: 3.2 % (ref 0–6.9)
ERYTHROCYTE [DISTWIDTH] IN BLOOD BY AUTOMATED COUNT: 47 FL (ref 35.9–50)
GLOBULIN SER CALC-MCNC: 2.7 G/DL (ref 1.9–3.5)
GLUCOSE BLD-MCNC: 149 MG/DL (ref 65–99)
GLUCOSE BLD-MCNC: 166 MG/DL (ref 65–99)
GLUCOSE SERPL-MCNC: 289 MG/DL (ref 65–99)
HCT VFR BLD AUTO: 36.2 % (ref 37–47)
HGB BLD-MCNC: 12.5 G/DL (ref 12–16)
IMM GRANULOCYTES # BLD AUTO: 0.16 K/UL (ref 0–0.11)
IMM GRANULOCYTES NFR BLD AUTO: 1.3 % (ref 0–0.9)
LYMPHOCYTES # BLD AUTO: 2.26 K/UL (ref 1–4.8)
LYMPHOCYTES NFR BLD: 18.3 % (ref 22–41)
MAGNESIUM SERPL-MCNC: 2.1 MG/DL (ref 1.5–2.5)
MCH RBC QN AUTO: 32 PG (ref 27–33)
MCHC RBC AUTO-ENTMCNC: 34.5 G/DL (ref 33.6–35)
MCV RBC AUTO: 92.6 FL (ref 81.4–97.8)
MONOCYTES # BLD AUTO: 0.71 K/UL (ref 0–0.85)
MONOCYTES NFR BLD AUTO: 5.7 % (ref 0–13.4)
NEUTROPHILS # BLD AUTO: 8.76 K/UL (ref 2–7.15)
NEUTROPHILS NFR BLD: 70.9 % (ref 44–72)
NRBC # BLD AUTO: 0 K/UL
NRBC BLD-RTO: 0 /100 WBC
PLATELET # BLD AUTO: 223 K/UL (ref 164–446)
PMV BLD AUTO: 9.8 FL (ref 9–12.9)
POTASSIUM SERPL-SCNC: 4.3 MMOL/L (ref 3.6–5.5)
PROT SERPL-MCNC: 5.9 G/DL (ref 6–8.2)
RBC # BLD AUTO: 3.91 M/UL (ref 4.2–5.4)
SODIUM SERPL-SCNC: 130 MMOL/L (ref 135–145)
WBC # BLD AUTO: 12.4 K/UL (ref 4.8–10.8)

## 2018-07-07 PROCEDURE — A9270 NON-COVERED ITEM OR SERVICE: HCPCS | Performed by: HOSPITALIST

## 2018-07-07 PROCEDURE — 97110 THERAPEUTIC EXERCISES: CPT

## 2018-07-07 PROCEDURE — 80053 COMPREHEN METABOLIC PANEL: CPT

## 2018-07-07 PROCEDURE — 82962 GLUCOSE BLOOD TEST: CPT | Mod: 91

## 2018-07-07 PROCEDURE — 99233 SBSQ HOSP IP/OBS HIGH 50: CPT | Performed by: HOSPITALIST

## 2018-07-07 PROCEDURE — 700102 HCHG RX REV CODE 250 W/ 637 OVERRIDE(OP): Performed by: HOSPITALIST

## 2018-07-07 PROCEDURE — 700111 HCHG RX REV CODE 636 W/ 250 OVERRIDE (IP): Performed by: HOSPITALIST

## 2018-07-07 PROCEDURE — 85025 COMPLETE CBC W/AUTO DIFF WBC: CPT

## 2018-07-07 PROCEDURE — 770006 HCHG ROOM/CARE - MED/SURG/GYN SEMI*

## 2018-07-07 PROCEDURE — 700102 HCHG RX REV CODE 250 W/ 637 OVERRIDE(OP): Performed by: INTERNAL MEDICINE

## 2018-07-07 PROCEDURE — 83735 ASSAY OF MAGNESIUM: CPT

## 2018-07-07 PROCEDURE — 700105 HCHG RX REV CODE 258: Performed by: HOSPITALIST

## 2018-07-07 PROCEDURE — A9270 NON-COVERED ITEM OR SERVICE: HCPCS | Performed by: INTERNAL MEDICINE

## 2018-07-07 RX ADMIN — NICOTINE 21 MG: 21 PATCH, EXTENDED RELEASE TRANSDERMAL at 06:21

## 2018-07-07 RX ADMIN — INSULIN GLARGINE 15 UNITS: 100 INJECTION, SOLUTION SUBCUTANEOUS at 20:22

## 2018-07-07 RX ADMIN — IBUPROFEN 800 MG: 200 TABLET, FILM COATED ORAL at 10:57

## 2018-07-07 RX ADMIN — SIMVASTATIN 20 MG: 20 TABLET, FILM COATED ORAL at 20:27

## 2018-07-07 RX ADMIN — ALPRAZOLAM 0.25 MG: 0.25 TABLET ORAL at 10:58

## 2018-07-07 RX ADMIN — NICOTINE POLACRILEX 2 MG: 2 GUM, CHEWING ORAL at 00:21

## 2018-07-07 RX ADMIN — NICOTINE POLACRILEX 2 MG: 2 GUM, CHEWING ORAL at 17:00

## 2018-07-07 RX ADMIN — LISINOPRIL 20 MG: 20 TABLET ORAL at 09:07

## 2018-07-07 RX ADMIN — INSULIN LISPRO 10 UNITS: 100 INJECTION, SOLUTION INTRAVENOUS; SUBCUTANEOUS at 20:19

## 2018-07-07 RX ADMIN — INSULIN LISPRO 3 UNITS: 100 INJECTION, SOLUTION INTRAVENOUS; SUBCUTANEOUS at 16:55

## 2018-07-07 RX ADMIN — VANCOMYCIN HYDROCHLORIDE 1100 MG: 5 INJECTION, POWDER, LYOPHILIZED, FOR SOLUTION INTRAVENOUS at 13:47

## 2018-07-07 RX ADMIN — VANCOMYCIN HYDROCHLORIDE 1100 MG: 5 INJECTION, POWDER, LYOPHILIZED, FOR SOLUTION INTRAVENOUS at 04:14

## 2018-07-07 RX ADMIN — NICOTINE POLACRILEX 2 MG: 2 GUM, CHEWING ORAL at 02:01

## 2018-07-07 RX ADMIN — NICOTINE POLACRILEX 2 MG: 2 GUM, CHEWING ORAL at 09:06

## 2018-07-07 RX ADMIN — NICOTINE POLACRILEX 2 MG: 2 GUM, CHEWING ORAL at 04:41

## 2018-07-07 RX ADMIN — MULTIPLE VITAMINS W/ MINERALS TAB 2 TABLET: TAB at 09:07

## 2018-07-07 RX ADMIN — IBUPROFEN 800 MG: 200 TABLET, FILM COATED ORAL at 01:56

## 2018-07-07 RX ADMIN — NICOTINE POLACRILEX 2 MG: 2 GUM, CHEWING ORAL at 20:27

## 2018-07-07 RX ADMIN — INSULIN LISPRO 3 UNITS: 100 INJECTION, SOLUTION INTRAVENOUS; SUBCUTANEOUS at 11:19

## 2018-07-07 ASSESSMENT — ENCOUNTER SYMPTOMS
SORE THROAT: 0
HEADACHES: 0
INSOMNIA: 0
FEVER: 0
HEMOPTYSIS: 0
VOMITING: 0
SPEECH CHANGE: 0
TINGLING: 0
SPUTUM PRODUCTION: 0
NECK PAIN: 0
HALLUCINATIONS: 0
COUGH: 0
CLAUDICATION: 0
DIZZINESS: 0
PALPITATIONS: 0
MEMORY LOSS: 0
BLOOD IN STOOL: 0
ABDOMINAL PAIN: 0
PHOTOPHOBIA: 0
NERVOUS/ANXIOUS: 0
BACK PAIN: 0
NAUSEA: 0
EYE PAIN: 0
CHILLS: 0
BLURRED VISION: 0
WEAKNESS: 0
SHORTNESS OF BREATH: 0
MYALGIAS: 0
HEARTBURN: 0
DOUBLE VISION: 0
STRIDOR: 0
CONSTIPATION: 0
ORTHOPNEA: 0
SENSORY CHANGE: 0
PND: 0
TREMORS: 0
DEPRESSION: 0

## 2018-07-07 ASSESSMENT — PATIENT HEALTH QUESTIONNAIRE - PHQ9
7. TROUBLE CONCENTRATING ON THINGS, SUCH AS READING THE NEWSPAPER OR WATCHING TELEVISION: NOT AT ALL
9. THOUGHTS THAT YOU WOULD BE BETTER OFF DEAD, OR OF HURTING YOURSELF: NOT AT ALL
SUM OF ALL RESPONSES TO PHQ9 QUESTIONS 1 AND 2: 0
8. MOVING OR SPEAKING SO SLOWLY THAT OTHER PEOPLE COULD HAVE NOTICED. OR THE OPPOSITE, BEING SO FIGETY OR RESTLESS THAT YOU HAVE BEEN MOVING AROUND A LOT MORE THAN USUAL: NOT AT ALL
2. FEELING DOWN, DEPRESSED, IRRITABLE, OR HOPELESS: NOT AT ALL
SUM OF ALL RESPONSES TO PHQ QUESTIONS 1-9: 3
4. FEELING TIRED OR HAVING LITTLE ENERGY: SEVERAL DAYS
3. TROUBLE FALLING OR STAYING ASLEEP OR SLEEPING TOO MUCH: SEVERAL DAYS
1. LITTLE INTEREST OR PLEASURE IN DOING THINGS: NOT AT ALL
5. POOR APPETITE OR OVEREATING: NOT AT ALL
2. FEELING DOWN, DEPRESSED, IRRITABLE, OR HOPELESS: NOT AT ALL
2. FEELING DOWN, DEPRESSED, IRRITABLE, OR HOPELESS: NOT AT ALL
1. LITTLE INTEREST OR PLEASURE IN DOING THINGS: NOT AT ALL
SUM OF ALL RESPONSES TO PHQ9 QUESTIONS 1 AND 2: 0
SUM OF ALL RESPONSES TO PHQ9 QUESTIONS 1 AND 2: 0
6. FEELING BAD ABOUT YOURSELF - OR THAT YOU ARE A FAILURE OR HAVE LET YOURSELF OR YOUR FAMILY DOWN: SEVERAL DAYS
1. LITTLE INTEREST OR PLEASURE IN DOING THINGS: NOT AT ALL

## 2018-07-07 ASSESSMENT — PAIN SCALES - GENERAL
PAINLEVEL_OUTOF10: 0
PAINLEVEL_OUTOF10: 4

## 2018-07-07 ASSESSMENT — LIFESTYLE VARIABLES: SUBSTANCE_ABUSE: 1

## 2018-07-07 NOTE — PROGRESS NOTES
Received bedside report from freemanhimarlon Steel. Plan of care discussed. Safety measures in place. Pt resting in bed, no complaints as of this time.

## 2018-07-07 NOTE — THERAPY
"Physical Therapy Evaluation completed.   Bed Mobility:  Supine to Sit: Modified Independent  Transfers: Sit to Stand: Modified Independent  Gait: Level Of Assist: Stand by Assist with Front-Wheel Walker previous therapy session.  Pt refused gait this date secondary to fatigue and high BP.    Plan of Care: Will benefit from Physical Therapy 4 times per week  Discharge Recommendations: Equipment: Will Continue to Assess for Equipment Needs. Post-acute therapy Discharge to a transitional care facility for continued skilled therapy services.    See \"Rehab Therapy-Acute\" Patient Summary Report for complete documentation.   Pt is a 48 y.o.f. referred to PT secondary to weakness and functional decline.  Pt has a dx of bipolar.  Pt was initally seen by PT, but pt refused frequently and requested to be d/c.  Pt is now pleasant and cooperative and c/o weakness.  Pt motivated to participate and would like to start exercising to improve her strength and her circulation.  Pt declined gait this date secondary to fatique and stating her BP has been high.  Pt did participate in seated and supine TE.  Pt demonstrated weakness bilat with TE and fatigued with minimal activity.  Pt would benefit from cont skilled PT in acute care setting to improve strength, tolerance for activity and gait.  "

## 2018-07-07 NOTE — PROGRESS NOTES
Patient  Received stable, alert times 4, room air, not on tele, non weight bearing on the L-lower extremities, denies all pain, no behaviors noted, compliant with all medication, nka, full code, kristen picc patent, being followed by ortho and ID, redness noted to LE, will continue to monitor.

## 2018-07-07 NOTE — PROGRESS NOTES
"[facility] Hospitalist Progress Note    Date of Service: 7/7/2018    Chief Complaint  48 y.o. female admitted 6/26/2018 with diabetic foot ulceration osteomyelitis and associated sepsis.     Interval Problem Update  Still refusing med and assessments. Still extremely belligerent to staff.    Persistently belligerent to all staff. Pt states \"I don't need your fucking help!\". Pt  swearing and states she is in too much pain to be polite. Pt asked staff to leave her alone.   ID stating- Anticipate 6 weeks abx-from date of surgery  Stop date 8/8/2018  Preprandial insulin increased to 10 and lantus increase to 30    7/3  No acute overnight events, patient more compliant today.   serosanguinous drainage noted in wound vac.  On IV antibiotics Continue with vancomycin stop date 8/8/2018  Wound care.  Repeat PT/OT eval today.    7/4  Patient comfortable today, in good mood. Patient denies fevers/chills, chest pain, shortness of breath or nausea/vommiting.   D/c Wound vac today per ortho recs.  Continue IV Vancomycin.  Vancomycin will be titrated to serum concentration levels to ensure adequate levels and reduce risk of toxicity  Wound care.    7/5  Patient comfortable, still having lower extremity discomfort. Otherwise Patient denies fevers/chills, chest pain, shortness of breath or nausea/vommiting.    Patient was caught smoking in her room, cigarettes bought by a friend. She was also able to ambulate without assistance.  Continue IV vancomycin  Vancomycin will be titrated to serum concentration levels to ensure adequate levels and reduce risk of toxicity  Continue wound care.    7/6  No acute issues overnight, patient calm and nice.  Continue IV Vancomycin.  Vancomycin will be titrated to serum concentration levels to ensure adequate levels and reduce risk of toxicity  Awaiting for placement.    7/7  Patient comfortable. No acute complaints.   Continue IV vancomycin, will see if oral equivalent is possibility with ID.  Awaiting " placement    Consultants/Specialty  ortho  ID    Disposition  Placement TPH?        Review of Systems   Constitutional: Negative for chills, fever and malaise/fatigue.   HENT: Negative for congestion, hearing loss, sore throat and tinnitus.    Eyes: Negative for blurred vision, double vision, photophobia and pain.   Respiratory: Negative for cough, hemoptysis, sputum production, shortness of breath and stridor.    Cardiovascular: Negative for chest pain, palpitations, orthopnea, claudication and PND.   Gastrointestinal: Negative for abdominal pain, blood in stool, constipation, heartburn, melena, nausea and vomiting.   Genitourinary: Negative for dysuria, frequency and urgency.   Musculoskeletal: Negative for back pain, myalgias and neck pain.        Reports foot pain   Skin: Negative for itching and rash.   Neurological: Negative for dizziness, tingling, tremors, sensory change, speech change, weakness and headaches.   Psychiatric/Behavioral: Positive for substance abuse. Negative for depression, hallucinations, memory loss and suicidal ideas. The patient is not nervous/anxious and does not have insomnia.       Physical Exam  Laboratory/Imaging   Hemodynamics  Temp (24hrs), Av.7 °C (98 °F), Min:36.2 °C (97.2 °F), Max:36.9 °C (98.5 °F)   Temperature: 36.2 °C (97.2 °F)  Pulse  Av  Min: 64  Max: 125    Blood Pressure: 148/78      Respiratory      Respiration: 20, Pulse Oximetry: 97 %     Work Of Breathing / Effort: Mild  RUL Breath Sounds: Clear, RML Breath Sounds: Clear, RLL Breath Sounds: Diminished, SAUL Breath Sounds: Clear, LLL Breath Sounds: Diminished    Fluids    Intake/Output Summary (Last 24 hours) at 18 1107  Last data filed at 18 1400   Gross per 24 hour   Intake                0 ml   Output                3 ml   Net               -3 ml       Nutrition  Orders Placed This Encounter   Procedures   • Diet Order Diabetic     Standing Status:   Standing     Number of Occurrences:   1      Order Specific Question:   Diet:     Answer:   Diabetic [3]     Physical Exam   Constitutional: She is oriented to person, place, and time. She appears well-developed and well-nourished. No distress.   Patient seen and examined  Discussed plan with DARIANA MCCANN:   Head: Normocephalic and atraumatic.   Right Ear: External ear normal.   Left Ear: External ear normal.   Mouth/Throat: Oropharynx is clear and moist. No oropharyngeal exudate.   Eyes: Conjunctivae are normal. Pupils are equal, round, and reactive to light. Right eye exhibits no discharge. Left eye exhibits no discharge. No scleral icterus.   Neck: Neck supple. No JVD present. No thyromegaly present.   Cardiovascular: Normal rate, regular rhythm, normal heart sounds and intact distal pulses.    No murmur heard.       Pulmonary/Chest: Effort normal and breath sounds normal. No stridor. No respiratory distress. She has no wheezes. She has no rales.   Abdominal: Soft. Bowel sounds are normal. She exhibits no distension. There is no tenderness. There is no rebound.   Musculoskeletal: Normal range of motion. She exhibits no edema or tenderness.   Small 5 mm right great toe base ulceration   Neurological: She is alert and oriented to person, place, and time. No cranial nerve deficit.   Skin: Skin is warm and dry. She is not diaphoretic. No erythema.   Psychiatric: Her behavior is normal. Thought content normal. Her mood appears not anxious.   Anxious today    Nursing note and vitals reviewed.      Recent Labs      07/05/18   0400  07/06/18   0340  07/07/18   0415   WBC  13.0*  15.7*  12.4*   RBC  3.51*  4.34  3.91*   HEMOGLOBIN  11.4*  13.7  12.5   HEMATOCRIT  33.4*  40.2  36.2*   MCV  95.2  92.6  92.6   MCH  32.5  31.6  32.0   MCHC  34.1  34.1  34.5   RDW  48.7  47.1  47.0   PLATELETCT  190  179  223   MPV  10.1  10.6  9.8     Recent Labs      07/05/18   0400  07/06/18   0340  07/07/18   0415   SODIUM  134*  131*  130*   POTASSIUM  4.3  4.0  4.3   CHLORIDE  105   103  100   CO2  24  20  22   GLUCOSE  241*  158*  289*   BUN  18  14  20   CREATININE  0.83  0.78  0.91   CALCIUM  7.9*  8.2*  8.3*                      Assessment/Plan     Bipolar I disorder with gwen ([facility])   Assessment & Plan    Noted to have erratic behavior , currently pleasant.   Refuses seroquel at this time.      Osteomyelitis ([facility])   Assessment & Plan    S/p OPERATION PERFORMED:  1.  Irrigation with excisional debridement, left diabetic foot ulcer.  2.  Left first ray amputation.  3.  Left gastrocnemius recession.  4.  Arthrotomy, second metatarsophalangeal joint.  5.  Placement of a negative pressure dressing of less than 50 square   centimeters.  Continue wound care.  Continue IV Vancomycin.  Vancomycin will be titrated to serum concentration levels to ensure adequate levels and reduce risk of toxicity  Will discuss with infectious disease to see if there is a by mouth equivalent such as Zyvox          Diabetic neuropathy ([facility])   Assessment & Plan    Declines gabapentin, says she gets swelling in legs from this.         Bed bug bite   Assessment & Plan    Patient has numerous lesions on her lower extremity consistent with bedbug bites  No bugs noted here.   Patient is homeless        Sepsis ([facility])   Assessment & Plan    This is sepsis (without associated acute organ dysfunction).   2/2 DM foot infection with osteomyelitis  Bone cultures +Diphtheroids with osteo, s/p I&D.  Continue IV vancomycin  Vancomycin will be titrated to serum concentration levels to ensure adequate levels and reduce risk of toxicity  Active sepsis resolved.          Methadone dependence ([facility])   Assessment & Plan    Patient denies use of narcotics   Utox + Methadone.  Drug seeking behavior        Diabetic foot infection ([facility])   Assessment & Plan    Hx of left toe amputation.  Now has recurrent osteomyelitis.  s/p I&D and 1st toe amputation.          Type 2 diabetes mellitus with neurologic complication ([facility])   Assessment & Plan     Complicated by neuropathy and renal impairment from diabetes.  Cont lantus.  Continue Insulin-sliding scale, accu-checks and hypoglycemia protocol.  A1c:7.7          Morbid obesity (HCC)   Assessment & Plan    Recommend diet and exercise.      Leukocytosis (HCC)  Most likely 2/2 to infection  Slowly improving 12.4<15.7  Pending cbc today.    Uncontrolled Hypertension (HCC)  Continue Lisinopril  20mg  PRN IV Labetalol if sbp>170         Quality-Core Measures   Reviewed items::  EKG reviewed, Labs reviewed, Medications reviewed and Radiology images reviewed  Hernandez catheter::  No Hernandez  DVT prophylaxis pharmacological::  Heparin  DVT prophylaxis - mechanical:  SCDs  Ulcer Prophylaxis::  Yes  Antibiotics:  Treating active infection/contamination beyond 24 hours perioperative coverage  Assessed for rehabilitation services:  Patient was assess for and/or received rehabilitation services during this hospitalization        The total time spent face to face with this patient was about 38 mins of which 60% of time was spent on counseling, review of records including pertinent lab data and studies, as well as discussing diagnostic evaluation and work up, planned therapeutic interventions and future disposition of care. Where indicated, the assessment and plan reflect discussion of patient with consultants, other healthcare providers, family members, and additional research needed to obtain further information in formulating the plan of care of this patient. Patient also on high risk medication vancomycin, will closely monitor for toxicity

## 2018-07-07 NOTE — CARE PLAN
Problem: Safety  Goal: Will remain free from falls  Outcome: PROGRESSING AS EXPECTED  Pt educated on risk level of falling, pt verbalized understanding, call light within reach, safety precatuions of    Problem: Infection  Goal: Will remain free from infection  Outcome: PROGRESSING AS EXPECTED  Assessing pt for worsening signs of infection, hand washing done before and after pt care

## 2018-07-07 NOTE — THERAPY
"Physical Therapy Treatment completed.   Bed Mobility:  Supine to Sit: Independent  Transfers: Sit to Stand: Supervised  Gait: Level Of Assist: declined gait, performed transfers with sba and cues to maintain nwb     Plan of Care: Will benefit from Physical Therapy 4 times per week  Discharge Recommendations: Equipment: Will Continue to Assess for Equipment Needs. Post-acute therapy Discharge to a transitional care facility for continued skilled therapy services.     See \"Rehab Therapy-Acute\" Patient Summary Report for complete documentation.   Pt pleasant and eager to work with PT.  Pt  reported fatigue with w/c mobility.  Pt required cues to maintain NWB with transfers.  Pt cont to state she is fine WB on her heel.  PT cont to educate pt on importance of following wb precautions.  Pt progressed with LE TE increasing reps and exercises.  Pt making gains.  Pt would benefit from cont PT to increase strength and tolerance for activity and to educate pt in HEP.    "

## 2018-07-07 NOTE — PROGRESS NOTES
"Pharmacy Kinetics 48 y.o. female on vancomycin day # 8 2018    Currently on Vancomycin 1100 mg iv q12hr    Indication for Treatment: Left foot osteomyelitis    Pertinent history per medical record: Admitted on 2018 for worsening foot infection.  Post amputation of left great toe in 2017.  Xray and MRI revealed 1st and 2nd MTH osteomyelitis of the left foot.  S/P left 1st ray amputation, I&D.  Infectious disease consulted with plan for 6 wks of Vancomycin from date of surgery.  Stop date 18.    Other antibiotics: N/A    Allergies: Patient has no known allergies.     List concerns for renal function: Obesity (BMI 39.5)    Pertinent cultures to date:    L foot WND/Bone (+) diphtheroids    Recent Labs      18   0400  18   0340  18   0415   WBC  13.0*  15.7*  12.4*   NEUTSPOLYS  71.30  73.00*  70.90     Recent Labs      18   0400  18   0340  18   0415   BUN  18  14  20   CREATININE  0.83  0.78  0.91   ALBUMIN  3.1*  3.6  3.2     Recent Labs      18   1215  18   1505  18   1420   VANCOTROUGH  17.5  12.4  12.1     Intake/Output Summary (Last 24 hours) at 18 1148  Last data filed at 18 1400   Gross per 24 hour   Intake                0 ml   Output                3 ml   Net               -3 ml      Blood pressure 148/78, pulse (!) 103, temperature 36.2 °C (97.2 °F), resp. rate 20, height 1.676 m (5' 6\"), weight 120 kg (264 lb 8.8 oz), SpO2 97 %, not currently breastfeeding. Temp (24hrs), Av.7 °C (98 °F), Min:36.2 °C (97.2 °F), Max:36.9 °C (98.5 °F)      A/P   1. Vancomycin dose: Continue on vancomycin 1100 mg every 12 hours.  2. Next vancomycin level: Repeat levels 18 if vancomycin continues.  3. Goal trough: 15-16 mcg/ml  4. Comments: Continue to follow and adjust vancomycin as needed per protocol.    Zan Marte, PharmD BCPS      "

## 2018-07-08 LAB
ALBUMIN SERPL BCP-MCNC: 3.2 G/DL (ref 3.2–4.9)
ALBUMIN/GLOB SERPL: 1.3 G/DL
ALP SERPL-CCNC: 58 U/L (ref 30–99)
ALT SERPL-CCNC: 18 U/L (ref 2–50)
ANION GAP SERPL CALC-SCNC: 6 MMOL/L (ref 0–11.9)
AST SERPL-CCNC: 20 U/L (ref 12–45)
BASOPHILS # BLD AUTO: 0.6 % (ref 0–1.8)
BASOPHILS # BLD: 0.08 K/UL (ref 0–0.12)
BILIRUB SERPL-MCNC: 0.6 MG/DL (ref 0.1–1.5)
BUN SERPL-MCNC: 18 MG/DL (ref 8–22)
CALCIUM SERPL-MCNC: 8.1 MG/DL (ref 8.4–10.2)
CHLORIDE SERPL-SCNC: 107 MMOL/L (ref 96–112)
CO2 SERPL-SCNC: 22 MMOL/L (ref 20–33)
COMMENT 1642: NORMAL
CREAT SERPL-MCNC: 0.87 MG/DL (ref 0.5–1.4)
EOSINOPHIL # BLD AUTO: 0.51 K/UL (ref 0–0.51)
EOSINOPHIL NFR BLD: 3.9 % (ref 0–6.9)
ERYTHROCYTE [DISTWIDTH] IN BLOOD BY AUTOMATED COUNT: 49.7 FL (ref 35.9–50)
GLOBULIN SER CALC-MCNC: 2.4 G/DL (ref 1.9–3.5)
GLUCOSE BLD-MCNC: 190 MG/DL (ref 65–99)
GLUCOSE BLD-MCNC: 192 MG/DL (ref 65–99)
GLUCOSE BLD-MCNC: 192 MG/DL (ref 65–99)
GLUCOSE BLD-MCNC: 193 MG/DL (ref 65–99)
GLUCOSE BLD-MCNC: 204 MG/DL (ref 65–99)
GLUCOSE BLD-MCNC: 345 MG/DL (ref 65–99)
GLUCOSE SERPL-MCNC: 150 MG/DL (ref 65–99)
HCT VFR BLD AUTO: 36.9 % (ref 37–47)
HGB BLD-MCNC: 12.6 G/DL (ref 12–16)
IMM GRANULOCYTES # BLD AUTO: 0.25 K/UL (ref 0–0.11)
IMM GRANULOCYTES NFR BLD AUTO: 1.9 % (ref 0–0.9)
LYMPHOCYTES # BLD AUTO: 3.04 K/UL (ref 1–4.8)
LYMPHOCYTES NFR BLD: 23.1 % (ref 22–41)
MAGNESIUM SERPL-MCNC: 2 MG/DL (ref 1.5–2.5)
MCH RBC QN AUTO: 32.5 PG (ref 27–33)
MCHC RBC AUTO-ENTMCNC: 34.1 G/DL (ref 33.6–35)
MCV RBC AUTO: 95.1 FL (ref 81.4–97.8)
MONOCYTES # BLD AUTO: 1.01 K/UL (ref 0–0.85)
MONOCYTES NFR BLD AUTO: 7.7 % (ref 0–13.4)
NEUTROPHILS # BLD AUTO: 8.25 K/UL (ref 2–7.15)
NEUTROPHILS NFR BLD: 62.8 % (ref 44–72)
NRBC # BLD AUTO: 0 K/UL
NRBC BLD-RTO: 0 /100 WBC
PLATELET # BLD AUTO: 203 K/UL (ref 164–446)
PMV BLD AUTO: 9.9 FL (ref 9–12.9)
POTASSIUM SERPL-SCNC: 4.3 MMOL/L (ref 3.6–5.5)
PROT SERPL-MCNC: 5.6 G/DL (ref 6–8.2)
RBC # BLD AUTO: 3.88 M/UL (ref 4.2–5.4)
SODIUM SERPL-SCNC: 135 MMOL/L (ref 135–145)
WBC # BLD AUTO: 13.1 K/UL (ref 4.8–10.8)

## 2018-07-08 PROCEDURE — 85025 COMPLETE CBC W/AUTO DIFF WBC: CPT

## 2018-07-08 PROCEDURE — A9270 NON-COVERED ITEM OR SERVICE: HCPCS | Performed by: INTERNAL MEDICINE

## 2018-07-08 PROCEDURE — A9270 NON-COVERED ITEM OR SERVICE: HCPCS | Performed by: HOSPITALIST

## 2018-07-08 PROCEDURE — 770006 HCHG ROOM/CARE - MED/SURG/GYN SEMI*

## 2018-07-08 PROCEDURE — 83735 ASSAY OF MAGNESIUM: CPT

## 2018-07-08 PROCEDURE — 700102 HCHG RX REV CODE 250 W/ 637 OVERRIDE(OP): Performed by: HOSPITALIST

## 2018-07-08 PROCEDURE — 99233 SBSQ HOSP IP/OBS HIGH 50: CPT | Performed by: HOSPITALIST

## 2018-07-08 PROCEDURE — 700111 HCHG RX REV CODE 636 W/ 250 OVERRIDE (IP): Performed by: HOSPITALIST

## 2018-07-08 PROCEDURE — 82962 GLUCOSE BLOOD TEST: CPT

## 2018-07-08 PROCEDURE — 80053 COMPREHEN METABOLIC PANEL: CPT

## 2018-07-08 PROCEDURE — 700105 HCHG RX REV CODE 258: Performed by: HOSPITALIST

## 2018-07-08 PROCEDURE — 700102 HCHG RX REV CODE 250 W/ 637 OVERRIDE(OP): Performed by: INTERNAL MEDICINE

## 2018-07-08 RX ORDER — NYSTATIN 100000 U/G
CREAM TOPICAL 2 TIMES DAILY
Status: DISCONTINUED | OUTPATIENT
Start: 2018-07-08 | End: 2018-07-10 | Stop reason: HOSPADM

## 2018-07-08 RX ORDER — NYSTATIN 100000 [USP'U]/G
POWDER TOPICAL 2 TIMES DAILY
Status: DISCONTINUED | OUTPATIENT
Start: 2018-07-08 | End: 2018-07-10 | Stop reason: HOSPADM

## 2018-07-08 RX ADMIN — LISINOPRIL 20 MG: 20 TABLET ORAL at 07:57

## 2018-07-08 RX ADMIN — NICOTINE POLACRILEX 2 MG: 2 GUM, CHEWING ORAL at 15:50

## 2018-07-08 RX ADMIN — ALPRAZOLAM 0.25 MG: 0.25 TABLET ORAL at 22:49

## 2018-07-08 RX ADMIN — IBUPROFEN 800 MG: 200 TABLET, FILM COATED ORAL at 22:49

## 2018-07-08 RX ADMIN — IBUPROFEN 800 MG: 200 TABLET, FILM COATED ORAL at 10:35

## 2018-07-08 RX ADMIN — INSULIN LISPRO 3 UNITS: 100 INJECTION, SOLUTION INTRAVENOUS; SUBCUTANEOUS at 10:34

## 2018-07-08 RX ADMIN — ALPRAZOLAM 0.25 MG: 0.25 TABLET ORAL at 10:35

## 2018-07-08 RX ADMIN — NICOTINE POLACRILEX 2 MG: 2 GUM, CHEWING ORAL at 21:17

## 2018-07-08 RX ADMIN — IBUPROFEN 400 MG: 200 TABLET, FILM COATED ORAL at 17:26

## 2018-07-08 RX ADMIN — NICOTINE POLACRILEX 2 MG: 2 GUM, CHEWING ORAL at 22:49

## 2018-07-08 RX ADMIN — INSULIN LISPRO 3 UNITS: 100 INJECTION, SOLUTION INTRAVENOUS; SUBCUTANEOUS at 06:01

## 2018-07-08 RX ADMIN — INSULIN LISPRO 4 UNITS: 100 INJECTION, SOLUTION INTRAVENOUS; SUBCUTANEOUS at 17:27

## 2018-07-08 RX ADMIN — NYSTATIN: 100000 POWDER TOPICAL at 14:55

## 2018-07-08 RX ADMIN — INSULIN GLARGINE 15 UNITS: 100 INJECTION, SOLUTION SUBCUTANEOUS at 21:11

## 2018-07-08 RX ADMIN — VANCOMYCIN HYDROCHLORIDE 1100 MG: 5 INJECTION, POWDER, LYOPHILIZED, FOR SOLUTION INTRAVENOUS at 03:23

## 2018-07-08 RX ADMIN — MULTIPLE VITAMINS W/ MINERALS TAB 2 TABLET: TAB at 07:57

## 2018-07-08 RX ADMIN — SIMVASTATIN 20 MG: 20 TABLET, FILM COATED ORAL at 21:17

## 2018-07-08 RX ADMIN — NYSTATIN: 100000 CREAM TOPICAL at 21:35

## 2018-07-08 RX ADMIN — NICOTINE POLACRILEX 2 MG: 2 GUM, CHEWING ORAL at 06:13

## 2018-07-08 RX ADMIN — NICOTINE POLACRILEX 2 MG: 2 GUM, CHEWING ORAL at 11:36

## 2018-07-08 RX ADMIN — INSULIN LISPRO 3 UNITS: 100 INJECTION, SOLUTION INTRAVENOUS; SUBCUTANEOUS at 21:11

## 2018-07-08 RX ADMIN — NYSTATIN: 100000 POWDER TOPICAL at 21:35

## 2018-07-08 RX ADMIN — NICOTINE POLACRILEX 2 MG: 2 GUM, CHEWING ORAL at 19:14

## 2018-07-08 RX ADMIN — VANCOMYCIN HYDROCHLORIDE 1100 MG: 5 INJECTION, POWDER, LYOPHILIZED, FOR SOLUTION INTRAVENOUS at 15:17

## 2018-07-08 RX ADMIN — NICOTINE 21 MG: 21 PATCH, EXTENDED RELEASE TRANSDERMAL at 07:58

## 2018-07-08 RX ADMIN — NICOTINE POLACRILEX 2 MG: 2 GUM, CHEWING ORAL at 03:22

## 2018-07-08 ASSESSMENT — ENCOUNTER SYMPTOMS
STRIDOR: 0
CONSTIPATION: 0
DEPRESSION: 0
MEMORY LOSS: 0
NAUSEA: 0
MYALGIAS: 0
ORTHOPNEA: 0
DIZZINESS: 0
WEAKNESS: 0
ABDOMINAL PAIN: 0
FEVER: 0
DOUBLE VISION: 0
BLURRED VISION: 0
SPEECH CHANGE: 0
BACK PAIN: 0
VOMITING: 0
PALPITATIONS: 0
BLOOD IN STOOL: 0
INSOMNIA: 0
NECK PAIN: 0
HEARTBURN: 0
CLAUDICATION: 0
SORE THROAT: 0
COUGH: 0
PHOTOPHOBIA: 0
TINGLING: 0
CHILLS: 0
HALLUCINATIONS: 0
SPUTUM PRODUCTION: 0
HEMOPTYSIS: 0
PND: 0
SENSORY CHANGE: 0
EYE PAIN: 0
SHORTNESS OF BREATH: 0
TREMORS: 0
NERVOUS/ANXIOUS: 0
HEADACHES: 0

## 2018-07-08 ASSESSMENT — PATIENT HEALTH QUESTIONNAIRE - PHQ9
9. THOUGHTS THAT YOU WOULD BE BETTER OFF DEAD, OR OF HURTING YOURSELF: NOT AT ALL
6. FEELING BAD ABOUT YOURSELF - OR THAT YOU ARE A FAILURE OR HAVE LET YOURSELF OR YOUR FAMILY DOWN: SEVERAL DAYS
SUM OF ALL RESPONSES TO PHQ9 QUESTIONS 1 AND 2: 0
4. FEELING TIRED OR HAVING LITTLE ENERGY: SEVERAL DAYS
5. POOR APPETITE OR OVEREATING: NOT AT ALL
1. LITTLE INTEREST OR PLEASURE IN DOING THINGS: NOT AT ALL
3. TROUBLE FALLING OR STAYING ASLEEP OR SLEEPING TOO MUCH: SEVERAL DAYS
7. TROUBLE CONCENTRATING ON THINGS, SUCH AS READING THE NEWSPAPER OR WATCHING TELEVISION: NOT AT ALL
SUM OF ALL RESPONSES TO PHQ QUESTIONS 1-9: 3
8. MOVING OR SPEAKING SO SLOWLY THAT OTHER PEOPLE COULD HAVE NOTICED. OR THE OPPOSITE, BEING SO FIGETY OR RESTLESS THAT YOU HAVE BEEN MOVING AROUND A LOT MORE THAN USUAL: NOT AT ALL
2. FEELING DOWN, DEPRESSED, IRRITABLE, OR HOPELESS: NOT AT ALL

## 2018-07-08 ASSESSMENT — PAIN SCALES - GENERAL
PAINLEVEL_OUTOF10: 0

## 2018-07-08 ASSESSMENT — LIFESTYLE VARIABLES: SUBSTANCE_ABUSE: 1

## 2018-07-08 NOTE — PROGRESS NOTES
Patient received stable, vitals wnl, alert times 4, room air, compliant with am medications, denies all pain, iv access patent, skin impairments noted, full code, nka,will continue to monitor.

## 2018-07-08 NOTE — CARE PLAN
Problem: Safety  Goal: Will remain free from injury  Outcome: PROGRESSING AS EXPECTED  Discussed with patient to use call light before getting out of bed if needs help, feels dizzy or weakness.     Problem: Infection  Goal: Will remain free from infection  Outcome: PROGRESSING AS EXPECTED  Discussed infection prevention with patient and how its important to wash hands frequently to prevent spread of infection.

## 2018-07-08 NOTE — PROGRESS NOTES
Pt is A&Ox4, resting comfortably in chair. Assessment completed and denies any pain at this time. Bed is in lowest postion, and side rails up x2, pt calls when needs assistance and call light within reach.

## 2018-07-08 NOTE — PROGRESS NOTES
"[facility] Hospitalist Progress Note    Date of Service: 7/8/2018    Chief Complaint  48 y.o. female admitted 6/26/2018 with diabetic foot ulceration osteomyelitis and associated sepsis.     Interval Problem Update  Still refusing med and assessments. Still extremely belligerent to staff.    Persistently belligerent to all staff. Pt states \"I don't need your fucking help!\". Pt  swearing and states she is in too much pain to be polite. Pt asked staff to leave her alone.   ID stating- Anticipate 6 weeks abx-from date of surgery  Stop date 8/8/2018  Preprandial insulin increased to 10 and lantus increase to 30    7/3  No acute overnight events, patient more compliant today.   serosanguinous drainage noted in wound vac.  On IV antibiotics Continue with vancomycin stop date 8/8/2018  Wound care.  Repeat PT/OT eval today.    7/4  Patient comfortable today, in good mood. Patient denies fevers/chills, chest pain, shortness of breath or nausea/vommiting.   D/c Wound vac today per ortho recs.  Continue IV Vancomycin.  Vancomycin will be titrated to serum concentration levels to ensure adequate levels and reduce risk of toxicity  Wound care.    7/5  Patient comfortable, still having lower extremity discomfort. Otherwise Patient denies fevers/chills, chest pain, shortness of breath or nausea/vommiting.    Patient was caught smoking in her room, cigarettes bought by a friend. She was also able to ambulate without assistance.  Continue IV vancomycin  Vancomycin will be titrated to serum concentration levels to ensure adequate levels and reduce risk of toxicity  Continue wound care.    7/6  No acute issues overnight, patient calm and nice.  Continue IV Vancomycin.  Vancomycin will be titrated to serum concentration levels to ensure adequate levels and reduce risk of toxicity  Awaiting for placement.    7/7  Patient comfortable. No acute complaints.   Continue IV vancomycin, will see if oral equivalent is possibility with ID.  Awaiting " placement      No acute events overnight. Patient denies fevers/chills, chest pain, shortness of breath or nausea/vommiting. Only complaints was mild skin rash around abdominal folds. nystatin powder offered.  Awaiting for placement  continue IV vancomycin. Stop date 2018  Wound care.      Consultants/Specialty  ortho  ID    Disposition  Placement TPH?        Review of Systems   Constitutional: Negative for chills, fever and malaise/fatigue.   HENT: Negative for congestion, hearing loss, sore throat and tinnitus.    Eyes: Negative for blurred vision, double vision, photophobia and pain.   Respiratory: Negative for cough, hemoptysis, sputum production, shortness of breath and stridor.    Cardiovascular: Negative for chest pain, palpitations, orthopnea, claudication and PND.   Gastrointestinal: Negative for abdominal pain, blood in stool, constipation, heartburn, melena, nausea and vomiting.   Genitourinary: Negative for dysuria, frequency and urgency.   Musculoskeletal: Negative for back pain, joint pain, myalgias and neck pain.        Reports foot pain   Skin: Negative for itching and rash.   Neurological: Negative for dizziness, tingling, tremors, sensory change, speech change, weakness and headaches.   Psychiatric/Behavioral: Positive for substance abuse. Negative for depression, hallucinations, memory loss and suicidal ideas. The patient is not nervous/anxious and does not have insomnia.       Physical Exam  Laboratory/Imaging   Hemodynamics  Temp (24hrs), Av.6 °C (97.8 °F), Min:36.3 °C (97.4 °F), Max:36.7 °C (98.1 °F)   Temperature: 36.7 °C (98.1 °F)  Pulse  Av.7  Min: 64  Max: 125    Blood Pressure: 143/72      Respiratory      Respiration: 18, Pulse Oximetry: 97 %     Work Of Breathing / Effort: Mild  RUL Breath Sounds: Clear, RML Breath Sounds: Clear, RLL Breath Sounds: Diminished, SAUL Breath Sounds: Clear, LLL Breath Sounds: Diminished    Fluids    Intake/Output Summary (Last 24 hours) at  07/08/18 1517  Last data filed at 07/07/18 1800   Gross per 24 hour   Intake              600 ml   Output                0 ml   Net              600 ml       Nutrition  Orders Placed This Encounter   Procedures   • Diet Order Diabetic     Standing Status:   Standing     Number of Occurrences:   1     Order Specific Question:   Diet:     Answer:   Diabetic [3]     Physical Exam   Constitutional: She is oriented to person, place, and time. She appears well-developed and well-nourished. No distress.   Patient seen and examined  Discussed plan with RN   SHARONT:   Head: Normocephalic and atraumatic.   Right Ear: External ear normal.   Left Ear: External ear normal.   Mouth/Throat: Oropharynx is clear and moist. No oropharyngeal exudate.   Eyes: Conjunctivae are normal. Pupils are equal, round, and reactive to light. Right eye exhibits no discharge. Left eye exhibits no discharge. No scleral icterus.   Neck: Neck supple. No JVD present. No thyromegaly present.   Cardiovascular: Normal rate, regular rhythm, normal heart sounds and intact distal pulses.    No murmur heard.       Pulmonary/Chest: Effort normal and breath sounds normal. No stridor. No respiratory distress. She has no wheezes. She has no rales.   Abdominal: Soft. Bowel sounds are normal. She exhibits no distension. There is no tenderness. There is no rebound.   Musculoskeletal: Normal range of motion. She exhibits no edema or tenderness.   Small 5 mm right great toe base ulceration   Neurological: She is alert and oriented to person, place, and time. No cranial nerve deficit.   Skin: Skin is warm and dry. She is not diaphoretic. No erythema.   Psychiatric: Her behavior is normal. Thought content normal. Her mood appears not anxious.   Nursing note and vitals reviewed.      Recent Labs      07/06/18   0340  07/07/18   0415  07/08/18   0305   WBC  15.7*  12.4*  13.1*   RBC  4.34  3.91*  3.88*   HEMOGLOBIN  13.7  12.5  12.6   HEMATOCRIT  40.2  36.2*  36.9*   MCV   92.6  92.6  95.1   MCH  31.6  32.0  32.5   MCHC  34.1  34.5  34.1   RDW  47.1  47.0  49.7   PLATELETCT  179  223  203   MPV  10.6  9.8  9.9     Recent Labs      07/06/18   0340  07/07/18   0415  07/08/18   0305   SODIUM  131*  130*  135   POTASSIUM  4.0  4.3  4.3   CHLORIDE  103  100  107   CO2  20  22  22   GLUCOSE  158*  289*  150*   BUN  14  20  18   CREATININE  0.78  0.91  0.87   CALCIUM  8.2*  8.3*  8.1*                      Assessment/Plan     Bipolar I disorder with gwen ([facility])   Assessment & Plan    Noted to have erratic behavior , currently pleasant.   Refuses seroquel at this time.      Osteomyelitis ([facility])   Assessment & Plan    S/p OPERATION PERFORMED:  1.  Irrigation with excisional debridement, left diabetic foot ulcer.  2.  Left first ray amputation.  3.  Left gastrocnemius recession.  4.  Arthrotomy, second metatarsophalangeal joint.  5.  Placement of a negative pressure dressing of less than 50 square   centimeters.  Continue wound care.  Continue IV Vancomycin.  Vancomycin will be titrated to serum concentration levels to ensure adequate levels and reduce risk of toxicity Stop date 8/8/2018            Diabetic neuropathy ([facility])   Assessment & Plan    Declines gabapentin, says she gets swelling in legs from this.         Bed bug bite   Assessment & Plan    Patient has numerous lesions on her lower extremity consistent with bedbug bites  No bugs noted here.   Patient is homeless        Sepsis ([facility])   Assessment & Plan    This is sepsis (without associated acute organ dysfunction).   2/2 DM foot infection with osteomyelitis  Bone cultures +Diphtheroids with osteo, s/p I&D.  Continue IV vancomycin  Vancomycin will be titrated to serum concentration levels to ensure adequate levels and reduce risk of toxicity  Sepsis resolved.          Methadone dependence ([facility])   Assessment & Plan    Patient denies use of narcotics   Utox + Methadone.  Drug seeking behavior        Diabetic foot infection ([facility])    Assessment & Plan    Hx of left toe amputation.  Now has recurrent osteomyelitis.  s/p I&D and 1st toe amputation.          Type 2 diabetes mellitus with neurologic complication (HCC)   Assessment & Plan    Complicated by neuropathy and renal impairment from diabetes.  Cont lantus.  Continue Insulin-sliding scale, accu-checks and hypoglycemia protocol.  A1c:7.7          Morbid obesity (HCC)   Assessment & Plan    Recommend diet and exercise.      Leukocytosis (HCC)  Most likely 2/2 to infection  Slowly improving 13.1<12.4<15.7  CTM    Uncontrolled Hypertension ([facility])  Maintain lisinopril dose at 20mg daily.  PRN IV Labetalol if sbp>170         Quality-Core Measures   Reviewed items::  EKG reviewed, Labs reviewed, Medications reviewed and Radiology images reviewed  Hernandez catheter::  No Hernandez  DVT prophylaxis pharmacological::  Heparin  DVT prophylaxis - mechanical:  SCDs  Ulcer Prophylaxis::  Yes  Antibiotics:  Treating active infection/contamination beyond 24 hours perioperative coverage  Assessed for rehabilitation services:  Patient was assess for and/or received rehabilitation services during this hospitalization        The total time spent face to face with this patient was about 38 mins of which 60% of time was spent on counseling, review of records including pertinent lab data and studies, as well as discussing diagnostic evaluation and work up, planned therapeutic interventions and future disposition of care. Where indicated, the assessment and plan reflect discussion of patient with consultants, other healthcare providers, family members, and additional research needed to obtain further information in formulating the plan of care of this patient. Patient also on high risk medication vancomycin, will closely monitor for toxicity

## 2018-07-08 NOTE — PROGRESS NOTES
Received report from Elin WESTBROOK. Discussed plan of care and safety measures in place. No further needs at this time.

## 2018-07-08 NOTE — PROGRESS NOTES
"Pharmacy Kinetics 48 y.o. female on vancomycin day # 9  2018    Currently on Vancomycin 1100 mg iv q12hr    Indication for Treatment: Left foot osteomyelitis    Pertinent history per medical record: Admitted on 2018 for worsening foot infection.  Post amputation of left great toe in 2017.  Xray and MRI revealed 1st and 2nd MTH osteomyelitis of the left foot.  S/P left 1st ray amputation, I&D.  Infectious disease consulted with plan for 6 wks of Vancomycin from date of surgery.  Stop date 18.    Other antibiotics: N/A    Allergies: Patient has no known allergies.     List concerns for renal function: Obesity (BMI 39.5)    Pertinent cultures to date:    L foot WND/Bone (+) diphtheroids    Recent Labs      18   0340  18   0415  18   0305   WBC  15.7*  12.4*  13.1*   NEUTSPOLYS  73.00*  70.90  62.80     Recent Labs      18   0340  18   0415  18   0305   BUN  14  20  18   CREATININE  0.78  0.91  0.87   ALBUMIN  3.6  3.2  3.2     Recent Labs      18   1505  18   1420   VANCOTROUGH  12.4  12.1     Intake/Output Summary (Last 24 hours) at 18 1124  Last data filed at 18 1800   Gross per 24 hour   Intake             1200 ml   Output                0 ml   Net             1200 ml      Blood pressure 143/72, pulse 75, temperature 36.7 °C (98.1 °F), resp. rate 18, height 1.676 m (5' 6\"), weight 120 kg (264 lb 8.8 oz), SpO2 97 %, not currently breastfeeding. Temp (24hrs), Av.6 °C (97.9 °F), Min:36.3 °C (97.4 °F), Max:36.7 °C (98.1 °F)      A/P   1. Vancomycin dose: Vancomycin 1100 mg every 12 hours  2. Next vancomycin level: 18 at 15:30.  3. Goal trough: 15-16 mcg/ml  4. Comments: Will check vancomycin trough prior to afternoon dose of vancomycin 18 to confirm therapeutic trough.    Zan Marte, PharmD BCPS      "

## 2018-07-09 PROBLEM — I10 HYPERTENSION: Status: ACTIVE | Noted: 2018-07-09

## 2018-07-09 PROBLEM — A41.9 SEPSIS (HCC): Status: RESOLVED | Noted: 2018-06-26 | Resolved: 2018-07-09

## 2018-07-09 PROBLEM — D72.829 LEUKOCYTOSIS: Status: ACTIVE | Noted: 2018-07-09

## 2018-07-09 LAB
GLUCOSE BLD-MCNC: 180 MG/DL (ref 65–99)
GLUCOSE BLD-MCNC: 192 MG/DL (ref 65–99)
GLUCOSE BLD-MCNC: 262 MG/DL (ref 65–99)
GLUCOSE BLD-MCNC: 299 MG/DL (ref 65–99)

## 2018-07-09 PROCEDURE — 700111 HCHG RX REV CODE 636 W/ 250 OVERRIDE (IP): Performed by: HOSPITALIST

## 2018-07-09 PROCEDURE — 99233 SBSQ HOSP IP/OBS HIGH 50: CPT | Performed by: HOSPITALIST

## 2018-07-09 PROCEDURE — 82962 GLUCOSE BLOOD TEST: CPT | Mod: 91

## 2018-07-09 PROCEDURE — 700102 HCHG RX REV CODE 250 W/ 637 OVERRIDE(OP): Performed by: INTERNAL MEDICINE

## 2018-07-09 PROCEDURE — A9270 NON-COVERED ITEM OR SERVICE: HCPCS | Performed by: HOSPITALIST

## 2018-07-09 PROCEDURE — 770006 HCHG ROOM/CARE - MED/SURG/GYN SEMI*

## 2018-07-09 PROCEDURE — 700105 HCHG RX REV CODE 258: Performed by: HOSPITALIST

## 2018-07-09 PROCEDURE — A9270 NON-COVERED ITEM OR SERVICE: HCPCS | Performed by: INTERNAL MEDICINE

## 2018-07-09 PROCEDURE — 700102 HCHG RX REV CODE 250 W/ 637 OVERRIDE(OP): Performed by: HOSPITALIST

## 2018-07-09 RX ORDER — SIMVASTATIN 20 MG
20 TABLET ORAL EVERY EVENING
Qty: 30 TAB | Refills: 1 | Status: SHIPPED | OUTPATIENT
Start: 2018-07-09 | End: 2020-11-27

## 2018-07-09 RX ORDER — LINEZOLID 600 MG/1
600 TABLET, FILM COATED ORAL EVERY 12 HOURS
Status: DISCONTINUED | OUTPATIENT
Start: 2018-07-09 | End: 2018-07-10 | Stop reason: HOSPADM

## 2018-07-09 RX ORDER — LINEZOLID 600 MG/1
600 TABLET, FILM COATED ORAL EVERY 12 HOURS
Qty: 60 TAB | Refills: 0 | Status: SHIPPED | OUTPATIENT
Start: 2018-07-09 | End: 2018-08-08

## 2018-07-09 RX ORDER — LISINOPRIL 20 MG/1
20 TABLET ORAL DAILY
Qty: 30 TAB | Refills: 1 | Status: SHIPPED | OUTPATIENT
Start: 2018-07-10 | End: 2020-11-27 | Stop reason: SINTOL

## 2018-07-09 RX ORDER — AMOXICILLIN 500 MG/1
1000 TABLET, FILM COATED ORAL 2 TIMES DAILY
Qty: 56 TAB | Refills: 0 | Status: SHIPPED | OUTPATIENT
Start: 2018-07-23 | End: 2018-08-06

## 2018-07-09 RX ADMIN — ALPRAZOLAM 0.25 MG: 0.25 TABLET ORAL at 21:59

## 2018-07-09 RX ADMIN — NICOTINE POLACRILEX 2 MG: 2 GUM, CHEWING ORAL at 09:21

## 2018-07-09 RX ADMIN — NICOTINE POLACRILEX 2 MG: 2 GUM, CHEWING ORAL at 18:37

## 2018-07-09 RX ADMIN — NYSTATIN: 100000 CREAM TOPICAL at 22:03

## 2018-07-09 RX ADMIN — NICOTINE POLACRILEX 2 MG: 2 GUM, CHEWING ORAL at 15:10

## 2018-07-09 RX ADMIN — NYSTATIN: 100000 POWDER TOPICAL at 22:07

## 2018-07-09 RX ADMIN — LISINOPRIL 20 MG: 20 TABLET ORAL at 09:20

## 2018-07-09 RX ADMIN — NICOTINE POLACRILEX 2 MG: 2 GUM, CHEWING ORAL at 11:44

## 2018-07-09 RX ADMIN — NICOTINE POLACRILEX 2 MG: 2 GUM, CHEWING ORAL at 22:02

## 2018-07-09 RX ADMIN — INSULIN GLARGINE 30 UNITS: 100 INJECTION, SOLUTION SUBCUTANEOUS at 21:52

## 2018-07-09 RX ADMIN — INSULIN LISPRO 7 UNITS: 100 INJECTION, SOLUTION INTRAVENOUS; SUBCUTANEOUS at 17:34

## 2018-07-09 RX ADMIN — INSULIN LISPRO 3 UNITS: 100 INJECTION, SOLUTION INTRAVENOUS; SUBCUTANEOUS at 05:46

## 2018-07-09 RX ADMIN — IBUPROFEN 800 MG: 200 TABLET, FILM COATED ORAL at 21:58

## 2018-07-09 RX ADMIN — NICOTINE POLACRILEX 2 MG: 2 GUM, CHEWING ORAL at 04:40

## 2018-07-09 RX ADMIN — IBUPROFEN 800 MG: 200 TABLET, FILM COATED ORAL at 09:20

## 2018-07-09 RX ADMIN — VANCOMYCIN HYDROCHLORIDE 1100 MG: 5 INJECTION, POWDER, LYOPHILIZED, FOR SOLUTION INTRAVENOUS at 04:27

## 2018-07-09 RX ADMIN — NICOTINE 21 MG: 21 PATCH, EXTENDED RELEASE TRANSDERMAL at 05:39

## 2018-07-09 RX ADMIN — NYSTATIN: 100000 CREAM TOPICAL at 09:21

## 2018-07-09 RX ADMIN — INSULIN LISPRO 3 UNITS: 100 INJECTION, SOLUTION INTRAVENOUS; SUBCUTANEOUS at 21:51

## 2018-07-09 RX ADMIN — LINEZOLID 600 MG: 600 TABLET, FILM COATED ORAL at 21:58

## 2018-07-09 RX ADMIN — SIMVASTATIN 20 MG: 20 TABLET, FILM COATED ORAL at 21:59

## 2018-07-09 RX ADMIN — MULTIPLE VITAMINS W/ MINERALS TAB 2 TABLET: TAB at 09:20

## 2018-07-09 RX ADMIN — ALPRAZOLAM 0.25 MG: 0.25 TABLET ORAL at 09:20

## 2018-07-09 RX ADMIN — INSULIN LISPRO 7 UNITS: 100 INJECTION, SOLUTION INTRAVENOUS; SUBCUTANEOUS at 11:46

## 2018-07-09 ASSESSMENT — LIFESTYLE VARIABLES
ALCOHOL_USE: YES
HAVE PEOPLE ANNOYED YOU BY CRITICIZING YOUR DRINKING: NO
CONSUMPTION TOTAL: NEGATIVE
TOTAL SCORE: 1
EVER FELT BAD OR GUILTY ABOUT YOUR DRINKING: YES
HOW MANY TIMES IN THE PAST YEAR HAVE YOU HAD 5 OR MORE DRINKS IN A DAY: 0
HAVE YOU EVER FELT YOU SHOULD CUT DOWN ON YOUR DRINKING: NO
AVERAGE NUMBER OF DAYS PER WEEK YOU HAVE A DRINK CONTAINING ALCOHOL: 2
ON A TYPICAL DAY WHEN YOU DRINK ALCOHOL HOW MANY DRINKS DO YOU HAVE: 2
TOTAL SCORE: 1
TOTAL SCORE: 1
SUBSTANCE_ABUSE: 1
EVER HAD A DRINK FIRST THING IN THE MORNING TO STEADY YOUR NERVES TO GET RID OF A HANGOVER: NO

## 2018-07-09 ASSESSMENT — ENCOUNTER SYMPTOMS
SPUTUM PRODUCTION: 0
SPEECH CHANGE: 0
PND: 0
HEMOPTYSIS: 0
VOMITING: 0
WEAKNESS: 0
HALLUCINATIONS: 0
SHORTNESS OF BREATH: 0
SENSORY CHANGE: 0
DIZZINESS: 0
NAUSEA: 0
STRIDOR: 0
FEVER: 0
ORTHOPNEA: 0
CHILLS: 0
INSOMNIA: 1
BLOOD IN STOOL: 0
PALPITATIONS: 0
HEARTBURN: 0
CLAUDICATION: 0
MEMORY LOSS: 0
DOUBLE VISION: 0
NECK PAIN: 0
NERVOUS/ANXIOUS: 1
BACK PAIN: 0
BLURRED VISION: 0
MYALGIAS: 0
CONSTIPATION: 0
PHOTOPHOBIA: 0
TINGLING: 0
ABDOMINAL PAIN: 0
EYE PAIN: 0
TREMORS: 0
COUGH: 0
SORE THROAT: 0
HEADACHES: 0
DEPRESSION: 0

## 2018-07-09 ASSESSMENT — COGNITIVE AND FUNCTIONAL STATUS - GENERAL
WALKING IN HOSPITAL ROOM: A LITTLE
SUGGESTED CMS G CODE MODIFIER DAILY ACTIVITY: CH
DAILY ACTIVITIY SCORE: 24
SUGGESTED CMS G CODE MODIFIER MOBILITY: CJ
CLIMB 3 TO 5 STEPS WITH RAILING: A LITTLE
MOBILITY SCORE: 22

## 2018-07-09 ASSESSMENT — PAIN SCALES - GENERAL
PAINLEVEL_OUTOF10: 6
PAINLEVEL_OUTOF10: 0
PAINLEVEL_OUTOF10: 4
PAINLEVEL_OUTOF10: 2
PAINLEVEL_OUTOF10: 0

## 2018-07-09 NOTE — CARE PLAN
Problem: Safety  Goal: Will remain free from injury  Outcome: PROGRESSING AS EXPECTED  Pt instructed use of call light OOB or for any other needs; call light in working order and within reach.  Nonskid footwear in place. Pt rounded on frequently.    Problem: Venous Thromboembolism (VTW)/Deep Vein Thrombosis (DVT) Prevention:  Goal: Patient will participate in Venous Thrombosis (VTE)/Deep Vein Thrombosis (DVT)Prevention Measures  Outcome: PROGRESSING AS EXPECTED   07/09/18 1533   OTHER   Pharmacologic Prophylaxis Used LMWH: Enoxaparin(Lovenox)   Mechanical/VTE Prophylaxis   Mechanical Prophylaxis  SCDs, Sequential Compression Device   AV Foot Pumps Refused

## 2018-07-09 NOTE — PROGRESS NOTES
IV ABX hung and infused without issue. Snack and fluids provided per request. FSBS checked and covered per sliding scale. Pain in foot currently 2, declined interventions. Call light in reach.

## 2018-07-09 NOTE — PROGRESS NOTES
Report received from DARIANA Schaefer. Pt alert and oriented x 4. Pt appears to be resting comfortably and is in no apparent distress. Will continue with care.

## 2018-07-09 NOTE — PROGRESS NOTES
Due medications given without issue. Resting in bed. Alert and oriented. Dressing changed bilateral feet by Idania WESTBROOK. No c/o pain at this time. Snack and fluids provided per request. Call light in reach.

## 2018-07-09 NOTE — DISCHARGE PLANNING
"SW spoke to Jeny at [facility], she has declined the pt.      SW met with pt at bedside regarding GH placement.  Pt stated that she, \"would rather live on the street than go to a GH.\"  Pt also stated that, \"group homes are for people who cannot take care of themselves.\"  Pt stated that she does not want to talk any further about going to a GH.    SW spoke to MD regarding the above.  MD will speak with ID regarding transitioning the pt to oral abx and if this is possible.        "

## 2018-07-09 NOTE — PROGRESS NOTES
Call to Kelin HAZEL to verify weight bearing status. RN awaiting callback. Pt resting in bed, eating snPresidio bar. Discussed POC. Pt states no further needs at this time. Will continue with care.

## 2018-07-09 NOTE — PROGRESS NOTES
Report received. Care assumed. Resting with eyes closed. Respirations even and unlabored. No c/o. Nicotine gum provided per request. Call light in reach.

## 2018-07-09 NOTE — FACE TO FACE
Face to Face Note  -  Durable Medical Equipment    Edna Uriarte M.D. - NPI: [id number]  I certify that this patient is under my care and that they had a durable medical equipment(DME)face to face encounter by myself that meets the physician DME face-to-face encounter requirements with this patient on:    Date of encounter:   Patient:                    MRN:                       [facility] 2020  [id number]  1970     The encounter with the patient was in whole, or in part, for the following medical condition, which is the primary reason for durable medical equipment:  Wound Care    I certify that, based on my findings, the following durable medical equipment is medically necessary:  Other DME Equipment - NA.    HOME O2 Saturation Measurements:(Values must be present for Home Oxygen orders)         ,     ,         My Clinical findings support the need for the above equipment due to:  Wound infection    Supporting Symptoms osteomyelitis.

## 2018-07-09 NOTE — PROGRESS NOTES
"[facility]ist Progress Note    Date of Service: 7/9/2018    Chief Complaint  48 y.o. female admitted 6/26/2018 with diabetic foot ulceration osteomyelitis and associated sepsis.     Interval Problem Update  Still refusing med and assessments. Still extremely belligerent to staff.    Persistently belligerent to all staff. Pt states \"I don't need your fucking help!\". Pt  swearing and states she is in too much pain to be polite. Pt asked staff to leave her alone.   ID stating- Anticipate 6 weeks abx-from date of surgery  Stop date 8/8/2018  Preprandial insulin increased to 10 and lantus increase to 30    7/3  No acute overnight events, patient more compliant today.   serosanguinous drainage noted in wound vac.  On IV antibiotics Continue with vancomycin stop date 8/8/2018  Wound care.  Repeat PT/OT eval today.    7/4  Patient comfortable today, in good mood. Patient denies fevers/chills, chest pain, shortness of breath or nausea/vommiting.   D/c Wound vac today per ortho recs.  Continue IV Vancomycin.  Vancomycin will be titrated to serum concentration levels to ensure adequate levels and reduce risk of toxicity  Wound care.    7/5  Patient comfortable, still having lower extremity discomfort. Otherwise Patient denies fevers/chills, chest pain, shortness of breath or nausea/vommiting.    Patient was caught smoking in her room, cigarettes bought by a friend. She was also able to ambulate without assistance.  Continue IV vancomycin  Vancomycin will be titrated to serum concentration levels to ensure adequate levels and reduce risk of toxicity  Continue wound care.    7/6  No acute issues overnight, patient calm and nice.  Continue IV Vancomycin.  Vancomycin will be titrated to serum concentration levels to ensure adequate levels and reduce risk of toxicity  Awaiting for placement.    7/7  Patient comfortable. No acute complaints.   Continue IV vancomycin, will see if oral equivalent is possibility with ID.  Awaiting " placement      No acute events overnight. Patient denies fevers/chills, chest pain, shortness of breath or nausea/vommiting. Only complaints was mild skin rash around abdominal folds. nystatin powder offered.  Awaiting for placement  continue IV vancomycin. Stop date 2018  Wound care.      Patient calm today. No complaints today.  On IV Vancomycin,   Asked ID to see if there is a PO alternative the microbiology results, pending.  Continue wound care  SW working on placement.    Consultants/Specialty  ortho  ID    Disposition  Placement TPH?        Review of Systems   Constitutional: Negative for chills, fever and malaise/fatigue.   HENT: Negative for congestion, hearing loss, sore throat and tinnitus.    Eyes: Negative for blurred vision, double vision, photophobia and pain.   Respiratory: Negative for cough, hemoptysis, sputum production, shortness of breath and stridor.    Cardiovascular: Negative for chest pain, palpitations, orthopnea, claudication and PND.   Gastrointestinal: Negative for abdominal pain, blood in stool, constipation, heartburn, melena, nausea and vomiting.   Genitourinary: Negative for dysuria, frequency and urgency.   Musculoskeletal: Negative for back pain, joint pain, myalgias and neck pain.        Reports foot pain   Skin: Negative for itching and rash.   Neurological: Negative for dizziness, tingling, tremors, sensory change, speech change, weakness and headaches.   Psychiatric/Behavioral: Positive for substance abuse. Negative for depression, hallucinations, memory loss and suicidal ideas. The patient is nervous/anxious and has insomnia.       Physical Exam  Laboratory/Imaging   Hemodynamics  Temp (24hrs), Av.6 °C (97.9 °F), Min:36.2 °C (97.2 °F), Max:36.8 °C (98.3 °F)   Temperature: 36.8 °C (98.3 °F)  Pulse  Av  Min: 64  Max: 125    Blood Pressure: 131/68      Respiratory      Respiration: 16, Pulse Oximetry: 98 %     Work Of Breathing / Effort: Mild  RUL Breath  Sounds: Clear, RML Breath Sounds: Clear, RLL Breath Sounds: Clear, SAUL Breath Sounds: Clear, LLL Breath Sounds: Clear    Fluids    Intake/Output Summary (Last 24 hours) at 07/09/18 1441  Last data filed at 07/09/18 1300   Gross per 24 hour   Intake             2610 ml   Output                0 ml   Net             2610 ml       Nutrition  Orders Placed This Encounter   Procedures   • Diet Order Diabetic     Standing Status:   Standing     Number of Occurrences:   1     Order Specific Question:   Diet:     Answer:   Diabetic [3]     Physical Exam   Constitutional: She is oriented to person, place, and time. She appears well-developed and well-nourished. No distress.   Patient seen and examined  Discussed plan with RN   SHARONT:   Head: Normocephalic and atraumatic.   Right Ear: External ear normal.   Left Ear: External ear normal.   Mouth/Throat: Oropharynx is clear and moist. No oropharyngeal exudate.   Eyes: Conjunctivae are normal. Pupils are equal, round, and reactive to light. Right eye exhibits no discharge. Left eye exhibits no discharge. No scleral icterus.   Neck: Neck supple. No JVD present. No thyromegaly present.   Cardiovascular: Normal rate, regular rhythm, normal heart sounds and intact distal pulses.    No murmur heard.       Pulmonary/Chest: Effort normal and breath sounds normal. No stridor. No respiratory distress. She has no wheezes. She has no rales.   Abdominal: Soft. Bowel sounds are normal. She exhibits no distension. There is no tenderness. There is no rebound.   Musculoskeletal: Normal range of motion. She exhibits no edema or tenderness.   Small 5 mm right great toe base ulceration, dressing intact, in boot     Neurological: She is alert and oriented to person, place, and time. No cranial nerve deficit. Coordination normal.   Skin: Skin is warm and dry. She is not diaphoretic. No erythema.   Psychiatric: Her behavior is normal. Thought content normal. Her mood appears not anxious.   Nursing  note and vitals reviewed.      Recent Labs      07/07/18   0415  07/08/18   0305   WBC  12.4*  13.1*   RBC  3.91*  3.88*   HEMOGLOBIN  12.5  12.6   HEMATOCRIT  36.2*  36.9*   MCV  92.6  95.1   MCH  32.0  32.5   MCHC  34.5  34.1   RDW  47.0  49.7   PLATELETCT  223  203   MPV  9.8  9.9     Recent Labs      07/07/18   0415  07/08/18   0305   SODIUM  130*  135   POTASSIUM  4.3  4.3   CHLORIDE  100  107   CO2  22  22   GLUCOSE  289*  150*   BUN  20  18   CREATININE  0.91  0.87   CALCIUM  8.3*  8.1*                      Assessment/Plan     Morbid obesity ([facility])  Recommend diet and exercise.     Sepsis ([facility])  This is sepsis (without associated acute organ dysfunction).   2/2 DM foot infection with osteomyelitis  Bone cultures +Diphtheroids with osteo, s/p I&D.  Continue IV vancomycin  Vancomycin will be titrated to serum concentration levels to ensure adequate levels and reduce risk of toxicity  Sepsis resolved.       Type 2 diabetes mellitus with neurologic complication ([facility])  Complicated by neuropathy and renal impairment from diabetes.  Cont lantus.  Continue Insulin-sliding scale, accu-checks and hypoglycemia protocol.  A1c:7.7    Methadone dependence ([facility] [place])  Patient denies use of narcotics   Utox + Methadone.  Drug seeking behavior      Diabetic foot infection ([facility] [place])  Hx of left toe amputation.  Now has recurrent osteomyelitis.  s/p I&D and 1st toe amputation.      Bed bug bite  Patient has numerous lesions on her lower extremity consistent with bedbug bites  No bugs noted here.   Patient is homeless      Diabetic neuropathy ([facility])  Declines gabapentin, says she gets swelling in legs from this.       Osteomyelitis ([facility])  S/p OPERATION PERFORMED:  1.  Irrigation with excisional debridement, left diabetic foot ulcer.  2.  Left first ray amputation.  3.  Left gastrocnemius recession.  4.  Arthrotomy, second metatarsophalangeal joint.  5.  Placement of a negative pressure dressing of less than 50 square    centimeters.  Continue wound care.  Continue IV Vancomycin.  Vancomycin will be titrated to serum concentration levels to ensure adequate levels and reduce risk of toxicity Stop date 8/8/2018 ~ tentative.  Asked ID to see if there is a PO equivalent that is as superior.        Bipolar I disorder with gwen (HCC)  Noted to have erratic behavior , currently pleasant.   Refuses seroquel at this time.  Behavior variable.     Leukocytosis  Most likely 2/2 to infection  Slowly improving 13.1<12.4<15.7  CTM    Hypertension  Better controlled  Continue lisinopril 20mg daily.  PRN IV Labetalol if sbp>170        Quality-Core Measures   Reviewed items::  EKG reviewed, Labs reviewed, Medications reviewed and Radiology images reviewed  Hernandez catheter::  No Hernandez  DVT prophylaxis pharmacological::  Heparin  DVT prophylaxis - mechanical:  SCDs  Ulcer Prophylaxis::  Yes  Antibiotics:  Treating active infection/contamination beyond 24 hours perioperative coverage  Assessed for rehabilitation services:  Patient was assess for and/or received rehabilitation services during this hospitalization        The total time spent face to face with this patient was about 35 mins of which 60% of time was spent on counseling, review of records including pertinent lab data and studies, as well as discussing diagnostic evaluation and work up, planned therapeutic interventions and future disposition of care. Where indicated, the assessment and plan reflect discussion of patient with consultants, other healthcare providers, family members, and additional research needed to obtain further information in formulating the plan of care of this patient. Patient also on high risk medication vancomycin, will closely monitor for toxicity

## 2018-07-10 ENCOUNTER — PATIENT OUTREACH (OUTPATIENT)
Dept: HEALTH INFORMATION MANAGEMENT | Facility: OTHER | Age: 48
End: 2018-07-10

## 2018-07-10 ENCOUNTER — HOME HEALTH ADMISSION (OUTPATIENT)
Dept: HOME HEALTH SERVICES | Facility: HOME HEALTHCARE | Age: 48
End: 2018-07-10
Payer: MEDICAID

## 2018-07-10 VITALS
HEIGHT: 66 IN | DIASTOLIC BLOOD PRESSURE: 84 MMHG | WEIGHT: 264.55 LBS | TEMPERATURE: 98.5 F | BODY MASS INDEX: 42.52 KG/M2 | RESPIRATION RATE: 18 BRPM | HEART RATE: 81 BPM | OXYGEN SATURATION: 91 % | SYSTOLIC BLOOD PRESSURE: 168 MMHG

## 2018-07-10 LAB
GLUCOSE BLD-MCNC: 149 MG/DL (ref 65–99)
GLUCOSE BLD-MCNC: 170 MG/DL (ref 65–99)

## 2018-07-10 PROCEDURE — A9270 NON-COVERED ITEM OR SERVICE: HCPCS | Performed by: INTERNAL MEDICINE

## 2018-07-10 PROCEDURE — A9270 NON-COVERED ITEM OR SERVICE: HCPCS | Performed by: HOSPITALIST

## 2018-07-10 PROCEDURE — 700102 HCHG RX REV CODE 250 W/ 637 OVERRIDE(OP): Performed by: HOSPITALIST

## 2018-07-10 PROCEDURE — 700102 HCHG RX REV CODE 250 W/ 637 OVERRIDE(OP): Performed by: INTERNAL MEDICINE

## 2018-07-10 PROCEDURE — 82962 GLUCOSE BLOOD TEST: CPT

## 2018-07-10 PROCEDURE — 99239 HOSP IP/OBS DSCHRG MGMT >30: CPT | Performed by: HOSPITALIST

## 2018-07-10 RX ADMIN — ALPRAZOLAM 0.25 MG: 0.25 TABLET ORAL at 11:35

## 2018-07-10 RX ADMIN — NICOTINE POLACRILEX 2 MG: 2 GUM, CHEWING ORAL at 06:53

## 2018-07-10 RX ADMIN — NICOTINE POLACRILEX 2 MG: 2 GUM, CHEWING ORAL at 04:44

## 2018-07-10 RX ADMIN — LISINOPRIL 20 MG: 20 TABLET ORAL at 06:50

## 2018-07-10 RX ADMIN — NYSTATIN: 100000 CREAM TOPICAL at 06:52

## 2018-07-10 RX ADMIN — NICOTINE POLACRILEX 2 MG: 2 GUM, CHEWING ORAL at 13:34

## 2018-07-10 RX ADMIN — INSULIN LISPRO 3 UNITS: 100 INJECTION, SOLUTION INTRAVENOUS; SUBCUTANEOUS at 06:58

## 2018-07-10 RX ADMIN — NYSTATIN: 100000 POWDER TOPICAL at 06:51

## 2018-07-10 RX ADMIN — IBUPROFEN 800 MG: 200 TABLET, FILM COATED ORAL at 04:43

## 2018-07-10 RX ADMIN — NICOTINE POLACRILEX 2 MG: 2 GUM, CHEWING ORAL at 09:40

## 2018-07-10 RX ADMIN — IBUPROFEN 800 MG: 200 TABLET, FILM COATED ORAL at 11:35

## 2018-07-10 RX ADMIN — NICOTINE 21 MG: 21 PATCH, EXTENDED RELEASE TRANSDERMAL at 06:52

## 2018-07-10 RX ADMIN — LINEZOLID 600 MG: 600 TABLET, FILM COATED ORAL at 06:50

## 2018-07-10 RX ADMIN — MULTIPLE VITAMINS W/ MINERALS TAB 2 TABLET: TAB at 06:49

## 2018-07-10 ASSESSMENT — PAIN SCALES - GENERAL: PAINLEVEL_OUTOF10: 7

## 2018-07-10 NOTE — DISCHARGE PLANNING
Received call from insurance JUSTIN Bustillos stating that all 3 medications have been authorized ( Levemir, Tradjenta, and Zyvox). Notified RAYA Markham of approval. Shahrzad to call pharmacy to have them re-run prescription.

## 2018-07-10 NOTE — DISCHARGE PLANNING
Prescriptions faxed to patient's pharmacy on file, [facility].  SW will await a response for co-pay and prior auth needs.

## 2018-07-10 NOTE — PROGRESS NOTES
Sitting up on the bed, alert and oriented  to person,place and time.left foot splint in place, on room air sat 95%. Complained of foot pain 6/10 at this time. medication given per MAR, refused Seroquel . States understanding of POC.call light at reach.

## 2018-07-10 NOTE — FACE TO FACE
Face to Face Supporting Documentation - Home Health    The encounter with this patient was in whole or in part the primary reason for home health admission.    Date of encounter:   Patient:                    MRN:                       YOB: 2020  Kaylee Ivy  [id number]  1970     Home health to see patient for:  Wound Care    Skilled need for:  Recent Deterioration of Health Status chronic leg wound    Skilled nursing interventions to include:  Wound Care    Homebound status evidenced by:  Needs the assistance of another person in order to leave the home. Leaving home requires a considerable and taxing effort. There is a normal inability to leave the home.    Community Physician to provide follow up care: JENSEN Dean     Optional Interventions? No      I certify the face to face encounter for this home health care referral meets the CMS requirements and the encounter/clinical assessment with the patient was, in whole, or in part, for the medical condition(s) listed above, which is the primary reason for home health care. Based on my clinical findings: the service(s) are medically necessary, support the need for home health care, and the homebound criteria are met.  I certify that this patient has had a face to face encounter by myself.  Joanie Montgomery M.D. - NPI: [id number]

## 2018-07-10 NOTE — DISCHARGE SUMMARY
Discharge Summary    CHIEF COMPLAINT ON ADMISSION  Chief Complaint   Patient presents with   • Foot Pain     L       Reason for Admission  left toe pain     Admission Date  6/26/2018    CODE STATUS  Full Code    HPI & HOSPITAL COURSE  This is a 48 y.o. female here with diabetic foot ulceration with osteomyelitis and associated sepsis on admission.  She was evaluated by infectious disease and orthopedic surgery and taken for operative debridement and ray amputation on 6/27/2018.  She was treated with intravenous vancomycin for the osteomyelitis, cultures only grew light diphtheroids species.  She was treated with IV vancomycin until 7/9/2018, then changed to oral linezolid.  Patient is homeless and was not accepted to any local skilled nursing facility both for this reason and for the fact that she is an extremely belligerent person.  She has been repeatedly belligerent to all staff including physicians.  Patient frequently curses at people trying to help her and repeatedly refuses care.  She obstructs her medical care with her behavior as well.  She refuses psychiatric evaluation and treatment.  However the patient is alert oriented and competent to make her own decisions.  Therefore she is discharged to the community, she has secured a motel room to live and for the time being and she has been provided with resources for transportation to obtain her prescriptions and to be transported to her wound care appointments.       Therefore, she is discharged in guarded and stable condition to home with close outpatient follow-up.    The patient met 2-midnight criteria for an inpatient stay at the time of discharge.    Discharge Date  7/10/2018    FOLLOW UP ITEMS POST DISCHARGE  Wound care clinic, primary care and also provided with follow-up appointment with orthopedics.    DISCHARGE DIAGNOSES  Principal Problem:    Diabetic foot infection (HCC) POA: Unknown  Active Problems:    Morbid obesity (HCC) POA: Unknown    Type 2  diabetes mellitus with neurologic complication (HCC) POA: Unknown    Methadone dependence (HCC) POA: Unknown    Bed bug bite POA: Unknown    Diabetic neuropathy (HCC) POA: Unknown    Osteomyelitis (HCC) POA: Unknown    Bipolar I disorder with gwen (HCC) POA: Unknown    Leukocytosis POA: Unknown    Hypertension POA: Unknown  Resolved Problems:    Sepsis (HCC) POA: Unknown      FOLLOW UP  Future Appointments  Date Time Provider Department Center   7/11/2018 2:30 PM BERTHA Farfan [address] [place] [address]   7/16/2018 10:30 AM BERTHA Anderson [address] [place] [address]   7/19/2018 10:30 AM BERTHA Anderson [address] [place] [address]   7/24/2018 10:30 AM BERTHA Reyes [address] [place] [address]   7/26/2018 10:00 AM BERTHA Anderson [address] [place] [address]   7/30/2018 11:00 AM BERTHA Farfan [address] [place] [address]   8/2/2018 10:30 AM BERTHA Anderson [address] [place] [address]     JENSEN Dean  [address]  [facility] [address]  [facility] [address]  [phone]    Go on 7/12/2018  Please arrive at 12:45 pm for a 1:00 pm appointment with primary care at [facility].      MEDICATIONS ON DISCHARGE     Medication List      START taking these medications      Instructions   Amoxicillin 500 MG Tabs  Start taking on:  7/23/2018   Take 2 Tabs by mouth 2 Times a Day for 14 days.  Dose:  1000 mg     linezolid 600 MG Tabs  Commonly known as:  ZYVOX   Take 1 Tab by mouth every 12 hours for 30 days.  Dose:  600 mg        CHANGE how you take these medications      Instructions   insulin detemir 100 UNIT/ML Soln  What changed:  when to take this  Commonly known as:  LEVEMIR   Inject 16 Units as instructed 2 times a day for 30 days.  Dose:  16 Units     lisinopril 20 MG Tabs  What changed:  · medication strength  · how much to take  Commonly known as:  PRINIVIL   Take 1 Tab by mouth every day.  Dose:  20 mg     simvastatin 20 MG Tabs  What changed:  when to take this  Commonly known as:  ZOCOR   Take 1 Tab by mouth every evening.  Dose:  20 mg        CONTINUE taking these medications       Instructions   B COMPLEX PO   Take 1 Tab by mouth every day.  Dose:  1 Tab     C 500/BASHIR HIPS PO   Take 1 Tab by mouth every day.  Dose:  1 Tab     linagliptin 5 MG Tabs tablet  Commonly known as:  TRADJENTA   Take 1 Tab by mouth every day.  Dose:  5 mg        STOP taking these medications    metformin 1000 MG tablet  Commonly known as:  GLUCOPHAGE     sulfamethoxazole-trimethoprim 800-160 MG tablet  Commonly known as:  BACTRIM DS     therapeutic multivitamin-minerals Tabs            Allergies  No Known Allergies    DIET  Orders Placed This Encounter   Procedures   • Diet Order Diabetic     Standing Status:   Standing     Number of Occurrences:   1     Order Specific Question:   Diet:     Answer:   Diabetic [3]       ACTIVITY  As tolerated.  Weight bearing as tolerated  Using a boot on the left leg.  CONSULTATIONS  Orthopedic surgery Dr. Merlos  Infectious disease Dr. Daniels    PROCEDURES  DATE OF SERVICE:  06/27/2018     PREOPERATIVE DIAGNOSES:  1.  Right diabetic foot ulcer.  2.  Right first, second, and third metatarsal head osteomyelitis.     POSTOPERATIVE DIAGNOSES:  1.  Left diabetic foot ulcer.  2.  Left first metatarsal head osteomyelitis.  3.  Gastroc tightness.     OPERATION PERFORMED:  1.  Irrigation with excisional debridement, left diabetic foot ulcer.  2.  Left first ray amputation.  3.  Left gastrocnemius recession.  4.  Arthrotomy, second metatarsophalangeal joint.  5.  Placement of a negative pressure dressing of less than 50 square   centimeters.     SURGEON:  Jose Manuel Merlos MD    LABORATORY  Lab Results   Component Value Date    SODIUM 135 07/08/2018    POTASSIUM 4.3 07/08/2018    CHLORIDE 107 07/08/2018    CO2 22 07/08/2018    GLUCOSE 150 (H) 07/08/2018    BUN 18 07/08/2018    CREATININE 0.87 07/08/2018        Lab Results   Component Value Date    WBC 13.1 (H) 07/08/2018    HEMOGLOBIN 12.6 07/08/2018    HEMATOCRIT 36.9 (L) 07/08/2018    PLATELETCT 203 07/08/2018        Total time of  the discharge process exceeds 80 minutes.

## 2018-07-10 NOTE — DISCHARGE PLANNING
SW called [facility] and left a message that we need to cancel the HH referral and the patient is going to go to the wound clinic per patient choice.  RAYA provided patient with cab voucher for patient to get to [place] [address] on [facility] [address] as patient stated that she has rented herself a room for the night. Scripts faxed into [facility] on [address].

## 2018-07-10 NOTE — DISCHARGE PLANNING
Upon utilization review, patient noted to be on the following medications that could potentially require prior authorization if prescribed at discharge: Zyvox.  If it is anticipated that patient will require these medications at discharge, beginning the prescription prior auth process in advance to anticipated discharge could assist in preventing delays when patient is medically cleared to be discharged from the hospital.

## 2018-07-10 NOTE — DISCHARGE PLANNING
Received call from RAYA Markham requesting Prior Auth for Levemir, Tradjenta, and Zyvox. Called and requested prior auth from insurance JUSTIN Bustillos. Tressa working on obtaining prior auth at this time. Notified RAYA Hedrick of status of PA.

## 2018-07-10 NOTE — CARE PLAN
Problem: Pain Management  Goal: Pain level will decrease to patient's comfort goal  Outcome: PROGRESSING AS EXPECTED  Taught pt 0-10 pain scale and  non pharmacological method of pain mgt, encouraged to verbalize when in pain. Administered PRN pain med as needed.    Problem: Skin Integrity  Goal: Risk for impaired skin integrity will decrease  Outcome: PROGRESSING AS EXPECTED  Kept dry and clean. place pillows on bony prominences to prevent skin breakdown. Left foot elevated on a pillow.

## 2018-07-10 NOTE — PROGRESS NOTES
Patient to be discharged, kristen picc removed, awaiting patient address to set up transportation.

## 2018-07-10 NOTE — DISCHARGE PLANNING
Received Choice form at 5741  Agency/Facility Name: [facility]  Referral sent per Choice form at 4440.  Choice obtained by NINO Markham.

## 2018-07-10 NOTE — PROGRESS NOTES
Bedside report given to  Omar WESTBROOK.Pt  Resting in the bed.Safety precautions in place and all the lines remain patent.

## 2018-07-11 ENCOUNTER — PATIENT OUTREACH (OUTPATIENT)
Dept: HEALTH INFORMATION MANAGEMENT | Facility: OTHER | Age: 48
End: 2018-07-11

## 2018-07-11 NOTE — PROGRESS NOTES
Chart reviewed.  Pt was discharged from [facility] 7/10/18 and does not have a contact phone number listed in Epic record.  Discharge outreach letter mailed to pt.

## 2018-07-11 NOTE — DISCHARGE INSTRUCTIONS
Discharge Instructions    Discharged to other by medical transportation with escort. Discharged via wheelchair, hospital escort: Yes.  Special equipment needed: Not Applicable    Be sure to schedule a follow-up appointment with your primary care doctor or any specialists as instructed.     Discharge Plan:   Diet Plan: Discussed  Activity Level: Discussed  Smoking Cessation Offered: Patient Refused  Confirmed Follow up Appointment: Appointment Scheduled  Confirmed Symptoms Management: Discussed  Medication Reconciliation Updated: Yes  Influenza Vaccine Indication: Not indicated: Previously immunized this influenza season and > 8 years of age    I understand that a diet low in cholesterol, fat, and sodium is recommended for good health. Unless I have been given specific instructions below for another diet, I accept this instruction as my diet prescription.   Other diet: diabetic    Special Instructions: None    · Is patient discharged on Warfarin / Coumadin?   No     Sepsis, Adult  Sepsis is a serious infection of your blood or tissues that affects your whole body. The infection that causes sepsis may be bacterial, viral, fungal, or parasitic. Sepsis may be life threatening. Sepsis can cause your blood pressure to drop. This may result in shock. Shock causes your central nervous system and your organs to stop working correctly.  What increases the risk?  Sepsis can happen in anyone, but it is more likely to happen in people who have weakened immune systems.  What are the signs or symptoms?  Symptoms of sepsis can include:  · Fever or low body temperature (hypothermia).  · Rapid breathing (hyperventilation).  · Chills.  · Rapid heartbeat (tachycardia).  · Confusion or light-headedness.  · Trouble breathing.  · Urinating much less than usual.  · Cool, clammy skin or red, flushed skin.  · Other problems with the heart, kidneys, or brain.  How is this diagnosed?  Your health care provider will likely do tests to look for  an infection, to see if the infection has spread to your blood, and to see how serious your condition is. Tests can include:  · Blood tests, including cultures of your blood.  · Cultures of other fluids from your body, such as:  ¨ Urine.  ¨ Pus from wounds.  ¨ Mucus coughed up from your lungs.  · Urine tests other than cultures.  · X-ray exams or other imaging tests.  How is this treated?  Treatment will begin with elimination of the source of infection. If your sepsis is likely caused by a bacterial or fungal infection, you will be given antibiotic or antifungal medicines.  You may also receive:  · Oxygen.  · Fluids through an IV tube.  · Medicines to increase your blood pressure.  · A machine to clean your blood (dialysis) if your kidneys fail.  · A machine to help you breathe if your lungs fail.  Get help right away if:  You get an infection or develop any of the signs and symptoms of sepsis after surgery or a hospitalization.  This information is not intended to replace advice given to you by your health care provider. Make sure you discuss any questions you have with your health care provider.  Document Released: 09/15/2004 Document Revised: 05/25/2017 Document Reviewed: 08/25/2014  [employer] Interactive Patient Education © 2017 [employer]    Amoxicillin capsules or tablets  What is this medicine?  AMOXICILLIN (a mox i CASEY in) is a penicillin antibiotic. It is used to treat certain kinds of bacterial infections. It will not work for colds, flu, or other viral infections.  This medicine may be used for other purposes; ask your health care provider or pharmacist if you have questions.  COMMON BRAND NAME(S): Amoxil, Moxilin, Sumox, Trimox  What should I tell my health care provider before I take this medicine?  They need to know if you have any of these conditions:  -asthma  -kidney disease  -an unusual or allergic reaction to amoxicillin, other penicillins, cephalosporin antibiotics, other medicines, foods,  dyes, or preservatives  -pregnant or trying to get pregnant  -breast-feeding  How should I use this medicine?  Take this medicine by mouth with a glass of water. Follow the directions on your prescription label. You may take this medicine with food or on an empty stomach. Take your medicine at regular intervals. Do not take your medicine more often than directed. Take all of your medicine as directed even if you think your are better. Do not skip doses or stop your medicine early.  Talk to your pediatrician regarding the use of this medicine in children. While this drug may be prescribed for selected conditions, precautions do apply.  Overdosage: If you think you have taken too much of this medicine contact a poison control center or emergency room at once.  NOTE: This medicine is only for you. Do not share this medicine with others.  What if I miss a dose?  If you miss a dose, take it as soon as you can. If it is almost time for your next dose, take only that dose. Do not take double or extra doses.  What may interact with this medicine?  -amiloride  -birth control pills  -chloramphenicol  -macrolides  -probenecid  -sulfonamides  -tetracyclines  This list may not describe all possible interactions. Give your health care provider a list of all the medicines, herbs, non-prescription drugs, or dietary supplements you use. Also tell them if you smoke, drink alcohol, or use illegal drugs. Some items may interact with your medicine.  What should I watch for while using this medicine?  Tell your doctor or health care professional if your symptoms do not improve in 2 or 3 days. Take all of the doses of your medicine as directed. Do not skip doses or stop your medicine early.  If you are diabetic, you may get a false positive result for sugar in your urine with certain brands of urine tests. Check with your doctor.  Do not treat diarrhea with over-the-counter products. Contact your doctor if you have diarrhea that lasts  more than 2 days or if the diarrhea is severe and watery.  What side effects may I notice from receiving this medicine?  Side effects that you should report to your doctor or health care professional as soon as possible:  -allergic reactions like skin rash, itching or hives, swelling of the face, lips, or tongue  -breathing problems  -dark urine  -redness, blistering, peeling or loosening of the skin, including inside the mouth  -seizures  -severe or watery diarrhea  -trouble passing urine or change in the amount of urine  -unusual bleeding or bruising  -unusually weak or tired  -yellowing of the eyes or skin  Side effects that usually do not require medical attention (report to your doctor or health care professional if they continue or are bothersome):  -dizziness  -headache  -stomach upset  -trouble sleeping  This list may not describe all possible side effects. Call your doctor for medical advice about side effects. You may report side effects to FDA at 2-212-FDA-4989.  Where should I keep my medicine?  Keep out of the reach of children.  Store between 68 and 77 degrees F (20 and 25 degrees C). Keep bottle closed tightly. Throw away any unused medicine after the expiration date.  NOTE: This sheet is a summary. It may not cover all possible information. If you have questions about this medicine, talk to your doctor, pharmacist, or health care provider.  © 2018 [employer] (2009-03-10 14:10:59)    Linezolid tablets  What is this medicine?  LINEZOLID (li NE zoh lid) is an oxazolidinone antibiotic. It is used to treat certain kinds of bacterial infections. It will not work for colds, flu, or other viral infections.  This medicine may be used for other purposes; ask your health care provider or pharmacist if you have questions.  COMMON BRAND NAME(S): Zyvox  What should I tell my health care provider before I take this medicine?  They need to know if you have any of these  conditions:  -  cancer  -diabetes  -  heart disease  -  high blood pressure  -  kidney disease  -  pheochromocytoma  -  untreated thyroid disease  -  an unusual or allergic reaction to linezolid, other antibiotics or medicines, foods, dyes, or preservatives  -  pregnant or trying to get pregnant  -  breast-feeding  How should I use this medicine?  Take this medicine by mouth with a glass of water. Follow the directions on the prescription label. Take with food or on an empty stomach. Take your medicine at regular intervals. Do not take your medicine more often than directed. Take all of your medicine as directed even if you think your are better. Do not skip doses or stop your medicine early.  Talk to your pediatrician regarding the use of this medicine in children. While this drug may be prescribed for selected conditions, precautions do apply.  Overdosage: If you think you have taken too much of this medicine contact a poison control center or emergency room at once.  NOTE: This medicine is only for you. Do not share this medicine with others.  What if I miss a dose?  If you miss a dose, take it as soon as you can. If it is almost time for your next dose, take only that dose. Do not take double or extra doses.  What may interact with this medicine?  Do not take this medicine with any of the following medications:  -bupropion  -certain medicines for depression, anxiety, or psychotic disturbances  -doxepin  -fluoxetine  -furazolidone  -green tea  -MAOIs like Carbex, Eldepryl, Marplan, Nardil, and Parnate  -milnacipran  -procarbazine  -rasagiline  -selegiline  -Stormy's wort  -tryptophan  This medicine may also interact with the following medications:  -birth control pills  -medicines for allergies or colds like phenylpropanolamine, pseudoephedrine  -medicines for blood pressure  -stimulant medicines for attention disorders, weight loss, or to stay awake  This list may not describe all possible interactions. Give  your health care provider a list of all the medicines, herbs, non-prescription drugs, or dietary supplements you use. Also tell them if you smoke, drink alcohol, or use illegal drugs. Some items may interact with your medicine.  What should I watch for while using this medicine?  Tell your doctor or health care professional if your symptoms do not begin to improve or if you get new symptoms.  You will need to be on a special diet while taking this medicine. Ask your doctor or health care professional for a list of foods that you should try to avoid. This includes, but is not limited to, smoked or processed meats, aged cheeses, soy sauce, red elaine and beer.  Do not treat diarrhea with over-the-counter products. Contact your doctor if you have diarrhea that lasts more than 2 days or if the diarrhea is severe and watery.  What side effects may I notice from receiving this medicine?  Side effects that you should report to your doctor or health care professional as soon as possible:  -allergic reactions like skin rash, itching or hives, swelling of the face, lips, or tongue  -breathing problems  -burning, numbness, or tingling  -changes in vision  -confused, restless  -discolored, sore mouth  -fever  -irregular heart beat, blood pressure  -seizures  -tremor, trouble walking  -unusual bleeding or bruising  -unusually weak or tired  Side effects that usually do not require medical attention (report to your doctor or health care professional if they continue or are bothersome):  -changes in taste  -constipation or diarrhea  -dizzy  -headache  -nausea, vomiting  -stomach upset  -trouble sleeping  -vaginal itch, irritation  This list may not describe all possible side effects. Call your doctor for medical advice about side effects. You may report side effects to FDA at 2-246-FDA-8126.  Where should I keep my medicine?  Keep out of the reach of children.  Store at room temperature between 15 and 30 degrees C (59 and 86  degrees F). Keep container tightly closed to protect from light and moisture. Throw away any unused medicine after the expiration date.  NOTE: This sheet is a summary. It may not cover all possible information. If you have questions about this medicine, talk to your doctor, pharmacist, or health care provider.  © 2018 [employer]/Gold Standard (2014-07-25 14:02:20)      Depression / Suicide Risk    As you are discharged from this [facility] facility, it is important to learn how to keep safe from harming yourself.    Recognize the warning signs:  · Abrupt changes in personality, positive or negative- including increase in energy   · Giving away possessions  · Change in eating patterns- significant weight changes-  positive or negative  · Change in sleeping patterns- unable to sleep or sleeping all the time   · Unwillingness or inability to communicate  · Depression  · Unusual sadness, discouragement and loneliness  · Talk of wanting to die  · Neglect of personal appearance   · Rebelliousness- reckless behavior  · Withdrawal from people/activities they love  · Confusion- inability to concentrate     If you or a loved one observes any of these behaviors or has concerns about self-harm, here's what you can do:  · Talk about it- your feelings and reasons for harming yourself  · Remove any means that you might use to hurt yourself (examples: pills, rope, extension cords, firearm)  · Get professional help from the community (Mental Health, Substance Abuse, psychological counseling)  · Do not be alone:Call your Safe Contact- someone whom you trust who will be there for you.  · Call your local CRISIS HOTLINE 134-2718 or 291-744-5758  · Call your local Children's Mobile Crisis Response Team [place] (085) 335-4114 or www.OneClass  · Call the toll free National Suicide Prevention Hotlines   · National Suicide Prevention Lifeline 072-370-WNUT (7314)  · National Hope Line Network 800-SUICIDE (346-2439)

## 2018-07-11 NOTE — LETTER
Kaylee Ivy  [address] [place] [address]    July 11, 2018      Dear Kaylee Ivy,    [facility] wants to ensure your discharge home is safe and you or your loved ones have had all of your questions answered regarding your care after you leave the hospital.    Our discharge team was unsuccessful in our attempts to contact you telephonically and we wanted to be sure that you had a list of resources and contact information should you have any questions regarding your hospital stay, follow-up instructions, or active medical symptoms.    Questions or Concerns Regarding… Contact   Medical Questions Related to Your Discharge  (7 days a week, 8am-5pm) Contact a Nurse Care Coordinator   [phone]   Medical Questions Not Related to Your Discharge  (24 hours a day / 7 days a week)  Contact the Nurse Health Line   [phone]    Medications or Discharge Instructions Refer to your discharge packet   or contact your -[phone]   Follow-up Appointment(s) Schedule your appointment via [employer]mate   or contact Scheduling [phone]   Billing Review your statement via Paymate  or contact Billing [phone]   Medical Records Review your records via [employer]mate   or contact Medical Records [phone]     You can also easily access your medical information, test results and upcoming appointments via the [employer]mate free online health management tool. You can learn more and sign up at [employer]Paymate. For assistance setting up your Paymate account, please call [phone].    Once again, we want to ensure your discharge home is safe and that you have a clear understanding of any next steps in your care. If you have any questions or concerns, please do not hesitate to contact us, we are here for you. Thank you for choosing [facility] for your healthcare needs.    Sincerely,      Your [facility] Healthcare Team

## 2018-07-11 NOTE — PROGRESS NOTES
"Went to pt's room to explain discharge instructions to pt before leaving. When explaining pt's follow-up appointments, pt began to become visually distressed saying \"So what am I supposed to do cancel all these appointments\"and \"I don't need you people to explain do that for me, I can do that myself, \".When during the continuation medication portion and when shown the medication insulin Detemir, pt started crying saying \"Why won't these people listen, this is bullshit, I've been on Levemir for 4 years why wont these people give me what I asked for\" and then threw the discharge papers onto the table with disgust, pt was then explained that Detemir is Levemir and pt did not believe until Charge nurse handed over medical scripts saying Detemir(Levamir), when going over the medication instructions pt was physically distress \"Where the fuck is the other antibiotic they gave me last night the Dr.s said they'd give it to me and see how my body takes it. Amoxicillin isn't anything it's a weak antibiotic\" Pt was explained that \"these antibiotics were the medications the Dr.s want you to continue because it what they think will help you best.\" Pt was uncooperative, unwilling to listen to the discharge instructions. Pt then was asked to sign the hospitals copy of discharge instructions stating it \"Is only saying we went over the instructions with you and you understand.\" Pt said \"No I refused, I'm not signing that, it's no offense to you Johnny but I'm not doing that shit.\" Pt the was escorted by wheel chair with security to entrance for taxi .  "

## 2020-07-11 ENCOUNTER — HOSPITAL ENCOUNTER (OUTPATIENT)
Dept: RADIOLOGY | Facility: MEDICAL CENTER | Age: 50
End: 2020-07-11
Attending: ORTHOPAEDIC SURGERY
Payer: MEDICAID

## 2020-07-11 DIAGNOSIS — S91.342A PUNCTURE WOUND WITH FOREIGN BODY, LEFT FOOT, INITIAL ENCOUNTER: ICD-10-CM

## 2020-07-11 DIAGNOSIS — M79.672 PAIN IN LEFT FOOT: ICD-10-CM

## 2020-07-11 PROCEDURE — 73718 MRI LOWER EXTREMITY W/O DYE: CPT | Mod: LT

## 2020-08-18 ENCOUNTER — OFFICE VISIT (OUTPATIENT)
Dept: INFECTIOUS DISEASES | Facility: MEDICAL CENTER | Age: 50
End: 2020-08-18
Payer: MEDICAID

## 2020-08-18 VITALS
OXYGEN SATURATION: 95 % | TEMPERATURE: 96.8 F | HEIGHT: 67 IN | RESPIRATION RATE: 16 BRPM | WEIGHT: 254 LBS | SYSTOLIC BLOOD PRESSURE: 132 MMHG | HEART RATE: 112 BPM | BODY MASS INDEX: 39.87 KG/M2 | DIASTOLIC BLOOD PRESSURE: 80 MMHG

## 2020-08-18 DIAGNOSIS — E11.628 DIABETIC FOOT INFECTION (HCC): ICD-10-CM

## 2020-08-18 DIAGNOSIS — L08.9 DIABETIC FOOT INFECTION (HCC): ICD-10-CM

## 2020-08-18 DIAGNOSIS — E11.49 OTHER DIABETIC NEUROLOGICAL COMPLICATION ASSOCIATED WITH TYPE 2 DIABETES MELLITUS (HCC): ICD-10-CM

## 2020-08-18 DIAGNOSIS — E11.40 TYPE 2 DIABETES MELLITUS WITH DIABETIC NEUROPATHY, UNSPECIFIED WHETHER LONG TERM INSULIN USE (HCC): ICD-10-CM

## 2020-08-18 DIAGNOSIS — F31.10 BIPOLAR I DISORDER WITH MANIA (HCC): ICD-10-CM

## 2020-08-18 DIAGNOSIS — E66.01 MORBID OBESITY (HCC): ICD-10-CM

## 2020-08-18 PROBLEM — M86.9 OSTEOMYELITIS (HCC): Status: RESOLVED | Noted: 2018-06-27 | Resolved: 2020-08-18
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
## 2020-08-18 PROCEDURE — 99204 OFFICE O/P NEW MOD 45 MIN: CPT | Performed by: INTERNAL MEDICINE

## 2020-08-18 RX ORDER — CLINDAMYCIN HYDROCHLORIDE 300 MG/1
300 CAPSULE ORAL 3 TIMES DAILY
COMMUNITY
End: 2020-11-27

## 2020-08-18 RX ORDER — OMEPRAZOLE 40 MG/1
40 CAPSULE, DELAYED RELEASE ORAL EVERY EVENING
COMMUNITY

## 2020-08-18 RX ORDER — TRIAMCINOLONE ACETONIDE OINTMENT USP, 0.05% 0.5 MG/G
OINTMENT TOPICAL
COMMUNITY

## 2020-08-18 RX ORDER — ALOGLIPTIN 25 MG/1
TABLET, FILM COATED ORAL EVERY EVENING
COMMUNITY

## 2020-08-18 RX ORDER — GABAPENTIN 300 MG/1
300 CAPSULE ORAL 3 TIMES DAILY
COMMUNITY

## 2020-08-18 RX ORDER — LOSARTAN POTASSIUM 50 MG/1
50 TABLET ORAL EVERY EVENING
COMMUNITY

## 2020-08-18 RX ORDER — IBUPROFEN 600 MG/1
600 TABLET ORAL EVERY 6 HOURS PRN
COMMUNITY

## 2020-08-18 ASSESSMENT — PAIN SCALES - GENERAL: PAINLEVEL: NO PAIN

## 2020-10-01 ENCOUNTER — HOSPITAL ENCOUNTER (OUTPATIENT)
Facility: MEDICAL CENTER | Age: 50
End: 2020-10-01
Attending: ORTHOPAEDIC SURGERY | Admitting: ORTHOPAEDIC SURGERY
Payer: MEDICAID

## 2020-10-13 NOTE — PROGRESS NOTES
Pt requested to be given only 15 units of Lantus instead of 30 units. .  Young and I wasted 15 units of Lantus.    Spontaneous, unlabored and symmetrical

## 2020-10-20 ENCOUNTER — HOSPITAL ENCOUNTER (OUTPATIENT)
Facility: MEDICAL CENTER | Age: 50
End: 2020-10-20
Attending: ORTHOPAEDIC SURGERY | Admitting: ORTHOPAEDIC SURGERY
Payer: MEDICAID

## 2020-10-26 ENCOUNTER — APPOINTMENT (OUTPATIENT)
Dept: ADMISSIONS | Facility: MEDICAL CENTER | Age: 50
End: 2020-10-26
Payer: MEDICAID

## 2020-11-27 ENCOUNTER — PRE-ADMISSION TESTING (OUTPATIENT)
Dept: ADMISSIONS | Facility: MEDICAL CENTER | Age: 50
End: 2020-11-27
Attending: ORTHOPAEDIC SURGERY
Payer: MEDICAID

## 2020-11-27 DIAGNOSIS — Z01.810 PRE-OPERATIVE CARDIOVASCULAR EXAMINATION: ICD-10-CM

## 2020-11-27 DIAGNOSIS — Z01.812 PRE-OPERATIVE LABORATORY EXAMINATION: ICD-10-CM

## 2020-11-27 LAB
ANION GAP SERPL CALC-SCNC: 10 MMOL/L (ref 7–16)
BUN SERPL-MCNC: 27 MG/DL (ref 8–22)
CALCIUM SERPL-MCNC: 9.5 MG/DL (ref 8.5–10.5)
CHLORIDE SERPL-SCNC: 98 MMOL/L (ref 96–112)
CO2 SERPL-SCNC: 22 MMOL/L (ref 20–33)
COVID ORDER STATUS COVID19: NORMAL
CREAT SERPL-MCNC: 1.15 MG/DL (ref 0.5–1.4)
EKG IMPRESSION: NORMAL
ERYTHROCYTE [DISTWIDTH] IN BLOOD BY AUTOMATED COUNT: 44 FL (ref 35.9–50)
EST. AVERAGE GLUCOSE BLD GHB EST-MCNC: 217 MG/DL
GLUCOSE SERPL-MCNC: 183 MG/DL (ref 65–99)
HBA1C MFR BLD: 9.2 % (ref 0–5.6)
HCT VFR BLD AUTO: 42 % (ref 37–47)
HGB BLD-MCNC: 14.2 G/DL (ref 12–16)
MCH RBC QN AUTO: 30.4 PG (ref 27–33)
MCHC RBC AUTO-ENTMCNC: 33.8 G/DL (ref 33.6–35)
MCV RBC AUTO: 89.9 FL (ref 81.4–97.8)
PLATELET # BLD AUTO: 244 K/UL (ref 164–446)
PMV BLD AUTO: 10.2 FL (ref 9–12.9)
POTASSIUM SERPL-SCNC: 5 MMOL/L (ref 3.6–5.5)
RBC # BLD AUTO: 4.67 M/UL (ref 4.2–5.4)
SARS-COV-2 RNA RESP QL NAA+PROBE: NOTDETECTED
SODIUM SERPL-SCNC: 130 MMOL/L (ref 135–145)
SPECIMEN SOURCE: NORMAL
WBC # BLD AUTO: 12.4 K/UL (ref 4.8–10.8)

## 2020-11-27 PROCEDURE — 85027 COMPLETE CBC AUTOMATED: CPT

## 2020-11-27 PROCEDURE — 36415 COLL VENOUS BLD VENIPUNCTURE: CPT

## 2020-11-27 PROCEDURE — 93005 ELECTROCARDIOGRAM TRACING: CPT

## 2020-11-27 PROCEDURE — C9803 HOPD COVID-19 SPEC COLLECT: HCPCS

## 2020-11-27 PROCEDURE — 83036 HEMOGLOBIN GLYCOSYLATED A1C: CPT

## 2020-11-27 PROCEDURE — U0003 INFECTIOUS AGENT DETECTION BY NUCLEIC ACID (DNA OR RNA); SEVERE ACUTE RESPIRATORY SYNDROME CORONAVIRUS 2 (SARS-COV-2) (CORONAVIRUS DISEASE [COVID-19]), AMPLIFIED PROBE TECHNIQUE, MAKING USE OF HIGH THROUGHPUT TECHNOLOGIES AS DESCRIBED BY CMS-2020-01-R: HCPCS

## 2020-11-27 PROCEDURE — 80048 BASIC METABOLIC PNL TOTAL CA: CPT

## 2020-11-27 PROCEDURE — 93010 ELECTROCARDIOGRAM REPORT: CPT | Performed by: INTERNAL MEDICINE

## 2020-11-27 RX ORDER — BUPROPION HYDROCHLORIDE 100 MG/1
100 TABLET, EXTENDED RELEASE ORAL EVERY EVENING
COMMUNITY

## 2020-11-27 RX ORDER — PHENOL 1.4 %
10-30 AEROSOL, SPRAY (ML) MUCOUS MEMBRANE PRN
COMMUNITY

## 2020-11-27 RX ORDER — AMOXICILLIN 500 MG/1
500 CAPSULE ORAL 2 TIMES DAILY
COMMUNITY

## 2020-11-30 ENCOUNTER — ANESTHESIA EVENT (OUTPATIENT)
Dept: SURGERY | Facility: MEDICAL CENTER | Age: 50
End: 2020-11-30
Payer: MEDICAID

## 2020-11-30 NOTE — OR NURSING
Dr. Mcmillan's surgery scheduler notified (Shawna) that patient stated during pre-admit appointment that she had dental extractions on 11- and was currently taking Amoxicillin 500mg. BID. Also stated that she was working on a ride home post op.

## 2020-12-01 ENCOUNTER — ANESTHESIA (OUTPATIENT)
Dept: SURGERY | Facility: MEDICAL CENTER | Age: 50
End: 2020-12-01
Payer: MEDICAID

## 2020-12-01 ENCOUNTER — HOSPITAL ENCOUNTER (OUTPATIENT)
Facility: MEDICAL CENTER | Age: 50
End: 2020-12-01
Attending: ORTHOPAEDIC SURGERY | Admitting: ORTHOPAEDIC SURGERY
Payer: MEDICAID

## 2020-12-01 VITALS
OXYGEN SATURATION: 95 % | HEIGHT: 67 IN | SYSTOLIC BLOOD PRESSURE: 125 MMHG | HEART RATE: 85 BPM | RESPIRATION RATE: 16 BRPM | TEMPERATURE: 99 F | WEIGHT: 264.11 LBS | DIASTOLIC BLOOD PRESSURE: 58 MMHG | BODY MASS INDEX: 41.45 KG/M2

## 2020-12-01 LAB
GLUCOSE BLD-MCNC: 162 MG/DL (ref 65–99)
GLUCOSE BLD-MCNC: 189 MG/DL (ref 65–99)
PATHOLOGY CONSULT NOTE: NORMAL

## 2020-12-01 PROCEDURE — 700111 HCHG RX REV CODE 636 W/ 250 OVERRIDE (IP): Performed by: ANESTHESIOLOGY

## 2020-12-01 PROCEDURE — 160025 RECOVERY II MINUTES (STATS): Performed by: ORTHOPAEDIC SURGERY

## 2020-12-01 PROCEDURE — 501838 HCHG SUTURE GENERAL: Performed by: ORTHOPAEDIC SURGERY

## 2020-12-01 PROCEDURE — 88307 TISSUE EXAM BY PATHOLOGIST: CPT

## 2020-12-01 PROCEDURE — A6223 GAUZE >16<=48 NO W/SAL W/O B: HCPCS | Performed by: ORTHOPAEDIC SURGERY

## 2020-12-01 PROCEDURE — 700111 HCHG RX REV CODE 636 W/ 250 OVERRIDE (IP): Performed by: ORTHOPAEDIC SURGERY

## 2020-12-01 PROCEDURE — 160002 HCHG RECOVERY MINUTES (STAT): Performed by: ORTHOPAEDIC SURGERY

## 2020-12-01 PROCEDURE — 700101 HCHG RX REV CODE 250: Performed by: ORTHOPAEDIC SURGERY

## 2020-12-01 PROCEDURE — 160029 HCHG SURGERY MINUTES - 1ST 30 MINS LEVEL 4: Performed by: ORTHOPAEDIC SURGERY

## 2020-12-01 PROCEDURE — 160009 HCHG ANES TIME/MIN: Performed by: ORTHOPAEDIC SURGERY

## 2020-12-01 PROCEDURE — 700102 HCHG RX REV CODE 250 W/ 637 OVERRIDE(OP): Performed by: ANESTHESIOLOGY

## 2020-12-01 PROCEDURE — 87075 CULTR BACTERIA EXCEPT BLOOD: CPT

## 2020-12-01 PROCEDURE — A9270 NON-COVERED ITEM OR SERVICE: HCPCS | Performed by: ANESTHESIOLOGY

## 2020-12-01 PROCEDURE — 87070 CULTURE OTHR SPECIMN AEROBIC: CPT

## 2020-12-01 PROCEDURE — 700105 HCHG RX REV CODE 258: Performed by: ANESTHESIOLOGY

## 2020-12-01 PROCEDURE — 700101 HCHG RX REV CODE 250: Performed by: ANESTHESIOLOGY

## 2020-12-01 PROCEDURE — A9270 NON-COVERED ITEM OR SERVICE: HCPCS | Performed by: ORTHOPAEDIC SURGERY

## 2020-12-01 PROCEDURE — A6454 SELF-ADHER BAND W>=3" <5"/YD: HCPCS | Performed by: ORTHOPAEDIC SURGERY

## 2020-12-01 PROCEDURE — 700102 HCHG RX REV CODE 250 W/ 637 OVERRIDE(OP)

## 2020-12-01 PROCEDURE — 700102 HCHG RX REV CODE 250 W/ 637 OVERRIDE(OP): Performed by: ORTHOPAEDIC SURGERY

## 2020-12-01 PROCEDURE — 88311 DECALCIFY TISSUE: CPT

## 2020-12-01 PROCEDURE — 500881 HCHG PACK, EXTREMITY: Performed by: ORTHOPAEDIC SURGERY

## 2020-12-01 PROCEDURE — 87205 SMEAR GRAM STAIN: CPT

## 2020-12-01 PROCEDURE — 700105 HCHG RX REV CODE 258: Performed by: ORTHOPAEDIC SURGERY

## 2020-12-01 PROCEDURE — 160035 HCHG PACU - 1ST 60 MINS PHASE I: Performed by: ORTHOPAEDIC SURGERY

## 2020-12-01 PROCEDURE — 160048 HCHG OR STATISTICAL LEVEL 1-5: Performed by: ORTHOPAEDIC SURGERY

## 2020-12-01 PROCEDURE — A6222 GAUZE <=16 IN NO W/SAL W/O B: HCPCS | Performed by: ORTHOPAEDIC SURGERY

## 2020-12-01 PROCEDURE — 160046 HCHG PACU - 1ST 60 MINS PHASE II: Performed by: ORTHOPAEDIC SURGERY

## 2020-12-01 PROCEDURE — A9270 NON-COVERED ITEM OR SERVICE: HCPCS

## 2020-12-01 PROCEDURE — 82962 GLUCOSE BLOOD TEST: CPT

## 2020-12-01 PROCEDURE — 160041 HCHG SURGERY MINUTES - EA ADDL 1 MIN LEVEL 4: Performed by: ORTHOPAEDIC SURGERY

## 2020-12-01 RX ORDER — ROCURONIUM BROMIDE 10 MG/ML
INJECTION, SOLUTION INTRAVENOUS PRN
Status: DISCONTINUED | OUTPATIENT
Start: 2020-12-01 | End: 2020-12-01 | Stop reason: SURG

## 2020-12-01 RX ORDER — DEXTROSE MONOHYDRATE 25 G/50ML
50 INJECTION, SOLUTION INTRAVENOUS
Status: DISCONTINUED | OUTPATIENT
Start: 2020-12-01 | End: 2020-12-01 | Stop reason: HOSPADM

## 2020-12-01 RX ORDER — HYDROMORPHONE HYDROCHLORIDE 1 MG/ML
0.2 INJECTION, SOLUTION INTRAMUSCULAR; INTRAVENOUS; SUBCUTANEOUS
Status: DISCONTINUED | OUTPATIENT
Start: 2020-12-01 | End: 2020-12-01 | Stop reason: HOSPADM

## 2020-12-01 RX ORDER — CELECOXIB 200 MG/1
200 CAPSULE ORAL ONCE
Status: COMPLETED | OUTPATIENT
Start: 2020-12-01 | End: 2020-12-01

## 2020-12-01 RX ORDER — CEFAZOLIN SODIUM 1 G/3ML
INJECTION, POWDER, FOR SOLUTION INTRAMUSCULAR; INTRAVENOUS PRN
Status: DISCONTINUED | OUTPATIENT
Start: 2020-12-01 | End: 2020-12-01 | Stop reason: SURG

## 2020-12-01 RX ORDER — GLYCOPYRROLATE 0.2 MG/ML
INJECTION INTRAMUSCULAR; INTRAVENOUS PRN
Status: DISCONTINUED | OUTPATIENT
Start: 2020-12-01 | End: 2020-12-01 | Stop reason: SURG

## 2020-12-01 RX ORDER — SODIUM CHLORIDE, SODIUM GLUCONATE, SODIUM ACETATE, POTASSIUM CHLORIDE AND MAGNESIUM CHLORIDE 526; 502; 368; 37; 30 MG/100ML; MG/100ML; MG/100ML; MG/100ML; MG/100ML
INJECTION, SOLUTION INTRAVENOUS
Status: DISCONTINUED | OUTPATIENT
Start: 2020-12-01 | End: 2020-12-01 | Stop reason: SURG

## 2020-12-01 RX ORDER — HYDROMORPHONE HYDROCHLORIDE 1 MG/ML
0.1 INJECTION, SOLUTION INTRAMUSCULAR; INTRAVENOUS; SUBCUTANEOUS
Status: DISCONTINUED | OUTPATIENT
Start: 2020-12-01 | End: 2020-12-01 | Stop reason: HOSPADM

## 2020-12-01 RX ORDER — SODIUM CHLORIDE, SODIUM GLUCONATE, SODIUM ACETATE, POTASSIUM CHLORIDE AND MAGNESIUM CHLORIDE 526; 502; 368; 37; 30 MG/100ML; MG/100ML; MG/100ML; MG/100ML; MG/100ML
500 INJECTION, SOLUTION INTRAVENOUS CONTINUOUS
Status: DISCONTINUED | OUTPATIENT
Start: 2020-12-01 | End: 2020-12-01 | Stop reason: HOSPADM

## 2020-12-01 RX ORDER — HYDRALAZINE HYDROCHLORIDE 20 MG/ML
5 INJECTION INTRAMUSCULAR; INTRAVENOUS
Status: DISCONTINUED | OUTPATIENT
Start: 2020-12-01 | End: 2020-12-01 | Stop reason: HOSPADM

## 2020-12-01 RX ORDER — DEXAMETHASONE SODIUM PHOSPHATE 4 MG/ML
INJECTION, SOLUTION INTRA-ARTICULAR; INTRALESIONAL; INTRAMUSCULAR; INTRAVENOUS; SOFT TISSUE PRN
Status: DISCONTINUED | OUTPATIENT
Start: 2020-12-01 | End: 2020-12-01 | Stop reason: SURG

## 2020-12-01 RX ORDER — LIDOCAINE HYDROCHLORIDE 40 MG/ML
SOLUTION TOPICAL PRN
Status: DISCONTINUED | OUTPATIENT
Start: 2020-12-01 | End: 2020-12-01 | Stop reason: SURG

## 2020-12-01 RX ORDER — GABAPENTIN 300 MG/1
300 CAPSULE ORAL ONCE
Status: COMPLETED | OUTPATIENT
Start: 2020-12-01 | End: 2020-12-01

## 2020-12-01 RX ORDER — DIPHENHYDRAMINE HYDROCHLORIDE 50 MG/ML
12.5 INJECTION INTRAMUSCULAR; INTRAVENOUS
Status: DISCONTINUED | OUTPATIENT
Start: 2020-12-01 | End: 2020-12-01 | Stop reason: HOSPADM

## 2020-12-01 RX ORDER — NEOSTIGMINE METHYLSULFATE 1 MG/ML
INJECTION, SOLUTION INTRAVENOUS PRN
Status: DISCONTINUED | OUTPATIENT
Start: 2020-12-01 | End: 2020-12-01 | Stop reason: SURG

## 2020-12-01 RX ORDER — VANCOMYCIN HYDROCHLORIDE 500 MG/10ML
INJECTION, POWDER, LYOPHILIZED, FOR SOLUTION INTRAVENOUS
Status: COMPLETED | OUTPATIENT
Start: 2020-12-01 | End: 2020-12-01

## 2020-12-01 RX ORDER — SODIUM CHLORIDE, SODIUM LACTATE, POTASSIUM CHLORIDE, CALCIUM CHLORIDE 600; 310; 30; 20 MG/100ML; MG/100ML; MG/100ML; MG/100ML
INJECTION, SOLUTION INTRAVENOUS CONTINUOUS
Status: DISCONTINUED | OUTPATIENT
Start: 2020-12-01 | End: 2020-12-01 | Stop reason: HOSPADM

## 2020-12-01 RX ORDER — BUPIVACAINE HYDROCHLORIDE 5 MG/ML
INJECTION, SOLUTION EPIDURAL; INTRACAUDAL
Status: DISCONTINUED | OUTPATIENT
Start: 2020-12-01 | End: 2020-12-01 | Stop reason: HOSPADM

## 2020-12-01 RX ORDER — ONDANSETRON 2 MG/ML
4 INJECTION INTRAMUSCULAR; INTRAVENOUS
Status: DISCONTINUED | OUTPATIENT
Start: 2020-12-01 | End: 2020-12-01 | Stop reason: HOSPADM

## 2020-12-01 RX ORDER — ONDANSETRON 2 MG/ML
INJECTION INTRAMUSCULAR; INTRAVENOUS PRN
Status: DISCONTINUED | OUTPATIENT
Start: 2020-12-01 | End: 2020-12-01 | Stop reason: SURG

## 2020-12-01 RX ORDER — ACETAMINOPHEN 500 MG
1000 TABLET ORAL ONCE
Status: COMPLETED | OUTPATIENT
Start: 2020-12-01 | End: 2020-12-01

## 2020-12-01 RX ORDER — MIDAZOLAM HYDROCHLORIDE 1 MG/ML
INJECTION INTRAMUSCULAR; INTRAVENOUS PRN
Status: DISCONTINUED | OUTPATIENT
Start: 2020-12-01 | End: 2020-12-01 | Stop reason: SURG

## 2020-12-01 RX ORDER — METOPROLOL TARTRATE 1 MG/ML
1 INJECTION, SOLUTION INTRAVENOUS
Status: DISCONTINUED | OUTPATIENT
Start: 2020-12-01 | End: 2020-12-01 | Stop reason: HOSPADM

## 2020-12-01 RX ORDER — HYDROMORPHONE HYDROCHLORIDE 1 MG/ML
0.4 INJECTION, SOLUTION INTRAMUSCULAR; INTRAVENOUS; SUBCUTANEOUS
Status: DISCONTINUED | OUTPATIENT
Start: 2020-12-01 | End: 2020-12-01 | Stop reason: HOSPADM

## 2020-12-01 RX ADMIN — FENTANYL CITRATE 50 MCG: 50 INJECTION, SOLUTION INTRAMUSCULAR; INTRAVENOUS at 10:17

## 2020-12-01 RX ADMIN — PROPOFOL 200 MG: 10 INJECTION, EMULSION INTRAVENOUS at 10:09

## 2020-12-01 RX ADMIN — FENTANYL CITRATE 50 MCG: 50 INJECTION, SOLUTION INTRAMUSCULAR; INTRAVENOUS at 10:06

## 2020-12-01 RX ADMIN — MIDAZOLAM HYDROCHLORIDE 2 MG: 1 INJECTION, SOLUTION INTRAMUSCULAR; INTRAVENOUS at 10:03

## 2020-12-01 RX ADMIN — PROPOFOL 25 MCG/KG/MIN: 10 INJECTION, EMULSION INTRAVENOUS at 10:25

## 2020-12-01 RX ADMIN — CEFAZOLIN 2 G: 330 INJECTION, POWDER, FOR SOLUTION INTRAMUSCULAR; INTRAVENOUS at 10:04

## 2020-12-01 RX ADMIN — ROCURONIUM BROMIDE 50 MG: 10 INJECTION, SOLUTION INTRAVENOUS at 10:10

## 2020-12-01 RX ADMIN — NEOSTIGMINE METHYLSULFATE 3 MG: 1 INJECTION INTRAVENOUS at 10:46

## 2020-12-01 RX ADMIN — SODIUM CHLORIDE, SODIUM GLUCONATE, SODIUM ACETATE, POTASSIUM CHLORIDE AND MAGNESIUM CHLORIDE 500 ML: 526; 502; 368; 37; 30 INJECTION, SOLUTION INTRAVENOUS at 11:11

## 2020-12-01 RX ADMIN — ACETAMINOPHEN 1000 MG: 500 TABLET ORAL at 08:45

## 2020-12-01 RX ADMIN — GABAPENTIN 300 MG: 300 CAPSULE ORAL at 08:45

## 2020-12-01 RX ADMIN — FENTANYL CITRATE 50 MCG: 50 INJECTION, SOLUTION INTRAMUSCULAR; INTRAVENOUS at 10:41

## 2020-12-01 RX ADMIN — SODIUM CHLORIDE, POTASSIUM CHLORIDE, SODIUM LACTATE AND CALCIUM CHLORIDE: 600; 310; 30; 20 INJECTION, SOLUTION INTRAVENOUS at 10:03

## 2020-12-01 RX ADMIN — LIDOCAINE HYDROCHLORIDE 4 ML: 40 SOLUTION TOPICAL at 10:17

## 2020-12-01 RX ADMIN — HYDROCODONE BITARTRATE AND ACETAMINOPHEN 15 MG: 7.5; 325 SOLUTION ORAL at 11:09

## 2020-12-01 RX ADMIN — ONDANSETRON 4 MG: 2 INJECTION INTRAMUSCULAR; INTRAVENOUS at 10:46

## 2020-12-01 RX ADMIN — CELECOXIB 200 MG: 200 CAPSULE ORAL at 08:45

## 2020-12-01 RX ADMIN — SODIUM CHLORIDE, SODIUM GLUCONATE, SODIUM ACETATE, POTASSIUM CHLORIDE AND MAGNESIUM CHLORIDE: 526; 502; 368; 37; 30 INJECTION, SOLUTION INTRAVENOUS at 10:06

## 2020-12-01 RX ADMIN — POVIDONE IODINE 15 ML: 100 SOLUTION TOPICAL at 08:46

## 2020-12-01 RX ADMIN — GLYCOPYRROLATE 0.2 MG: 0.2 INJECTION INTRAMUSCULAR; INTRAVENOUS at 10:46

## 2020-12-01 RX ADMIN — DEXAMETHASONE SODIUM PHOSPHATE 4 MG: 4 INJECTION, SOLUTION INTRA-ARTICULAR; INTRALESIONAL; INTRAMUSCULAR; INTRAVENOUS; SOFT TISSUE at 10:18

## 2020-12-01 ASSESSMENT — PAIN SCALES - GENERAL: PAIN_LEVEL: 0

## 2020-12-01 ASSESSMENT — PAIN DESCRIPTION - PAIN TYPE
TYPE: SURGICAL PAIN

## 2020-12-01 NOTE — OR NURSING
Pt arrived to phase II. Pt states pain is tolerable and is motivated to go home. D/C instructions given to pt, verbalized understanding. PIV removed prior to D/C.     Pt refused crutches at D/C, states she will be using a walker at home.

## 2020-12-01 NOTE — ANESTHESIA QCDR
2019 [facility] Clinical Data Registry (for Quality Improvement)     Postoperative nausea/vomiting risk protocol (Adult = 18 yrs and Pediatric 3-17 yrs)- (430 and 463)  General inhalation anesthetic (NOT TIVA) with PONV risk factors: Yes  Provision of anti-emetic therapy with at least 2 different classes of agents: Yes   Patient DID NOT receive anti-emetic therapy and reason is documented in Medical Record:  N/A    Multimodal Pain Management- (477)  Non-emergent surgery AND patient age >= 18: Yes  Use of Multimodal Pain Management, two or more drugs and/or interventions, NOT including systemic opioids: Yes  Exception: Documented allergy to multiple classes of analgesics: N/A    Smoking Abstinence (404)  Patient is current smoker (cigarette, pipe, e-cig, marijuanna): Yes  Elective Surgery: Yes  Abstinence instructions provided prior to day of surgery: Yes  Patient abstained from smoking on day of surgery: No    Pre-Op Beta-Blocker in Isolated CABG (44)  Isolated CABG AND patient age >= 18: No  Beta-blocker admin within 24 hours of surgical incision:   Exception:of medical reason(s) for not administering beta blocker within 24 hours prior to surgical incision (e.g., not  indicated,other medical reason):     PACU assessment of acute postoperative pain prior to Anesthesia Care End- Applies to Patients Age = 18- (ABG7)  Initial PACU pain score is which of the following: < 7/10  Patient unable to report pain score: N/A    Post-anesthetic transfer of care checklist/protocol to PACU/ICU- (426 and 427)  Upon conclusion of case, patient transferred to which of the following locations: PACU/Non-ICU  Use of transfer checklist/protocol: Yes  Exclusion: Service Performed in Patient Hospital Room (and thus did not require transfer): N/A  Unplanned admission to ICU related to anesthesia service up through end of PACU care- (MD51)  Unplanned admission to ICU (not initially anticipated at anesthesia start time): No

## 2020-12-01 NOTE — ANESTHESIA TIME REPORT
Anesthesia Start and Stop Event Times     Date Time Event    12/1/2020 1003 Anesthesia Start     1059 Anesthesia Stop        Responsible Staff  12/01/20    Name Role Begin End    Tevin Gu M.D. 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

## 2020-12-01 NOTE — ANESTHESIA PROCEDURE NOTES
Airway    Date/Time: 12/1/2020 10:16 AM  Performed by: Tevin Gu M.D.  Authorized by: Tevin Gu M.D.     Location:  OR  Urgency:  Elective  Indications for Airway Management:  Anesthesia      Spontaneous Ventilation: absent    Sedation Level:  Deep  Preoxygenated: Yes    Patient Position:  Ramp  Mask Difficulty Assessment:  1 - vent by mask  Final Airway Type:  Endotracheal airway  Final Endotracheal Airway:  ETT  Cuffed: Yes    Technique Used for Successful ETT Placement:  Direct laryngoscopy    Insertion Site:  Oral  Blade Type:  Glide  Laryngoscope Blade/Videolaryngoscope Blade Size:  3  ETT Size (mm):  7.0  Measured from:  Lips  ETT to Lips (cm):  22  Placement Verified by: auscultation and capnometry    Cormack-Lehane Classification:  Grade I - full view of glottis  Number of Attempts at Approach:  1   G3V with DL mac 4, attempt x1. Switched to [place] blade 3, cords surrounded by significant soft tissue, atraumatic x1 attempt

## 2020-12-01 NOTE — ANESTHESIA POSTPROCEDURE EVALUATION
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 Responsible Provider: Tevin Gu M.D.    Anesthesia Type: general ASA Status: 3          Final Anesthesia Type: general  Last vitals  BP   Blood Pressure: 122/69    Temp   36.2 °C (97.1 °F)    Pulse   Pulse: 97   Resp   17    SpO2   93 %      Anesthesia Post Evaluation    Patient location during evaluation: PACU  Patient participation: complete - patient participated  Level of consciousness: awake and alert  Pain score: 0    Airway patency: patent  Anesthetic complications: no  Cardiovascular status: hemodynamically stable  Respiratory status: acceptable and face mask  Hydration status: euvolemic    PONV: none

## 2020-12-01 NOTE — OR NURSING
"AxOx4. VSS on room air. Drsg c/d/i. , patient declined insulin, stated it's rarely under 200, did not want a \"low\". Denied pain after pain meds given in PACU. Tolerated juice and water. Report given to Jacinto WESTBROOK  "

## 2020-12-01 NOTE — OR SURGEON
Immediate Post OP Note    PreOp Diagnosis: Left second MTH ulcer, hammertoes, cavus foot    PostOp Diagnosis: Same    Procedure(s):  LENGTHENING, TENDON-LENGTHENING - Wound Class: Clean  TENOTOMY-PERCUTANEOUS FLEXOR OF THE 2ND THROUGH 5TH TOE - Wound Class: Clean  IRRIGATION AND DEBRIDEMENT, WOUND-FOOT AND PLANTAR ULCERATION - Wound Class: Contaminated  EXCISION, METATARSAL BONE, HEAD - Wound Class: Clean    Surgeon(s):  ARIS Jaimes M.D.    Anesthesiologist/Type of Anesthesia:  Anesthesiologist: Tevin Gu M.D./General    Surgical Staff:  Circulator: Sherry Villagran R.N.  Scrub Person: Nicolasa Mackey    Specimens removed if any:  ID Type Source Tests Collected by Time Destination   1 : Left foot metatarsal heads Bone Foot AEROBIC/ANAEROBIC CULTURE (SURGERY) Seymour Mcmillan M.D. 12/1/2020 10:42 AM    A : Left 2nd metatarsal head clean cut Bone Foot PATHOLOGY SPECIMEN Seymour Mcmillan M.D. 12/1/2020 10:42 AM        Estimated Blood Loss: 10cc    TT: 20 min @ 250mmHg    Findings: Ulcer second MTH    Complications: None        12/1/2020 11:16 AM Seymour Mcmillan M.D.

## 2020-12-01 NOTE — OP REPORT
DATE OF SERVICE:  12/01/2020    PREOPERATIVE DIAGNOSES:  1.  Nonhealing ulceration, left second metatarsal head.  2.  Cavus foot.  3.  Hammertoes.    POSTOPERATIVE DIAGNOSES:  1.  Nonhealing ulceration, left second metatarsal head.  2.  Cavus foot.  3.  Hammertoes.    PROCEDURES:  1.  Left tendo-Achilles lengthening.  2.  Left foot irrigation and debridement, multiple compartments.  3.  Left second, third, fourth and fifth percutaneous flexor tenotomy.  4.  Left second, third, fourth and fifth metatarsal head excisions.    SURGEON:  Seymour Mcmillan MD    ASSISTANT:  Laci Shaw MD and Charlene Guerrero.    ANESTHESIA:  General with 30 mL local 0.5% Marcaine without epinephrine.    ESTIMATED BLOOD LOSS:  10 mL.    TOURNIQUET TIME:  20 minutes at 250 mmHg.    SPECIMENS:  Metatarsal heads for culture, clean cut second metatarsal for   pathology.    COMPLICATIONS:  None.    OUTCOME:  PACU in stable condition.    HISTORY OF PRESENT ILLNESS:  This is a 50-year-old female who is status post a   first ray amputation.  She has gone to heal this, but ulcerate her second   metatarsal head.  I have been following her wound nonoperatively, was not   progressing and we elected to proceed with taking pressure off it with the   above above-stated procedure.  She was greeted in the preoperative holding   area and identified by name and medical record number.  Left lower extremity   was marked.  Risks of procedure including bleeding, infection, pain,   continuation of symptoms, neurovascular damage, need for more surgery were   discussed and she provided written consent.    DESCRIPTION OF PROCEDURE:  She was taken to the operating room, placed on   table in supine position.  Preoperative antibiotics were administered.    General anesthesia was induced.  Nonsterile tourniquet was placed on her left   thigh.  Left lower extremity prepped and draped in the usual sterile fashion.    An operative pause was taken, where all present were in  agreement with   patient identification, laterality, and procedure to be performed.  The limb   was elevated for 5 minutes, tourniquet raised to 250 mmHg.    Tendo-Achilles lengthening.  We made 3 half cuts in the Achilles tendon, 2   medial and 1 lateral,  by 15 mm.  We obtained 30 degrees of   additional dorsiflexion with her knee in flexion and extension.  Each of these   was irrigated and closed with a simple 3-0 nylon.    Flexor tenotomies 2 through 5.  We inserted the 15 blade at the proximal   flexor crease, transected the long flexor tendon of the second toe, which   provided nice relaxation.  We pushed for some additional relaxation through   the proximal interphalangeal joint.  I did not feel placing any hardware was   appropriate for her.  We then repeated this procedure with 3, 4, and 5.  Each   incision irrigated and closed with a simple 3-0 nylon in horizontal mattress   fashion.    Second, third, fourth and fifth metatarsal head excisions.  Despite relaxation   of the Achilles in the hammertoes, her metatarsal has remained quite   prominent.  We made a 2 cm longitudinal incision in the second webspace.    Blunt dissection carried down to the second metatarsal neck.  Oscillating saw   was used to remove the head to cut the neck with a plantar bevel.  This was   then repeated for the third metatarsal head  through this incision.  Then, the   fourth and fifth through a separate fourth webspace incision, each cut was   bevel nicely.  We ensured no significantly prominent bone.  The cascade was   excellent.  We sent the metatarsal heads for culture.  We made an additional   clean cut of the second metatarsal neck, sent for pathology for clean cut.    Irrigation and debridement, multiple compartments of the foot.  We used   forceps and scalpel to excise hypertrophic tissue from the plantar ulceration.    We used forceps and scalpel to excise callus from around the ulceration.  We   irrigated from  both the plantar ulcer and dorsally within the second webspace   incision copiously.  The tourniquet was let down.  Hemostasis was achieved.    Adequate blood flow to the zone of injury was observed for healing.    Vancomycin powder was placed within the wound bed.  Incisions were closed with   3-0 nylon in horizontal mattress fashion.  Adaptic gauze and a compressive   wrap were placed.  The patient was placed back in her boot.  She was awakened   and extubated in stable condition.    POSTOPERATIVE COURSE:  She will be discharged home today assuming adequate   pain control and mobilization, weightbearing as tolerated in a Cam walker   boot.  I would like her to wear the boot at all times.  We will see her back   in 10-14 days for wound check and suture removal.       ____________________________________     MD COLLINS CASTILLO / CARMEN    DD:  12/01/2020 11:02:23  DT:  12/01/2020 11:23:17    D#:  [id number]  Job#:  [id number]

## 2020-12-01 NOTE — DISCHARGE INSTRUCTIONS
ACTIVITY: Rest and take it easy for the first 24 hours.  A responsible adult is recommended to remain with you during that time.  It is normal to feel sleepy.  We encourage you to not do anything that requires balance, judgment or coordination.    MILD FLU-LIKE SYMPTOMS ARE NORMAL. YOU MAY EXPERIENCE GENERALIZED MUSCLE ACHES, THROAT IRRITATION, HEADACHE AND/OR SOME NAUSEA.    FOR 24 HOURS DO NOT:  Drive, operate machinery or run household appliances.  Drink beer or alcoholic beverages.   Make important decisions or sign legal documents.    SPECIAL INSTRUCTIONS:     Weight Bearing as tolerated in boot  boot at all times  Crutches for mobilization  Ice & elevate  Deep Vein Thrombosis prevention: Aspirin 81 mg twice a day starting Post-Op-Day # 1  Follow-up in office in 2 weeks at previously scheduled appointment      DIET: To avoid nausea, slowly advance diet as tolerated, avoiding spicy or greasy foods for the first day.  Add more substantial food to your diet according to your physician's instructions.  Babies can be fed formula or breast milk as soon as they are hungry.  INCREASE FLUIDS AND FIBER TO AVOID CONSTIPATION.    SURGICAL DRESSING/BATHING: Keep dressing clean, dry and intact until follow-up appointment, no submerging in water    FOLLOW-UP APPOINTMENT:  A follow-up appointment should be arranged with your doctor in 2 weeks; call to schedule.    You should CALL YOUR PHYSICIAN if you develop:  Fever greater than 101 degrees F.  Pain not relieved by medication, or persistent nausea or vomiting.  Excessive bleeding (blood soaking through dressing) or unexpected drainage from the wound.  Extreme redness or swelling around the incision site, drainage of pus or foul smelling drainage.  Inability to urinate or empty your bladder within 8 hours.  Problems with breathing or chest pain.    You should call 911 if you develop problems with breathing or chest pain.  If you are unable to contact your doctor or surgical  center, you should go to the nearest emergency room or urgent care center.  Physician's telephone #: [phone]    If any questions arise, call your doctor.  If your doctor is not available, please feel free to call the Surgical Center at [phone]. The Contact Center is open Monday through Friday 7AM to 5PM and may speak to a nurse at [phone], or toll free at [phone].     A registered nurse may call you a few days after your surgery to see how you are doing after your procedure.    MEDICATIONS: Resume taking daily medication.  Take prescribed pain medication with food.  If no medication is prescribed, you may take non-aspirin pain medication if needed.  PAIN MEDICATION CAN BE VERY CONSTIPATING.  Take a stool softener or laxative such as senokot, pericolace, or milk of magnesia if needed.    Prescription given for [facility] folder.  Last pain medication given at 11:10am.    If your physician has prescribed pain medication that includes Acetaminophen (Tylenol), do not take additional Acetaminophen (Tylenol) while taking the prescribed medication.    Depression / Suicide Risk    As you are discharged from this [facility] facility, it is important to learn how to keep safe from harming yourself.    Recognize the warning signs:  · Abrupt changes in personality, positive or negative- including increase in energy   · Giving away possessions  · Change in eating patterns- significant weight changes-  positive or negative  · Change in sleeping patterns- unable to sleep or sleeping all the time   · Unwillingness or inability to communicate  · Depression  · Unusual sadness, discouragement and loneliness  · Talk of wanting to die  · Neglect of personal appearance   · Rebelliousness- reckless behavior  · Withdrawal from people/activities they love  · Confusion- inability to concentrate     If you or a loved one observes any of these behaviors or has concerns about self-harm, here's what you can do:  · Talk about  it- your feelings and reasons for harming yourself  · Remove any means that you might use to hurt yourself (examples: pills, rope, extension cords, firearm)  · Get professional help from the community (Mental Health, Substance Abuse, psychological counseling)  · Do not be alone:Call your Safe Contact- someone whom you trust who will be there for you.  · Call your local CRISIS HOTLINE 693-2706 or 324-399-5356  · Call your local Children's Mobile Crisis Response Team [place] (492) 906-4754 or www.FreakOut  · Call the toll free National Suicide Prevention Hotlines   · National Suicide Prevention Lifeline 686-744-VSFI (3529)  · National Hope Line Network 800-SUICIDE (823-1491)

## 2020-12-02 LAB
GRAM STN SPEC: NORMAL
SIGNIFICANT IND 70042: NORMAL
SITE SITE: NORMAL
SOURCE SOURCE: NORMAL

## 2020-12-04 LAB
BACTERIA TISS AEROBE CULT: NORMAL
GRAM STN SPEC: NORMAL
SIGNIFICANT IND 70042: NORMAL
SITE SITE: NORMAL
SOURCE SOURCE: NORMAL

## 2020-12-06 LAB
BACTERIA SPEC ANAEROBE CULT: NORMAL
SIGNIFICANT IND 70042: NORMAL
SITE SITE: NORMAL
SOURCE SOURCE: NORMAL

## 2020-12-07 NOTE — H&P
Surgery Orthopedic History & Physical Note    Date  12/7/2020    Primary Care Physician  Mark Vasquez D.O.    CC  * No Diagnosis Codes entered *    HPI  This is a 50 y.o. female who presented with the same complaint as submitted H&P without change    Past Medical History:   Diagnosis Date   • Bipolar 1 disorder (HCC)     depression   • Breath shortness     pt feels from weight and smoker   • Dental disorder     2  teeth pulled 11/27/20   • Diabetes ([facility])     oral/insulin   • Heart burn    • High cholesterol    • History of diabetic neuropathy    • Hypertension    • Open wound     bilateral feet   • Psoriasis    • Renal disorder     with sepsis   • Sepsis (HCC) 2017       Past Surgical History:   Procedure Laterality Date   • TENDON LENGHTENING Left 12/1/2020    Procedure: LENGTHENING, TENDON-LENGTHENING;  Surgeon: Seymour Mcmillan M.D.;  Location: [facility];  Service: Orthopedics   • TENOTOMY Left 12/1/2020    Procedure: TENOTOMY-PERCUTANEOUS FLEXOR OF THE 2ND THROUGH 5TH TOE;  Surgeon: Seymour Mcmillan M.D.;  Location: [facility];  Service: Orthopedics   • IRRIGATION & DEBRIDEMENT ORTHO Left 12/1/2020    Procedure: IRRIGATION AND DEBRIDEMENT, WOUND-FOOT AND PLANTAR ULCERATION;  Surgeon: Seymour Mcmillan M.D.;  Location: [facility];  Service: Orthopedics   • METATARSAL HEAD RESECTION Left 12/1/2020    Procedure: EXCISION, METATARSAL BONE, HEAD;  Surgeon: Seymour Mcmillan M.D.;  Location: [facility];  Service: Orthopedics   • INCISION AND DRAINAGE GENERAL Left 6/27/2018    Procedure: INCISION AND DRAINAGE GENERAL/FOOT/POSS WOUND VAC;  Surgeon: Jsoe Manuel Merlos M.D.;  Location: [facility];  Service: Orthopedics   • OTHER ORTHOPEDIC SURGERY  2017    left great toe amputation   • OTHER  2013/2019    cellulitis    • OTHER ORTHOPEDIC SURGERY      left foot tendon repait       No current facility-administered medications for this encounter.      Current  Outpatient Medications   Medication Sig Dispense Refill   • buPROPion SR (WELLBUTRIN SR) 100 MG TABLET SR 12 HR Take 100 mg by mouth every evening.     • ATORVASTATIN CALCIUM PO Take  by mouth every evening.     • Multiple Vitamin (MULTI-VITAMIN DAILY PO) Take  by mouth every day.     • HYDROXYZINE HCL PO Take  by mouth 4 times a day as needed.     • amoxicillin (AMOXIL) 500 MG Cap Take 500 mg by mouth 2 times a day.     • Melatonin 10 MG Tab Take 10-30 mg by mouth as needed.     • ALBUTEROL INH Inhale as needed.     • Alogliptin Benzoate 25 MG Tab Take  by mouth every evening.     • Insulin Detemir (LEVEMIR SC) Inject 40-50 Units under the skin 2 Times a Day.     • gabapentin (NEURONTIN) 300 MG Cap Take 300 mg by mouth 3 times a day.     • ibuprofen (MOTRIN) 600 MG Tab Take 600 mg by mouth every 6 hours as needed.     • omeprazole (PRILOSEC) 40 MG delayed-release capsule Take 40 mg by mouth every evening.     • losartan (COZAAR) 50 MG Tab Take 50 mg by mouth every evening.     • TRIAMCINOLONE ACETONIDE, TOP, 0.05 % Ointment by Apply externally route.     • linagliptin (TRADJENTA) 5 MG Tab tablet Take 1 Tab by mouth every day. (Patient not taking: Reported on 11/27/2020) 30 Tab 1       Social History     Socioeconomic History   • Marital status: Single     Spouse name: Not on file   • Number of children: Not on file   • Years of education: Not on file   • Highest education level: Not on file   Occupational History   • Not on file   Social Needs   • Financial resource strain: Not on file   • Food insecurity     Worry: Not on file     Inability: Not on file   • Transportation needs     Medical: Not on file     Non-medical: Not on file   Tobacco Use   • Smoking status: Current Every Day Smoker     Packs/day: 0.50     Years: 20.00     Pack years: 10.00     Types: Cigarettes   • Smokeless tobacco: Never Used   Substance and Sexual Activity   • Alcohol use: No     Comment: 0-10 per month   • Drug use: Yes     Types:  Inhaled     Comment: marijuana daily   • Sexual activity: Not on file   Lifestyle   • Physical activity     Days per week: Not on file     Minutes per session: Not on file   • Stress: Not on file   Relationships   • Social connections     Talks on phone: Not on file     Gets together: Not on file     Attends Mandaeism service: Not on file     Active member of club or organization: Not on file     Attends meetings of clubs or organizations: Not on file     Relationship status: Not on file   • Intimate partner violence     Fear of current or ex partner: Not on file     Emotionally abused: Not on file     Physically abused: Not on file     Forced sexual activity: Not on file   Other Topics Concern   • Not on file   Social History Narrative   • Not on file       History reviewed. No pertinent family history.    Allergies  Amlodipine, Clindamycin, and Lisinopril    Review of Systems  Negative    Physical Exam    Vital Signs  Blood Pressure: 125/58   Temperature: 37.2 °C (99 °F)   Pulse: 85   Respiration: 16   Pulse Oximetry: 95 %       Labs:                    Radiology:  No orders to display         Assessment/Plan:  * No Diagnosis Codes entered *  Procedure(s):  LENGTHENING, TENDON-LENGTHENING  TENOTOMY-PERCUTANEOUS FLEXOR OF THE 2ND THROUGH 5TH TOE  IRRIGATION AND DEBRIDEMENT, WOUND-FOOT AND PLANTAR ULCERATION  EXCISION, METATARSAL BONE, HEAD

## 2021-05-28 NOTE — PROGRESS NOTES
"IV resited to right posterior forearm, 22g x1 attempt. Pt tolerated well. IV ABX infusing. FSBS 211, insulin per MAR. MD to adjust insulin per pt request--\"I've been managing my own blood sugar since 2014 and I did a way better job.\" Pt manic/anxious, cooperative at this time. Calms with verbal instruction. Will CTM.  " Patient is requesting a z-pack just in case she gets a sinus infection over the weekend. Please call to advise if that can be prescribed.

## 2021-10-07 NOTE — ED NOTES
Infectious Diseases Daily Progress Note      Gurwinder Montenegro  Date of Service: 10/7/2021   Hospital Day: 7  Principal Diagnosis:                            This is  Gurwinder Montenegro who is a 28 year old  male admitted 10/1/2021  5:57 PM     Fever outside of hospital, on treatment for infected decubitus with MDR Acinetobacter.  Cannot exclude line infection but cultures NGSF  Difficulty with IV access, pulled out PICC and could not be sufficiently sedated for a new one unbtiltomorrow       Current antimicrobials:                             Flagyl  Vancomycin  Cefepime     Scheduled Medications piperacillin-tazobactam (ZOSYN) extended interval IVPB, 3.375 g, 3 times per day  sodium chloride (PF), 10 mL, 3 times per day  cloNIDine, 0.1 mg, 3 times per day  lisinopril-hydroCHLOROthiazide 10-12.5 mg, , Daily  phenyTOIN, 100 mg, Daily  phenyTOIN, 300 mg, Nightly  risperiDONE, 0.5 mg, Nightly  atorvastatin, 20 mg, Nightly  heparin (porcine), 7,500 Units, 3 times per day  Potassium Standard Replacement Protocol, , See Admin Instructions  Magnesium Standard Replacement Protocol, , See Admin Instructions  sodium hypochlorite, 1 application, Daily  sodium chloride (PF), 2 mL, 2 times per day        PMHx, Social Hx , Medications and Allergies reviewed.    Reviewed Pertinent: Laboratory studies, radiographic studies, medications, and recent progress notes.    Subjective:  No new events. No new issues     ROS: No fever, chills, no nausea or abd pain, diarrhea  , No rashes     Objective:     Vital Signs:   Visit Vitals  /60 (BP Location: LUE - Left upper extremity, Patient Position: Semi-De La Paz's)   Pulse 90   Temp 97.9 °F (36.6 °C) (Oral)   Resp 18   Ht 6' 3\" (1.905 m)   Wt 106.9 kg (235 lb 10.8 oz)   SpO2 100%   BMI 29.46 kg/m²      Temp  Min: 97.9 °F (36.6 °C)  Max: 98.7 °F (37.1 °C)     Physical Exam:      General Appearance:    Alert, cooperative, no distress   Head:    Normocephalic, without obvious abnormality, atraumatic  Attempted to call family to assist with ride home. No answer at this time.      Eyes:    Pupils eqiual, round, reactive to light, conjunctivae/corneas    clear, extraocular movements intact, both eyes    Ears:    Normal external ear canals, both ears   Nose:   Nares normal, septum midline, mucosa normal, no drainage    Throat:   Lips, mucosa, and tongue normal; teeth and gums normal   Neck:   Supple, symmetrical, trachea midline, no adenopathy;           Back:     Symmetric, no curvature, range of motion normal, no     costovertebral angle tenderness   Lungs:     Clear to auscultation bilaterally, respirations unlabored   Chest wall:    No tenderness or deformity, PICC out   Heart:    Regular rate and rhythm, S1 and S2 normal, no murmur, rub   or gallop   Abdomen:     Soft, non-tender, bowel sounds active all four quadrants,     no masses, no organomegaly   Genitalia:      Rectal:      Extremities:   Extremities normal, atraumatic, no cyanosis or edema, did not visualize wound as he was having PIV placed               Pulses:   2+ and symmetric all extremities   Skin:   Skin color, texture, turgor normal, no rashes or lesions   Lymph nodes:   Cervical, supraclavicular nodes normal   Neurologic:   baseline.            Labs Reviewed    Reviewed     Recent Labs   Lab 10/01/21  1904   SODIUM 134*   POTASSIUM 3.1*   CHLORIDE 97*   CO2 31   ANIONGAP 9*   BUN 6   CREATININE 0.48*   GLUCOSE 122*   CALCIUM 8.9   ALBUMIN 2.4*   MG 2.0   AST 18   GPT 25   ALKPT 173*   BILIRUBIN 0.2    Recent Labs   Lab 10/07/21  0459 10/01/21  1904   WBC 7.7 10.8   HGB 10.2* 10.0*   HCT 32.2* 30.9*    353   )       Lab Results   Component Value Date    VANCT 13.0 09/12/2021     Recent Labs     07/29/21  1900 08/01/21  0557 08/31/21  1330 09/14/21  0608 09/15/21  0515   RESR 101*  --  107*  --  38*   CRP 30.0* 23.0* 17.0* 3.7*  --        URINALYSIS  Recent Labs   Lab 10/01/21  2055   USPG 1.020   UPH 8.0*   UKET Negative   UPROT 100 *   UGLU Negative   UBILI Negative   UROB 1.0   UWBC Negative   UNITR Negative    URBC Trace*          Microbiology:      BC 10/1: negative.  X 2  Swab 10/2: pseudomonas. I to cefepime   Staph aureus pending.      Results for NITO HO (MRN [id number]) as of 7/30/2021 08:07    Ref. Range 7/29/2021 19:00   ESR Latest Ref Range: 0 - 20 mm/hr 101 (H)   Results for NITO HO (MRN [id number]) as of 7/30/2021 08:07    Ref. Range 7/29/2021 19:00   C-REACTIVE PROTEIN Latest Ref Range: <=1.0 mg/dL 30.0 (H)   PROCALCITONIN Latest Ref Range: <=0.09 ng/mL 0.20 (H)      Results for NITO HO (MRN [id number]) as of 10/4/2021 07:36   Ref. Range 10/1/2021 19:10   SARS CoV 2 Qualitative RT PCR Latest Ref Range: Not Detected / Detected / Presumptive Positive / Inhibitors present  Not Detected      Results for NITO HO (MRN [id number]) as of 8/2/2021 08:09    Ref. Range 7/29/2021 18:55   S AUREUS/MRSA PCR Latest Ref Range: Not Detected  Not Detected      BC:   Staphylococcus epidermidisPanic     When only one blood culture is positive susceptibilities will not be performed.    Staphylococcus pettenkoferiPanic     When only one blood culture is positive susceptibilities will not be performed.              PENDING   URINE CULTURE 7/29 negative.                                     R buttock culture 7/30:   Many Streptococcus Group GAbnormal         Few Pseudomonas aeruginosaAbnormal        8/1 sacral tissue: multiple pathogens. BACTEROIDS       Results for NITO HO (MRN [id number]) as of 9/1/2021 07:03    Ref. Range 8/31/2021 13:33   Lactic Acid Venous Latest Ref Range: <2.0 mmol/L 0.8   Results for NITO HO (MRN [id number]) as of 9/15/2021 09:51    Ref. Range 8/31/2021 13:30 9/15/2021 05:15   ESR Latest Ref Range: 0 - 20 mm/hr 107 (H) 38 (H)      Results for NITO OH (MRN [id number]) as of 9/15/2021 09:51    Ref. Range 7/29/2021 19:00 8/1/2021 05:57 8/31/2021 13:30 9/14/2021 06:08   C-REACTIVE PROTEIN Latest Ref Range: <=1.0 mg/dL 30.0 (H) 23.0 (H) 17.0 (H) 3.7 (H)          swab wound 9/1: Aci bau and enterococcus  sensitive. .   Minocycline/tigecycline susceptible.      Radiology/Imaging:        CXR: 1.  Normal chest.        CT PELVIS: 1.  There is a large soft tissue wound in the right buttocks with edema of  the underlying musculature. There is no deeper subcutaneous gas or evidence  of an undrained fluid collection.  2.  Pelvic lymphadenopathy is likely reactive.  3.  Marked bladder wall thickening. Consider correlation with urinalysis.        CT pelvis 8/31: Increased size of large cutaneous and subcutaneous soft tissue wound at the  right buttock now measuring 16 x 4 cm in axial dimension. Interval  extension of gas and fluid into the gluteus minimus and medius as well as  anterior and cephalad to the right femur. Dissecting gas is noted.     Cortical erosion at the right femoral greater trochanter consistent with  osteomyelitis.     Right hip joint effusion. Septic arthritis not excluded.       CTA/P 10/1: IMPRESSION:    1. Fibrotic/atelectatic changes at the lung bases.  2. Probable tiny right renal cortical cyst.  3. IVC filter in place.  4. Unremarkable bowel gas pattern.  5. Suprapubic catheter in place within the bladder. There is marked bladder  wall thickening, similar to the prior study.        Assessment:   Fever outside of hospital, on treatment for infected decubitus with MDR Acinetobacter.  Cannot exclude line infection but cultures NGSF  Difficulty with IV access, pulled out PICC and could not be sufficiently sedated for a new one unbtiltomorrow     Plan:    DC zosyn   Ceftazidime/vancomycin to treat MRSA and pseudomonas.   Allergic to tetracyclines.     BC  Negative.   Wound care on board.               Arslan Landrum M.D.  Infectious Diseases  Pager: [phone]  10/7/2021  7:28 AM

## 2023-08-09 NOTE — ANESTHESIA PREPROCEDURE EVALUATION
Anes H&P:  PAST MEDICAL HISTORY:   50 y.o. female who presents for Procedure(s) (LRB):  LENGTHENING, TENDON-LENGTHENING (Left)  TENOTOMY-PERCUTANEOUS FLEXOR OF THE 2ND THROUGH 5TH TOE  IRRIGATION AND DEBRIDEMENT, WOUND-FOOT AND PLANTAR ULCERATION  EXCISION, METATARSAL BONE, HEAD-POSSIBLE.  She has current and past medical problems significant for:    #BMI 41  #DM2, preop   #HTN on amlodipine, losartan AND LISINOPRIL, LAST TAKEN yesterday  #Bilpolar 1  #Asthma, inhaler used once last month, current daily smoker, THC daily  #GERD- well controlled    Able to climb 2 flights of stair without dyspnea or angina, > 4 METs     Past Medical History:   Diagnosis Date   • Bipolar 1 disorder ([facility])     depression   • Breath shortness     pt feels from weight and smoker   • Dental disorder     2  teeth pulled 11/27/20   • Diabetes ([facility])     oral/insulin   • Heart burn    • High cholesterol    • History of diabetic neuropathy    • Hypertension    • Open wound     bilateral feet   • Psoriasis    • Renal disorder     with sepsis   • Sepsis ([facility]) 2017       SMOKING/ALCOHOL/RECREATIONAL DRUG USE:  Social History     Tobacco Use   • Smoking status: Current Every Day Smoker     Packs/day: 0.50     Years: 20.00     Pack years: 10.00     Types: Cigarettes   • Smokeless tobacco: Never Used   Substance Use Topics   • Alcohol use: No     Comment: 0-10 per month   • Drug use: Yes     Types: Inhaled     Comment: marijuana daily     Social History     Substance and Sexual Activity   Drug Use Yes   • Types: Inhaled    Comment: marijuana daily       PAST SURGICAL HISTORY:  Past Surgical History:   Procedure Laterality Date   • INCISION AND DRAINAGE GENERAL Left 6/27/2018    Procedure: INCISION AND DRAINAGE GENERAL/FOOT/POSS WOUND VAC;  Surgeon: Jose Manuel Merlos M.D.;  Location: SURGERY [facility];  Service: Orthopedics   • OTHER ORTHOPEDIC SURGERY  2017    left great toe amputation   • OTHER  2013/2019    cellulitis    • OTHER  ORTHOPEDIC SURGERY      left foot tendon repait       ALLERGIES:   Allergies   Allergen Reactions   • Amlodipine      dizziness   • Clindamycin Diarrhea     .   • Lisinopril      dizziness       MEDICATIONS:  No current facility-administered medications on file prior to encounter.      Current Outpatient Medications on File Prior to Encounter   Medication Sig Dispense Refill   • Alogliptin Benzoate 25 MG Tab Take  by mouth every evening.     • Insulin Detemir (LEVEMIR SC) Inject 40-50 Units under the skin 2 Times a Day.     • gabapentin (NEURONTIN) 300 MG Cap Take 300 mg by mouth 3 times a day.     • ibuprofen (MOTRIN) 600 MG Tab Take 600 mg by mouth every 6 hours as needed.     • omeprazole (PRILOSEC) 40 MG delayed-release capsule Take 40 mg by mouth every evening.     • losartan (COZAAR) 50 MG Tab Take 50 mg by mouth every evening.     • TRIAMCINOLONE ACETONIDE, TOP, 0.05 % Ointment by Apply externally route.     • linagliptin (TRADJENTA) 5 MG Tab tablet Take 1 Tab by mouth every day. (Patient not taking: Reported on 11/27/2020) 30 Tab 1       LABS:  Lab Results   Component Value Date/Time    HEMOGLOBIN 14.2 11/27/2020 1604    HEMATOCRIT 42.0 11/27/2020 1604    WBC 12.4 (H) 11/27/2020 1604     Lab Results   Component Value Date/Time    SODIUM 130 (L) 11/27/2020 1604    POTASSIUM 5.0 11/27/2020 1604    CHLORIDE 98 11/27/2020 1604    CO2 22 11/27/2020 1604    GLUCOSE 183 (H) 11/27/2020 1604    BUN 27 (H) 11/27/2020 1604    CALCIUM 9.5 11/27/2020 1604       SARS-CoV2 result: Negative      PREVIOUS ANESTHETICS: See EMR  __________________________________________    Relevant Problems   CARDIAC   (+) Hypertension      ENDO   (+) Type 2 diabetes mellitus with neurologic complication (HCC)       Physical Exam    Airway   Mallampati: II  TM distance: >3 FB  Neck ROM: full       Cardiovascular - normal exam  Rhythm: regular  Rate: normal  (-) murmur     Dental   Comments: Missing upper         Pulmonary - normal  exam  Breath sounds clear to auscultation     Abdominal   (+) obese     Neurological - normal exam                 Anesthesia Plan    ASA 3   ASA physical status 3 criteria: diabetes - poorly controlled    Plan - general       Airway plan will be ETT        Induction: intravenous    Postoperative Plan: Postoperative administration of opioids is intended.    Pertinent diagnostic labs and testing reviewed    Informed Consent:    Anesthetic plan and risks discussed with patient.    Use of blood products discussed with: patient whom consented to blood products.          done

## 2025-06-23 NOTE — CONSULTS
"[facility]   TRIAGE ASSESSMENT    Name: Kaylee Ivy  MRN: [id number]  : 1970  Age: 47 y.o.  Date of assessment: 2017  PCP: Rahul Pt States None  Persons in attendance: Patient    CHIEF COMPLAINT/PRESENTING ISSUE (as stated by Patient):  Patient seen lying completely flat on hospital bed, alert and oriented. Patient resistant to interview and declined to respond to questions. Patient began to talk about a surgery, then stated that her cat , and other nonsensical comments before stating that she cannot talk anymore. On the second attempt to interview patient, she remained resistant but reported that she hears \"random voices, like someone standing behind her whispering\". Patient was observed staring in space as if to be preoccupied with internal stimuli. Patient denies command voices, and denies visual hallucinations although previously told RN she saw shapes in corners. Patient denies suicidal or homicidal ideations. Patient ended the interview abruptly and asked to be left alone and the curtains closed.  Chief Complaint   Patient presents with   • Hallucinations     auditory/visual        CURRENT LIVING SITUATION/SOCIAL SUPPORT: Patient reports that she resides with a friend. She has no children and has never . Patient reports that her mother is still living but she cannot reside with her because of the mother's boyfriend. Patient states that she is unemployed and if her friend decides she cannot stay there anymore she will be homeless.    BEHAVIORAL HEALTH TREATMENT HISTORY  Does patient/parent report a history of prior behavioral health treatment for patient?   patient declined to answer this question    SAFETY ASSESSMENT - SELF  Does patient acknowledge current or past symptoms of dangerousness to self? no  Does parent/significant other report patient has current or past symptoms of dangerousness to self? no  Does presenting problem suggest symptoms of dangerousness to self? " "No    SAFETY ASSESSMENT - OTHERS  Does patient acknowledge current or past symptoms of aggressive behavior or risk to others? no  Does parent/significant other report patient has current or past symptoms of aggressive behavior or risk to others?  no  Does presenting problem suggest symptoms of dangerousness to others? No    Crisis Safety Plan completed and copy given to patient? no    ABUSE/NEGLECT SCREENING  Does patient report feeling “unsafe” in his/her home, or afraid of anyone?  no  Does patient report any history of physical, sexual, or emotional abuse?  no  Does parent or significant other report any of the above? no  Is there evidence of neglect by self?  no  Is there evidence of neglect by a caregiver? no  Does the patient/parent report any history of CPS/APS/police involvement related to suspected abuse/neglect or domestic violence? no  Based on the information provided during the current assessment, is a mandated report of suspected abuse/neglect being made?  No    SUBSTANCE USE SCREENING  Yes:  Bobby all substances used in the past 30 days:      Last Use Amount   []   Alcohol     [x]   Marijuana     []   Heroin     []   Prescription Opioids  (used without prescription, for    recreation, or in excess of prescribed amount)     []   Other Prescription  (used without prescription, for    recreation, or in excess of prescribed amount)     []   Cocaine      [x]   Methamphetamine     []   \"\" drugs (ectasy, MDMA)     []   Other substances        UDS results: Methadone, Marijuana  Breathalyzer results: 0.01    What consequences does the patient associate with any of the above substance use and or addictive behaviors? None    Risk factors for detox (check all that apply):  []  Seizures   []  Diaphoretic (sweating)   []  Tremors   []  Hallucinations   []  Increased blood pressure   []  Decreased blood pressure   []  Other   []  None      [] Patient education on risk factors for detoxification and instructed " to return to ER as needed.        MENTAL STATUS   Participation: Resistant  Grooming: Disheveled  Orientation: Alert  Behavior: Tense  Eye contact: Poor  Mood: Irritable  Affect: Labile  Thought process: Loose associations  Thought content: disorganized  Speech: Soft  Perception: Evidence of auditory hallucination  Memory:  Recent:  Limited  Insight: Limited  Judgment:  Limited  Other:    Collateral information:   Source:  [] Significant other present in person:   [] Significant other by telephone  [] [facility]   [x] [facility] Nursing Staff  [x] [facility]  [] Other:     [] Unable to complete full assessment due to:  [] Acute intoxication  [] Patient declined to participate/engage  [] Patient verbally unresponsive  [] Significant cognitive deficits  [] Significant perceptual distortions or behavioral disorganization  [] Other:      CLINICAL IMPRESSIONS:  Primary:  Substance abuse-Meth and marijuana; Evidence of a thought disorder NOS  Secondary:  deferred       IDENTIFIED NEEDS/PLAN:  [Trigger DISPOSITION list for any items marked]    []  Imminent safety risk - self [] Imminent safety risk - others   []  Acute substance withdrawal []  Psychosis/Impaired reality testing   []  Mood/anxiety []  Substance use/Addictive behavior   []  Maladaptive behaviro []  Parent/child conflict   []  Family/Couples conflict []  Biomedical   []  Housing []  Financial   []   Legal  Occupational/Educational   []  Domestic violence []  Other:     Disposition: Patient discharged to home, she declined any referrals or treatment recommendations    Does patient express agreement with the above plan? yes    Referral appointment(s) scheduled? no    Alert team only:   I have discussed findings and recommendations with Dr. Jason who is in agreement with these recommendations.     Referral information sent to the following community providers :    If applicable : Referred  to : for legal hold follow up at (time): No  hold at this time      Suzanne Merritt, Ph.D.  8/29/2017                 [Annual Wellness Visit] : an annual wellness visit

## (undated) DEVICE — DRESSING 3X3 ADAPTIC GAUZE - (50EA/CT)

## (undated) DEVICE — KIT ANESTHESIA W/CIRCUIT & 3/LT BAG W/FILTER (20EA/CA)

## (undated) DEVICE — SET EXTENSION WITH 2 PORTS (48EA/CA) ***PART #2C8610 IS A SUBSTITUTE*****

## (undated) DEVICE — Device

## (undated) DEVICE — GLOVE SZ 6.5 BIOGEL PI MICRO - PF LF (50PR/BX)

## (undated) DEVICE — HANDPIECE 10FT INTPLS SCT PLS IRRIGATION HAND CONTROL SET (6/PK)

## (undated) DEVICE — HEAD HOLDER JUNIOR/ADULT

## (undated) DEVICE — NEEDLE NON SAFETY HYPO 22 GA X 1 1/2 IN (100/BX)

## (undated) DEVICE — GLOVE BIOGEL PI INDICATOR SZ 7.0 SURGICAL PF LF - (50/BX 4BX/CA)

## (undated) DEVICE — SLEEVE, VASO, THIGH, MED

## (undated) DEVICE — GLOVE BIOGEL SZ 6.5 SURGICAL PF LTX (50PR/BX 4BX/CA)

## (undated) DEVICE — BLADE SURGICAL #15 - (50/BX 3BX/CA)

## (undated) DEVICE — GVL 3 STAT DISPOSABLE - (10/BX)

## (undated) DEVICE — VAC CANISTER W/GEL 500ML - FITS NEW MACHINES (10EA/CA)

## (undated) DEVICE — GLOVE BIOGEL SZ 8 SURGICAL PF LTX - (50PR/BX 4BX/CA)

## (undated) DEVICE — PADDING CAST 6 IN STERILE - 6 X 4 YDS (24/CA)

## (undated) DEVICE — SENSOR SPO2 NEO LNCS ADHESIVE (20/BX) SEE USER NOTES

## (undated) DEVICE — SUTURE 3-0 MONOCRYL SH (36PK/BX)

## (undated) DEVICE — BANDAGE ELASTIC 6 HONEYCOMB - 6X5YD LF (20/CA)"

## (undated) DEVICE — PACK LOWER EXTREMITY - (2/CA)

## (undated) DEVICE — LACTATED RINGERS INJ 1000 ML - (14EA/CA 60CA/PF)

## (undated) DEVICE — SODIUM CHL IRRIGATION 0.9% 1000ML (12EA/CA)

## (undated) DEVICE — TUBE E-T HI-LO CUFF 7.5MM (10EA/PK)

## (undated) DEVICE — BLADE OSCILLATING 9MMX24.6MMX0.4MM LIKE STRYKER 2296-3-111

## (undated) DEVICE — SODIUM CHL. IRRIGATION 0.9% 3000ML (4EA/CA 65CA/PF)

## (undated) DEVICE — CONTAINER SPECIMEN BAG OR - STERILE 4 OZ W/LID (100EA/CA)

## (undated) DEVICE — TRAY SRGPRP PVP IOD WT PRP - (20/CA)

## (undated) DEVICE — KIT ROOM DECONTAMINATION

## (undated) DEVICE — MASK ANESTHESIA ADULT  - (100/CA)

## (undated) DEVICE — SUTURE GENERAL

## (undated) DEVICE — PROTECTOR ULNA NERVE - (36PR/CA)

## (undated) DEVICE — BLADE SURGICAL #11 - (50/BX)

## (undated) DEVICE — ELECTRODE 850 FOAM ADHESIVE - HYDROGEL RADIOTRNSPRNT (50/PK)

## (undated) DEVICE — SYRINGE 10 ML CONTROL LL (25EA/BX 4BX/CA)

## (undated) DEVICE — DRESSING KIT V.A.C. SENSA T.R.A.C. SMALL (10EA/CA)

## (undated) DEVICE — SUTURE 3-0 ETHILON PS-1 (36PK/BX)

## (undated) DEVICE — TUBING CLEARLINK DUO-VENT - C-FLO (48EA/CA)

## (undated) DEVICE — NEPTUNE 4 PORT MANIFOLD - (20/PK)

## (undated) DEVICE — SUCTION INSTRUMENT YANKAUER BULBOUS TIP W/O VENT (50EA/CA)

## (undated) DEVICE — GLOVE BIOGEL PI INDICATOR SZ 8.0 SURGICAL PF LF -(50/BX 4BX/CA)

## (undated) DEVICE — TIP INTPLS HFLO ML ORFC BTRY - (12/CS)  FOR SURGILAV

## (undated) DEVICE — SUTURE 2-0 ETHILON FS - (36/BX) 18 INCH

## (undated) DEVICE — GLOVE, LITE (PAIR)

## (undated) DEVICE — GOWN SURGEONS X-LARGE - DISP. (30/CA)

## (undated) DEVICE — CANISTER SUCTION 3000ML MECHANICAL FILTER AUTO SHUTOFF MEDI-VAC NONSTERILE LF DISP  (40EA/CA)

## (undated) DEVICE — CHLORAPREP 26 ML APPLICATOR - ORANGE TINT(25/CA)

## (undated) DEVICE — SUTURE 3-0 VICRYL PLUS SH - 27 INCH (36/BX)

## (undated) DEVICE — GLOVE BIOGEL INDICATOR SZ 8.5 SURGICAL PF LTX - (50/BX 4BX/CA)

## (undated) DEVICE — GLOVE BIOGEL PI INDICATOR SZ 6.5 SURGICAL PF LF - (50/BX 4BX/CA)

## (undated) DEVICE — STAPLER SKIN DISP - (6/BX 10BX/CA) VISISTAT

## (undated) DEVICE — PAD LAP STERILE 18 X 18 - (5/PK 40PK/CA)

## (undated) DEVICE — SWAB CULTURE AMIES ESWAB (50EA/PK)

## (undated) DEVICE — DRESSING 3X8 ADAPTIC GAUZE - NON-ADHERING (36/PK 6PK/BX)

## (undated) DEVICE — TUBE E-T HI-LO CUFF 7.0MM (10EA/PK)

## (undated) DEVICE — TUBE, CULTURE AEROBIC

## (undated) DEVICE — SWAB ANAEROBIC SPEC.COLLECTOR - (25/PK 4PK/CA 100EA/CA)

## (undated) DEVICE — SET LEADWIRE 5 LEAD BEDSIDE DISPOSABLE ECG (1SET OF 5/EA)

## (undated) DEVICE — GOWN WARMING STANDARD FLEX - (30/CA)

## (undated) DEVICE — GLOVE BIOGEL ECLIPSE PF LATEX SIZE 8.0  (50PR/BX)

## (undated) DEVICE — WRAP CO-FLEX 4IN X 5YD STERIL - SELF-ADHERENT (18/CA)

## (undated) DEVICE — ELECTRODE DUAL RETURN W/ CORD - (50/PK)